# Patient Record
Sex: FEMALE | Race: AMERICAN INDIAN OR ALASKA NATIVE | NOT HISPANIC OR LATINO | Employment: UNEMPLOYED | URBAN - METROPOLITAN AREA
[De-identification: names, ages, dates, MRNs, and addresses within clinical notes are randomized per-mention and may not be internally consistent; named-entity substitution may affect disease eponyms.]

---

## 2017-09-11 ENCOUNTER — COMMUNICATION - HEALTHEAST (OUTPATIENT)
Dept: SCHEDULING | Facility: CLINIC | Age: 27
End: 2017-09-11

## 2018-02-16 ENCOUNTER — APPOINTMENT (OUTPATIENT)
Dept: FAMILY MEDICINE | Facility: CLINIC | Age: 28
End: 2018-02-16

## 2018-02-16 PROCEDURE — 99499 UNLISTED E&M SERVICE: CPT | Performed by: FAMILY MEDICINE

## 2018-03-13 ENCOUNTER — TRANSFERRED RECORDS (OUTPATIENT)
Dept: HEALTH INFORMATION MANAGEMENT | Facility: CLINIC | Age: 28
End: 2018-03-13

## 2018-03-16 ENCOUNTER — TRANSFERRED RECORDS (OUTPATIENT)
Dept: HEALTH INFORMATION MANAGEMENT | Facility: CLINIC | Age: 28
End: 2018-03-16

## 2018-03-16 ENCOUNTER — TELEPHONE (OUTPATIENT)
Dept: FAMILY MEDICINE | Facility: CLINIC | Age: 28
End: 2018-03-16

## 2018-03-16 DIAGNOSIS — O09.31: Primary | ICD-10-CM

## 2018-03-16 LAB
C TRACH DNA SPEC QL PROBE+SIG AMP: NOT DETECTED
N GONORRHOEA DNA SPEC QL PROBE+SIG AMP: NOT DETECTED
SPECIMEN DESCRIP: NORMAL
SPECIMEN DESCRIPTION: NORMAL

## 2018-03-16 NOTE — TELEPHONE ENCOUNTER
Reason for Call:  Other   Referral    Detailed comments: [facility]    Phone Number Patient can be reached at: Other phone number:  [phone] call when referral placed    Best Time: anytime     Can we leave a detailed message on this number? Not Applicable    Call taken on 3/16/2018 at 11:48 AM by Olga Royal

## 2018-04-10 ENCOUNTER — TRANSFERRED RECORDS (OUTPATIENT)
Dept: HEALTH INFORMATION MANAGEMENT | Facility: CLINIC | Age: 28
End: 2018-04-10

## 2018-04-11 ENCOUNTER — TRANSFERRED RECORDS (OUTPATIENT)
Dept: HEALTH INFORMATION MANAGEMENT | Facility: CLINIC | Age: 28
End: 2018-04-11

## 2018-04-13 ENCOUNTER — TRANSFERRED RECORDS (OUTPATIENT)
Dept: HEALTH INFORMATION MANAGEMENT | Facility: CLINIC | Age: 28
End: 2018-04-13

## 2018-04-23 ENCOUNTER — TELEPHONE (OUTPATIENT)
Dept: MATERNAL FETAL MEDICINE | Facility: CLINIC | Age: 28
End: 2018-04-23

## 2018-04-23 NOTE — TELEPHONE ENCOUNTER
Phone call this am to pt re consult request. Talked to pt mother who states pt is not available but would be home this afternoon. Attempted to call pt back now and no answer. This scribe would like to talk to pt re scheduling an appointment at [facility] as her provider would like to transfer her care. Per Dr. Allison, pt should be referred to [facility] at this time for OB care. Will attempt to call pt tomorrow. Nadya Dang RN

## 2018-04-24 ENCOUNTER — TELEPHONE (OUTPATIENT)
Dept: MATERNAL FETAL MEDICINE | Facility: CLINIC | Age: 28
End: 2018-04-24

## 2018-04-24 NOTE — TELEPHONE ENCOUNTER
Phone call to [facility]'s [facility] re pt consult. Talked to Sybil CASTREJON. She will talk to provider who referred pt and relay information that MFM unable to schedule pt for consult and L2 as pt not able to come on days offered due to transportation. Aware PCC has not been able to get a hold of pt since to refer her to [facility] for OB care as Dr. Allison has recommended. No appointments scheduled here at this time. Sybil given number for [facility] to refer her for ob care. Number for PCC line if further questions arise Nadya Dang RN

## 2018-05-14 ENCOUNTER — TELEPHONE (OUTPATIENT)
Dept: MATERNAL FETAL MEDICINE | Facility: CLINIC | Age: 28
End: 2018-05-14

## 2018-05-14 ENCOUNTER — HOSPITAL ENCOUNTER (OUTPATIENT)
Facility: CLINIC | Age: 28
Discharge: HOME | End: 2018-05-14
Attending: OBSTETRICS & GYNECOLOGY | Admitting: OBSTETRICS & GYNECOLOGY
Payer: COMMERCIAL

## 2018-05-14 VITALS — TEMPERATURE: 98 F | DIASTOLIC BLOOD PRESSURE: 60 MMHG | SYSTOLIC BLOOD PRESSURE: 114 MMHG | WEIGHT: 278 LBS

## 2018-05-14 PROBLEM — Z36.89 ENCOUNTER FOR TRIAGE IN PREGNANT PATIENT: Status: ACTIVE | Noted: 2018-05-14

## 2018-05-14 LAB
ALBUMIN UR-MCNC: 10 MG/DL
ALT SERPL W P-5'-P-CCNC: 8 U/L (ref 0–50)
AMORPH CRY #/AREA URNS HPF: ABNORMAL /HPF
AMPHETAMINES UR QL SCN: NEGATIVE
APPEARANCE UR: ABNORMAL
AST SERPL W P-5'-P-CCNC: 10 U/L (ref 0–45)
BILIRUB UR QL STRIP: NEGATIVE
CANNABINOIDS UR QL: NEGATIVE
COCAINE UR QL: NEGATIVE
COLOR UR AUTO: YELLOW
CREAT SERPL-MCNC: 0.54 MG/DL (ref 0.52–1.04)
CREAT UR-MCNC: 137 MG/DL
ERYTHROCYTE [DISTWIDTH] IN BLOOD BY AUTOMATED COUNT: 13.1 % (ref 10–15)
GFR SERPL CREATININE-BSD FRML MDRD: >90 ML/MIN/1.7M2
GLUCOSE UR STRIP-MCNC: NEGATIVE MG/DL
HCT VFR BLD AUTO: 32.2 % (ref 35–47)
HGB BLD-MCNC: 11 G/DL (ref 11.7–15.7)
HGB UR QL STRIP: NEGATIVE
KETONES UR STRIP-MCNC: NEGATIVE MG/DL
LEUKOCYTE ESTERASE UR QL STRIP: NEGATIVE
MCH RBC QN AUTO: 30.2 PG (ref 26.5–33)
MCHC RBC AUTO-ENTMCNC: 34.2 G/DL (ref 31.5–36.5)
MCV RBC AUTO: 89 FL (ref 78–100)
MUCOUS THREADS #/AREA URNS LPF: PRESENT /LPF
NITRATE UR QL: NEGATIVE
OPIATES UR QL SCN: NEGATIVE
PCP UR QL SCN: NEGATIVE
PH UR STRIP: 7 PH (ref 5–7)
PLATELET # BLD AUTO: 406 10E9/L (ref 150–450)
PROT UR-MCNC: 0.11 G/L
PROT/CREAT 24H UR: 0.08 G/G CR (ref 0–0.2)
RBC # BLD AUTO: 3.64 10E12/L (ref 3.8–5.2)
RBC #/AREA URNS AUTO: 1 /HPF (ref 0–2)
SOURCE: ABNORMAL
SP GR UR STRIP: 1.02 (ref 1–1.03)
SQUAMOUS #/AREA URNS AUTO: 3 /HPF (ref 0–1)
UROBILINOGEN UR STRIP-MCNC: NORMAL MG/DL (ref 0–2)
WBC # BLD AUTO: 9.9 10E9/L (ref 4–11)
WBC #/AREA URNS AUTO: 1 /HPF (ref 0–5)

## 2018-05-14 PROCEDURE — G0463 HOSPITAL OUTPT CLINIC VISIT: HCPCS

## 2018-05-14 PROCEDURE — 36415 COLL VENOUS BLD VENIPUNCTURE: CPT | Performed by: OBSTETRICS & GYNECOLOGY

## 2018-05-14 PROCEDURE — 84460 ALANINE AMINO (ALT) (SGPT): CPT | Performed by: OBSTETRICS & GYNECOLOGY

## 2018-05-14 PROCEDURE — 85027 COMPLETE CBC AUTOMATED: CPT | Performed by: OBSTETRICS & GYNECOLOGY

## 2018-05-14 PROCEDURE — 82565 ASSAY OF CREATININE: CPT | Performed by: OBSTETRICS & GYNECOLOGY

## 2018-05-14 PROCEDURE — 84450 TRANSFERASE (AST) (SGOT): CPT | Performed by: OBSTETRICS & GYNECOLOGY

## 2018-05-14 PROCEDURE — 81001 URINALYSIS AUTO W/SCOPE: CPT | Performed by: OBSTETRICS & GYNECOLOGY

## 2018-05-14 PROCEDURE — 80307 DRUG TEST PRSMV CHEM ANLYZR: CPT | Performed by: OBSTETRICS & GYNECOLOGY

## 2018-05-14 PROCEDURE — 84156 ASSAY OF PROTEIN URINE: CPT | Performed by: OBSTETRICS & GYNECOLOGY

## 2018-05-14 RX ORDER — ALBUTEROL SULFATE 90 UG/1
2 AEROSOL, METERED RESPIRATORY (INHALATION) PRN
COMMUNITY
Start: 2016-01-25 | End: 2022-08-30

## 2018-05-14 NOTE — PROGRESS NOTES
OB Triage Note    S: Ruth Peña is a 28 year old  at 23w2d by 11w0d ultrasound who presents due to lower extremity edema. She reports significant swelling in both legs, which has developed over the course of the past week. She called her clinic in [place] [facility] the [facility], where she lives, and reports she was told to come to [facility] as she is too high risk to be seen there. She denies headache, vision changes, chest pain or SOB. Denies contractions, bleeding or LOF and is feeling fetal movement.     Her pregnancy is complicated by:  - Methamphetamine abuse, in treatment early in pregnancy until 3/28/18.   - Very limited prenatal care; one visit at 17 weeks. This was at [facility]. She was instructed to follow up at [facility] in [place] as [facility] cannot accommodate her BMI in the OR for .  - History of two previous cs  - Tobacco abuse  - Obesity    O:    Vitals:    18 1526 18 1530 18 1545 18 1600   BP:  137/72 139/72 114/60   Temp: 98  F (36.7  C)      TempSrc: Oral      Weight: 126.1 kg (278 lb)        Gen: alert, oriented, NAD  Abd: soft, gravid, obese, non-tender  Ext: pitting edema to ankles bilaterally; 1+ DTRs in LEs without clonus    Labs:  Creat 0.54  Hgb 11  Plts 406  AST 10  ALT 8  UP:C 0.08    A/P:  28 year old  at 23w2d presenting for LE edema    1. Elevated BPs:  - Initially found to have mild range BPs on admission, followed by normal BPs. Patient does not yet meet criteria for gestational HTN as mild BPs not >4 hours apart. She denies history of elevated BPs outside of pregnancy, however has few BPs documented in the system and given her medical comordbity of obesity she may also be an undiagnosed chronic hypertensive. HELLP labs and urine protein:creatinine normal as above and no symptoms of preeclampsia.  - Plan outpatient close follow up. Explained GHTN/preeclampsia to patient and thoroughly reviewed warning signs.     2. Methamphetamine abuse, s/p  treatment  - Negative Utox today    3. Restricted to [facility] or [facility]  - Discussed with patient recommendation for follow up in the [facility] or [place] system. She states that she has changed her restriction to [place] and is adamant that she can receive care here. She does not want to go to [facility] and states she cannot get to [facility] because she does not have a ride, though of note she reports currently living in [place].  - Will have financial call her tomorrow to discuss this further. Tentatively, will plan for her to be seen in [facility] clinic on Wed 5/16 for BP check and to establish prenatal care. She refused to take with her the printed numbers to establish care at  or [facility].     4. LE edema  - Offered compression stockings- declined by patient  - Recommended elevation while at rest    Muriel Beltrán MD

## 2018-05-14 NOTE — IP AVS SNAPSHOT
[facility]    [address]    [facility] [address]    Phone:  [phone]                                       After Visit Summary   5/14/2018    Ruth Peña    MRN: [id number]           After Visit Summary Signature Page     I have received my discharge instructions, and my questions have been answered. I have discussed any challenges I see with this plan with the nurse or doctor.    ..........................................................................................................................................  Patient/Patient Representative Signature      ..........................................................................................................................................  Patient Representative Print Name and Relationship to Patient    ..................................................               ................................................  Date                                            Time    ..........................................................................................................................................  Reviewed by Signature/Title    ...................................................              ..............................................  Date                                                            Time

## 2018-05-14 NOTE — PLAN OF CARE
Problem: Patient Care Overview  Goal: Plan of Care/Patient Progress Review  Outcome: Adequate for Discharge Date Met: 05/14/18  Discharge home undelivered. Discharge instructions reviewed per Dr. Cummings and patient verbalized understanding. [facility] clinic will call patient tomorrow and set up an appointment for Wednesday afternoon. Patient in agreement.

## 2018-05-14 NOTE — IP AVS SNAPSHOT
MRN:[id number]                      After Visit Summary   5/14/2018    Ruth Peña    MRN: [id number]           Thank you!     Thank you for choosing [facility] for your care. Our goal is always to provide you with excellent care. Hearing back from our patients is one way we can continue to improve our services. Please take a few minutes to complete the written survey that you may receive in the mail after you visit with us. Thank you!        Patient Information     Date Of Birth          1990        Designated Caregiver       Most Recent Value    Caregiver    Will someone help with your care after discharge? no      About your hospital stay     You were admitted on:  May 14, 2018 You last received care in the:  [facility]    You were discharged on:  May 14, 2018       Who to Call     For medical emergencies, please call 911.  For non-urgent questions about your medical care, please call your primary care provider or clinic, [phone]          Attending Provider     Provider Specialty    Mruiel Beltrán MD OB/Gyn       Primary Care Provider Office Phone # Fax #    [facility] [phone]       When to contact your care team       Call [phone] if you experience:  - headache not relived by pain medications  - spots or flashes of light in your vision  - severe upper abdominal pain  - chest pain  - shortness of breath                  Further instructions from your care team       Discharge Instruction for Undelivered Patients      You were seen for: Headache and swelling  We Consulted: Dr. Beltrán  You had (Test or Medicine):Fetal monitoring, Lab work WNL     Diet:   Drink 8 to 12 glasses of liquids (milk, juice, water) every day.  You may eat meals and snacks.     Activity:  Count fetal kicks everyday (see handout)  Call your doctor or nurse midwife if your baby is moving less than usual.     Call your provider if you notice:  Swelling in your face or  "increased swelling in your hands or legs.  Headaches that are not relieved by Tylenol (acetaminophen).  Changes in your vision (blurring: seeing spots or stars.)  Nausea (sick to your stomach) and vomiting (throwing up).   Weight gain of 5 pounds or more per week.  Heartburn that doesn't go away.  Signs of bladder infection: pain when you urinate (use the toilet), need to go more often and more urgently.  The bag of weiss (rupture of membranes) breaks, or you notice leaking in your underwear.  Bright red blood in your underwear.  Abdominal (lower belly) or stomach pain.  For first baby: Contractions (tightening) less than 5 minutes apart for one hour or more.  Second (plus) baby: Contractions (tightening) less than 10 minutes apart and getting stronger.  *If less than 34 weeks: Contractions (tightenings) more than 6 times in one hour.  Increase or change in vaginal discharge (note the color and amount)    Follow-up:  Make an appointment to be seen on This week          Pending Results     No orders found from 5/12/2018 to 5/15/2018.            Statement of Approval     Ordered          05/14/18 1711  I have reviewed and agree with all the recommendations and orders detailed in this document.  EFFECTIVE NOW     Approved and electronically signed by:  Muriel Beltrán MD             Admission Information     Date & Time Provider Department Dept. Phone    5/14/2018 Muriel Beltrán MD [facility] [phone]      Your Vitals Were     Blood Pressure Temperature Weight             114/60 98  F (36.7  C) (Oral) 126.1 kg (278 lb)         MyChart Information     [facility] lets you send messages to your doctor, view your test results, renew your prescriptions, schedule appointments and more. To sign up, go to [email or web address] . Click on \"Log in\" on the left side of the screen, which will take you to the Welcome page. Then click on \"Sign up Now\" on the right side of the page.     You will be asked to enter " the access code listed below, as well as some personal information. Please follow the directions to create your username and password.     Your access code is: [email or web address]  Expires: 2018  5:13 PM     Your access code will  in 90 days. If you need help or a new code, please call your [facility] clinic or [phone].        Care EveryWhere ID     This is your Care EveryWhere ID. This could be used by other organizations to access your [facility] medical records  [id number]        Equal Access to Services     [facility]: Hadalba vazquez Sonickie, waayesha luqadaha, qaybtrev kaalmaluther gray, jurgen rudd . So [facility] [phone].    ATENCIÓN: Si habla español, tiene a parr disposición servicios gratuitos de asistencia lingüística. Llame al [phone].    We comply with applicable federal civil rights laws and [place] laws. We do not discriminate on the basis of race, color, national origin, age, disability, sex, sexual orientation, or gender identity.               Review of your medicines      UNREVIEWED medicines. Ask your doctor about these medicines        Dose / Directions    albuterol 108 (90 Base) MCG/ACT Inhaler   Commonly known as:  PROAIR HFA/PROVENTIL HFA/VENTOLIN HFA        Dose:  2 puff   Inhale 2 puffs into the lungs as needed   Refills:  0       PRENATAL VITAMIN PO        Dose:  1 tablet   Take 1 tablet by mouth   Refills:  0                Protect others around you: Learn how to safely use, store and throw away your medicines at www.disposemymeds.org.             Medication List: This is a list of all your medications and when to take them. Check marks below indicate your daily home schedule. Keep this list as a reference.      Medications           Morning Afternoon Evening Bedtime As Needed    albuterol 108 (90 Base) MCG/ACT Inhaler   Commonly known as:  PROAIR HFA/PROVENTIL HFA/VENTOLIN HFA   Inhale 2 puffs into the lungs as needed                                 PRENATAL VITAMIN PO   Take 1 tablet by mouth

## 2018-05-14 NOTE — DISCHARGE INSTRUCTIONS
Discharge Instruction for Undelivered Patients      You were seen for: Headache and swelling  We Consulted: Dr. Beltrán  You had (Test or Medicine):Fetal monitoring, Lab work WNL     Diet:   Drink 8 to 12 glasses of liquids (milk, juice, water) every day.  You may eat meals and snacks.     Activity:  Count fetal kicks everyday (see handout)  Call your doctor or nurse midwife if your baby is moving less than usual.     Call your provider if you notice:  Swelling in your face or increased swelling in your hands or legs.  Headaches that are not relieved by Tylenol (acetaminophen).  Changes in your vision (blurring: seeing spots or stars.)  Nausea (sick to your stomach) and vomiting (throwing up).   Weight gain of 5 pounds or more per week.  Heartburn that doesn't go away.  Signs of bladder infection: pain when you urinate (use the toilet), need to go more often and more urgently.  The bag of weiss (rupture of membranes) breaks, or you notice leaking in your underwear.  Bright red blood in your underwear.  Abdominal (lower belly) or stomach pain.  For first baby: Contractions (tightening) less than 5 minutes apart for one hour or more.  Second (plus) baby: Contractions (tightening) less than 10 minutes apart and getting stronger.  *If less than 34 weeks: Contractions (tightenings) more than 6 times in one hour.  Increase or change in vaginal discharge (note the color and amount)    Follow-up:  Make an appointment to be seen on This week

## 2018-05-14 NOTE — PLAN OF CARE
23+4 weeks here with c/o head ache and lower extremity swelling.  Difficulty obtaining fetal heart tones due to pt size.  Dop tones 140, toco applied, no contractions noted.  Pt does have 4+ pitting edema in lower extremities.  Admission data base remarkable for meth use early in pregnancy, pt smokes 1-2 cigarettes per day, does have significant mental health hx that she feels is well controlled at present time but does not receive any treatment for it.  Seriel blood pressures started and have been upper end of normal limits.  Pt c/o mild head ache; ice pack given.  Dr Perales notified of patients arrival, labs ordered. Report to LANDON Sauceda RN will anticipate Dr Perales's assessment and plan

## 2018-05-15 ENCOUNTER — TELEPHONE (OUTPATIENT)
Dept: OBGYN | Facility: CLINIC | Age: 28
End: 2018-05-15

## 2018-05-15 NOTE — TELEPHONE ENCOUNTER
"Call back from [place]-- Pt has MA restricted to her primary clinic ([facility]- [facility], [place]). In order to be covered here this provider will need to send a referral to the \"restricted MA program\" noting she is referred to be seen here for prenatal care for course of pregnancy, delivery, and post partum care.    Attempted to reach patient but reached voicemail for a \"Soni\" on provided phone number. Left message requesting pt call us back.  "
Dr. Beltrán brought this patient to RN attention to check on insurance coverage for office visits. Call out to [facility] in [facility] financial counseling. If covered, pt should be scheduled for offive visit this week (Wednesday afternoon, if possible, on DOD schedule if necessary).  
no

## 2018-06-07 ENCOUNTER — APPOINTMENT (OUTPATIENT)
Dept: OBGYN | Facility: CLINIC | Age: 28
End: 2018-06-07
Payer: COMMERCIAL

## 2018-06-07 ENCOUNTER — PRENATAL OFFICE VISIT (OUTPATIENT)
Dept: OBGYN | Facility: CLINIC | Age: 28
End: 2018-06-07

## 2018-06-07 DIAGNOSIS — Z34.80 PRENATAL CARE, SUBSEQUENT PREGNANCY: Primary | ICD-10-CM

## 2018-06-07 PROCEDURE — 99207 ZZC NO CHARGE NURSE ONLY: CPT | Performed by: ADVANCED PRACTICE MIDWIFE

## 2018-06-07 NOTE — PROGRESS NOTES
Patient is transfer [facility] in [place] She has asked that her records be sent to [facility]  Sue Denis OB Intake Nurse

## 2018-06-07 NOTE — MR AVS SNAPSHOT
"              After Visit Summary   6/7/2018    Ruth Peña    MRN: [id number]           Patient Information     Date Of Birth          1990        Visit Information        Provider Department      6/7/2018 3:28 PM Danya Brandt APRN CNM [facility]        Today's Diagnoses     Prenatal care, subsequent pregnancy    -  1       Follow-ups after your visit        Your next 10 appointments already scheduled     Jun 14, 2018  2:00 PM CDT   New Prenatal with FAITH Ellis CNM, [facility]   [facility]    [address] [place] [address]   [phone]              Who to contact     If you have questions or need follow up information about today's clinic visit or your schedule please contact [facility] directly at [phone].  Normal or non-critical lab and imaging results will be communicated to you by MyChart, letter or phone within 4 business days after the clinic has received the results. If you do not hear from us within 7 days, please contact the clinic through MyChart or phone. If you have a critical or abnormal lab result, we will notify you by phone as soon as possible.  Submit refill requests through [employer] or call your pharmacy and they will forward the refill request to us. Please allow 3 business days for your refill to be completed.          Additional Information About Your Visit        MyChart Information     [employer] lets you send messages to your doctor, view your test results, renew your prescriptions, schedule appointments and more. To sign up, go to [email or web address] . Click on \"Log in\" on the left side of the screen, which will take you to the Welcome page. Then click on \"Sign up Now\" on the right side of the page.     You will be asked to enter the access code listed below, as well as some personal information. Please follow the directions to create your username and password.   "   Your access code is: [email or web address]  Expires: 2018  5:13 PM     Your access code will  in 90 days. If you need help or a new code, please call your [facility] clinic or [phone].        Care EveryWhere ID     This is your Care EveryWhere ID. This could be used by other organizations to access your [facility] medical records  [id number]         Blood Pressure from Last 3 Encounters:   18 114/60    Weight from Last 3 Encounters:   18 126.1 kg (278 lb)              Today, you had the following     No orders found for display       Primary Care Provider Office Phone # Fax #    [facility] [phone]       [address] [place], [place] [address]        Equal Access to Services     [facility] : Akanksha vazquez Sonickie, waaxda luqadaha, qaybta kaalmada adeegyada, jurgen rudd . So [facility] [phone].    ATENCIÓN: Si habla español, tiene a parr disposición servicios gratuitos de asistencia lingüística. Llame al [phone].    We comply with applicable federal civil rights laws and [place] laws. We do not discriminate on the basis of race, color, national origin, age, disability, sex, sexual orientation, or gender identity.            Thank you!     Thank you for choosing [facility]  for your care. Our goal is always to provide you with excellent care. Hearing back from our patients is one way we can continue to improve our services. Please take a few minutes to complete the written survey that you may receive in the mail after your visit with us. Thank you!             Your Updated Medication List - Protect others around you: Learn how to safely use, store and throw away your medicines at www.disposemymeds.org.          This list is accurate as of 18  3:57 PM.  Always use your most recent med list.                   Brand Name Dispense Instructions for use Diagnosis    albuterol 108 (90 Base) MCG/ACT Inhaler    PROAIR  HFA/PROVENTIL HFA/VENTOLIN HFA     Inhale 2 puffs into the lungs as needed        PRENATAL VITAMIN PO      Take 1 tablet by mouth

## 2018-06-13 ENCOUNTER — TELEPHONE (OUTPATIENT)
Dept: OBGYN | Facility: CLINIC | Age: 28
End: 2018-06-13

## 2018-06-13 DIAGNOSIS — Z34.82 PRENATAL CARE, SUBSEQUENT PREGNANCY IN SECOND TRIMESTER: Primary | ICD-10-CM

## 2020-06-22 ENCOUNTER — TELEPHONE (OUTPATIENT)
Dept: SURGERY | Facility: CLINIC | Age: 30
End: 2020-06-22

## 2020-06-22 NOTE — TELEPHONE ENCOUNTER
Patient interested in weight loss surgery    Ruth Peña        Scheduled to see Laura Cobb PA-C at 1330, followed by an appt with a dietitian at 1430.    Spoke to Patient:    Left message: regarding appt Tues June 23rd.    Can find information on bariatrix products at: [email or web address]    Instructed to view seminar and complete pre-visit questionnaire prior to visit at [email or web address].    [phone] contact center phone number      Skylar Cheng EMT

## 2020-06-23 ENCOUNTER — VIRTUAL VISIT (OUTPATIENT)
Dept: SURGERY | Facility: CLINIC | Age: 30
End: 2020-06-23
Payer: COMMERCIAL

## 2020-06-23 ENCOUNTER — E-VISIT (OUTPATIENT)
Dept: MIDWIFE SERVICES | Facility: CLINIC | Age: 30
End: 2020-06-23

## 2020-06-23 ENCOUNTER — E-VISIT (OUTPATIENT)
Dept: MIDWIFE SERVICES | Facility: CLINIC | Age: 30
End: 2020-06-23
Payer: COMMERCIAL

## 2020-06-23 VITALS — HEIGHT: 63 IN | WEIGHT: 293 LBS | BODY MASS INDEX: 51.91 KG/M2

## 2020-06-23 DIAGNOSIS — M54.9 BACK PAIN, UNSPECIFIED BACK LOCATION, UNSPECIFIED BACK PAIN LATERALITY, UNSPECIFIED CHRONICITY: Primary | ICD-10-CM

## 2020-06-23 DIAGNOSIS — E66.01 MORBID OBESITY (H): Primary | ICD-10-CM

## 2020-06-23 PROCEDURE — 99207 ZZC NO BILLABLE SERVICE THIS VISIT: CPT | Performed by: ADVANCED PRACTICE MIDWIFE

## 2020-06-23 RX ORDER — BUPROPION HYDROCHLORIDE 150 MG/1
TABLET ORAL
COMMUNITY
Start: 2020-02-17 | End: 2022-08-30

## 2020-06-23 RX ORDER — TRAZODONE HYDROCHLORIDE 50 MG/1
TABLET, FILM COATED ORAL
COMMUNITY
Start: 2020-02-13 | End: 2022-08-30

## 2020-06-23 RX ORDER — NALTREXONE HYDROCHLORIDE 50 MG/1
TABLET, FILM COATED ORAL
Qty: 30 TABLET | Refills: 3 | Status: SHIPPED | OUTPATIENT
Start: 2020-06-23 | End: 2022-08-30

## 2020-06-23 RX ORDER — ESCITALOPRAM OXALATE 10 MG/1
TABLET ORAL
COMMUNITY
Start: 2019-07-31 | End: 2022-08-30

## 2020-06-23 RX ORDER — NORGESTIMATE AND ETHINYL ESTRADIOL 0.25-0.035
KIT ORAL
COMMUNITY
Start: 2019-10-16 | End: 2022-07-12

## 2020-06-23 ASSESSMENT — PAIN SCALES - GENERAL: PAINLEVEL: NO PAIN (0)

## 2020-06-23 ASSESSMENT — MIFFLIN-ST. JEOR: SCORE: 2059.92

## 2020-06-23 NOTE — Clinical Note
Armaan and Lary,  NBS I saw today.  Plan for surgery Dec 2020 Dr Mejía.  Armaan, can you please check insurance?  Can you also schedule her to see Dr Mejía in 1 month and RD in 1 month?  She also needs to see Bernadette WINTERS in 1 month and me in 3 months to follow up meds.  I sent all her info via [employer] today since Muriel and Ellen gone.  Thanks! Laura

## 2020-06-23 NOTE — PROGRESS NOTES
"New Bariatric Surgery Consultation Note    2020    RE: Ruth Peña  MR#: [id number]  : 1990      Referring provider:       2020   Who referred you? [facility] and [facility]       Chief Complaint/Reason for visit: evaluation for possible weight loss surgery    Dear Clinic, Anna Ross (General),    I had the pleasure of seeing your patient, Ruth Peña, to evaluate her obesity and consider her for possible weight loss surgery. As you know, Ruth Peña is 30 year old.  She has a height of 5' 3\", a weight of 300 lbs 0 oz, and calculated Body mass index is 53.14 kg/m .    Referred by PCP at [facility]    Watched the online seminar  Gained 150 lbs over the last 2 years since 3rd pregnancy    HISTORY OF PRESENT ILLNESS:  Weight Loss History Reviewed with Patient 2020   How long have you been overweight? Since early childhood   What is the most that you have ever weighed? 300   What is the most weight you have lost? 100   I have tried the following methods to lose weight Watching portions or calories, Exercise   I have tried the following weight loss medications? (Check all that apply) None   Have you ever had weight loss surgery? No       CO-MORBIDITIES OF OBESITY INCLUDE:     2020   I have the following health issues associated with obesity: GERD (Reflux), Asthma, Osteoarthritis (joint disease)   GERD occasionally.  No meds    PAST MEDICAL HISTORY:  Past Medical History:   Diagnosis Date     Anxiety      Chickenpox      Depression      PTSD (post-traumatic stress disorder)      Uncomplicated asthma     Well controlled       PAST SURGICAL HISTORY:  Past Surgical History:   Procedure Laterality Date      SECTION  2010      SECTION  10/03/2012     LAPAROSCOPIC CHOLECYSTECTOMY  2014     TONSILLECTOMY & ADENOIDECTOMY Bilateral        FAMILY HISTORY:   Family History   Problem Relation Age of Onset     Hypertension Mother      Hypertension Father      " Alcoholism Maternal Grandfather      Cirrhosis Maternal Grandfather      Substance Abuse Paternal Grandfather         drugs       SOCIAL HISTORY:   Social History Questions Reviewed With Patient 6/23/2020   Which best describes your employment status (select all that apply) 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 Yes   Who do you have in your support network that can be available to help you for the first 2 weeks after surgery?  and mom   Who can you count on for support throughout your weight loss surgery journey?  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 Never   Do you currently use any of the following Nicotine products? cigarettes   Have you or are you currently using street drugs or prescription strength medication for which you do not have a prescription for? No   Do you have a history of chemical dependency (alcohol or drug abuse)? No   Currently smokes cigarettes 1-2 per day    PSYCHOLOGICAL HISTORY:   Psychological History Reviewed With Patient 6/23/2020   Have you ever attempted suicide? Never.   Have you had thoughts of suicide in the past year? No   Have you ever been hospitalized for mental illness or a suicide attempt? Never.   Do you have a history of chronic pain? No   Have you ever been diagnosed with fibromyalgia? No   Are you currently being treated for any of the following? (select all that apply) Depression, Anxiety   Are you currently seeing a therapist or counselor?  No   Are you currently seeing a psychiatrist? No   Depression well controlled on medication    ROS:     6/23/2020   Skin:  Leg swelling   HEENT: Headaches   Musculoskeletal: Joint Pain, Back pain, Swelling of legs   Cardiovascular: None of the above   Pulmonary: Snoring   Gastrointestinal: Heartburn, Reflux   Genitourinary: None of the above  "  Hematological: None of the above   Neurological: None of the above   Female only: Loss of menstrual cycles, Irregular menstrual cycles       EATING BEHAVIORS:     6/23/2020   Have you or anyone else thought that you had an eating disorder? No   Do you currently binge eat (eat a large amount of food in a short time)? No   Are you an emotional eater? No   Do you get up to eat after falling asleep? No       EXERCISE:     6/23/2020   How often do you exercise? 3 to 4 times per week   What is the duration of your exercise (in minutes)? 30 Minutes   What types of exercise do you do? walking, other   What keeps you from being more active?  Pain       MEDICATIONS:  Current Outpatient Medications   Medication Sig Dispense Refill     buPROPion (WELLBUTRIN XL) 150 MG 24 hr tablet TK 1 T PO D       escitalopram (LEXAPRO) 10 MG tablet        ESTARYLLA 0.25-35 MG-MCG tablet TK 1 T PO D       traZODone (DESYREL) 50 MG tablet TK 1 TO 2 TS PO HS PRF SLEEP       albuterol (PROAIR HFA/PROVENTIL HFA/VENTOLIN HFA) 108 (90 Base) MCG/ACT Inhaler Inhale 2 puffs into the lungs as needed       Prenatal Vit-Fe Fumarate-FA (PRENATAL VITAMIN PO) Take 1 tablet by mouth         ALLERGIES:  Allergies   Allergen Reactions     Codeine Anaphylaxis     Tramadol Other (See Comments)       LABS/IMAGING/MEDICAL RECORDS REVIEW:     PHYSICAL EXAM:  Ht 1.6 m (5' 3\")   Wt 136.1 kg (300 lb)   BMI 53.14 kg/m    GENERAL: Healthy, alert and no distress  EYES: Eyes grossly normal to inspection.  No discharge or erythema, or obvious scleral/conjunctival abnormalities.  RESP: No audible wheeze, cough, or visible cyanosis.  No visible retractions or increased work of breathing.    SKIN: Visible skin clear. No significant rash, abnormal pigmentation or lesions.  NEURO: Cranial nerves grossly intact.  Mentation and speech appropriate for age.  PSYCH: Mentation appears normal, affect normal/bright, judgement and insight intact, normal speech and appearance " well-groomed.      In summary, Ruth Peña has Class III obesity with a body mass index of Body mass index is 53.14 kg/m . kg/m2 and the comorbidities stated above. She completed an informational seminar and is a candidate for the laparoscopic gastric sleeve.  She will have to complete the following pre-requisites:  If you have not already watched our online seminar please go to [email or web address]    See dietitian in 1 month and monthly for 6 months    See Lauren Bloch MTM pharmacist in 1 month to follow up on weight loss medications    See Laura in 3 months to follow up on pre-op weight loss and weight loss medications    Lose 20 lbs prior to surgery from 300 lbs    See Dr Mejía in 1 month for bariatric surgeon visit    Bariatric labs ordered today.  Will fax to [facility] lab.    Schedule bariatric psych eval with Leslie Carlson at  today OR Call Armaan [phone] to schedule with Lindy Zeng OR call from list given to you today in clinic    Start naltrexone 50mg daily, start with 1/2 tab daily and increase to full tab. Already taking bupropion    Consider adding topiramate at next visit    Need to quit smoking by 3 months before surgery.  Cotinine test    Bariatric Task List  Status:  Is patient a candidate for bariatric surgery?:  patient is a candidate for bariatric surgery -     Cleared to schedule surgeon consult?:    -     Status:  surgery evaluation in process -     Surgeon: Alfonzo -     Tentative surgery month/year: Dec 2020 -        Insurance: Insurance:  [facility] -        Patient Info: Initial Weight:  300 -     Date of Initial Weight/Height:  6/23/2020 -     Goal Weight (lbs):  280 -     Required Weight Loss:  20 -     Surgery Type:  sleeve gastrectomy -        Dietician Visits: Structured weight loss required by insurance?:  structured weight loss required -     Dietician Visit 1:  Completed -     Dietician Visit 2:  Needed -     Dietician Visit 3:  Needed -     Dietician Visit 4:  Needed -    "  Dietician Visit 5:  Needed -     Dietician Visit 6:  Needed -        Psychological Evaluation: Psych eval:  Needed -        Lab Work: Complete Blood Count:  Needed -     Comprehensive Metabolic Panel:  Needed -     Vitamin D:  Needed -     Hgb A1c:  Needed -     PTH:  Needed -     Nicotine Testing:  Needed -        Testing:    PCP: Establish care with PCP:  Completed -     PCP letter of support:  Needed -        Smoking: Quit tobacco use (3 months smoke free)?:  Needed -     Quit date:    -        Patient Education:  Information Session:  Completed -     Given \"Making your decision\" handout?:  Given \"\"Making your decision\"\" handout? -     Given support group information?:  support group contact information provided -     Support plan in place?:  Completed -     Research consents signed?:  research consent not signed -        Final Tasks:  Before surgery online class:  Needed -     Before surgery online class website link:  [email or web address]   After surgery online class:  Needed -     After surgery online class website link:  [email or web address]   Nurse visit for weigh-in and information:  Needed -     Pre-assessment clinic visit with anesthesia team for H&P:  Needed -     Final labs (Hgb, plt, T&S, UA):  Needed -        Notes:   -             Call Armaan Rondon at [phone] to discuss insurance coverage for bariatric surgery.  Please check with your insurance regarding bariatric surgery coverage also.    Is patient currently taking narcotic/opioid medications? no          Today in the office we discussed gastric sleeve surgery. Preoperative, perioperative, and postoperative processes, management, and follow up were addressed.  Risks and benefits were outlined including the risk of death, staple line leak (1-2%), PE, DVT, ulcer, worsening GERD, N/V, stricture, hernia, wound infection, weight regain, and vitamin deficiencies. I emphasized exercise and activity along with " "appropriate food choice as the main foundation for weight loss with surgery providing surgical reinforcement of this.  All questions were answered.  A goal sheet and support group handout were given to the patient.      Once the patient has completed the requirements in their task list and there are no further recommendations, the pt will be allowed to see the surgeon of her choice for consultation on the laparoscopic gastric sleeve surgery. Patient verbalizes understanding of the process to surgery and expectations for the postoperative period including the need for lifelong lifestyle changes, vitamin supplementation, and laboratory monitoring.      Sincerely,     Laura Cobb PA-C        The patient has been notified of following:     \"This video visit will be conducted via a call between you and your physician/provider. We have found that certain health care needs can be provided without the need for an in-person physical exam.  This service lets us provide the care you need with a video conversation.  If a prescription is necessary we can send it directly to your pharmacy.  If lab work is needed we can place an order for that and you can then stop by our lab to have the test done at a later time.    Video visits are billed at different rates depending on your insurance coverage.  Please reach out to your insurance provider with any questions.    If during the course of the call the physician/provider feels a video visit is not appropriate, you will not be charged for this service.\"    Patient has given verbal consent for Video visit? Yes    Will anyone else be joining your video visit? No      Video-Visit Details    Type of service:  Video Visit    Video Start Time: 137  Video End Time: 151    Originating Location (pt. Location): Home    Distant Location (provider location):   [facility]     Platform used for Video Visit: [employer]    Laura Cobb PA-C      "

## 2020-06-23 NOTE — LETTER
"6/23/2020       RE: Ruth Peña  [address] [place] [place] [address]     Dear Colleague,    Thank you for referring your patient, Ruth Peña, to the [facility]NAGEMENT at [facility]. Please see a copy of my visit note below.    Ruth Peña is a 30 year old female who is being evaluated via a billable video visit.        Video-Visit Details    Type of service:  Video Visit    Video Start Time: 2:39 PM  Video End Time: 3:04 PM    Originating Location (pt. Location): Home    Distant Location (provider location):  [facility]GICAL WE[facility] MANAG[facility]     Platform used for Video Visit: [employer]      New Bariatric Nutrition Consultation Note    Reason For Visit: Nutrition Assessment    Ruth Peña is a 30 year old presenting today for new bariatric nutrition consult.   Pt is interested in laparoscopic sleeve gastrectomy with Dr. Mejía expected surgery in Dec 2020.  Patient is accompanied by self.  This is pt's first of 6 required nutrition visits prior to surgery.     Pt referred by MYRANDA Zamarripa on June 23, 2020.  Patient with Co-morbidities of obesity including:  Type II DM no  Renal Failure no  Sleep apnea no  Hypertension no   Dyslipidemia no  Joint pain yes osteoarthritis  Back pain no  GERD yes     Support System Reviewed With Patient 6/23/2020   Who do you have in your support network that can be available to help you for the first 2 weeks after surgery?  and mom   Who can you count on for support throughout your weight loss surgery journey?  and mom       ANTHROPOMETRICS:  Estimated body mass index is 53.14 kg/m  as calculated from the following:    Height as of an earlier encounter on 6/23/20: 1.6 m (5' 3\").    Weight as of an earlier encounter on 6/23/20: 136.1 kg (300 lb).    Required weight loss goal pre-op: 20 lbs from initial consult weight (goal weight 280 lbs or less before surgery)       6/23/2020   I have tried the following methods to " lose weight Watching portions or calories, Exercise       Weight Loss Questions Reviewed With Patient 6/23/2020   How long have you been overweight? Since early childhood       SUPPLEMENT INFORMATION:  None     NUTRITION HISTORY:  - Eats 1 meal per day around 5/6 pm. She explains that she is not hungry early in the day and is busy with the kids. 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   - She does crave sweets in the evening and with often have cookies or cake after dinner.     Recall Diet Questions Reviewed With Patient 6/23/2020   Describe what you typically consume for breakfast (typical or most recent): none   Describe what you typically consume for lunch (typical or most recent): none   Describe what you typically consume for supper (typical or most recent): pasta, fried foods   Describe what you typically consume as snacks (typical or most recent): cookies, cakes   How many ounces of water, or other low calorie drinks, do you drink daily (8 oz=1 glass)? 48 oz   How many ounces of caffeine (coffee, tea, pop) do you drink daily (8 oz=1 glass)? 8 oz- tea, sugar. Coffee sometimes    How many ounces of juice, pop, sweet tea, sports drinks, protein drinks, other sweetened drinks, do you drink daily (8 oz=1 glass)? 8 oz- none    How often do you drink alcohol? Never       Eating Habits 6/23/2020   Do you have any dietary restrictions? No   Do you currently binge eat (eat a large amount of food in a short time)? No   Are you an emotional eater? No   Do you get up to eat after falling asleep? No   What foods do you crave? sweets       ADDITIONAL INFORMATION:  Tobacco: smokes 1-2 cigarettes per day    Dining Out History Reviewed With Patient 6/23/2020   How often do you dine out? Rarely.   Where do you dine out? (select all that apply) sit-down restaurants, fast food chains, take out   What types of food do you order when you dine out? fatoumata santiago       Physical Activity Reviewed With Patient  "6/23/2020   How often do you exercise? 3 to 4 times per week   What is the duration of your exercise (in minutes)? 30 Minutes   What types of exercise do you do? walking, other   What keeps you from being more active?  Pain       MALNUTRITION  Video visit: visual NFPE from the waist up   % Intake: No decreased intake noted  % Weight Loss: None noted  Subcutaneous Fat Loss: None observed-Visual assessment   Muscle Loss: None observed-Visual assessment  Fluid Accumulation/Edema: Unable to assess  Malnutrition Diagnosis: Patient does not meet two of the established criteria necessary for diagnosing malnutrition    NUTRITION DIAGNOSIS:  Obesity r/t long history of self-monitoring deficit and excessive energy intake aeb BMI >30 kg/m2.    INTERVENTION:  Intervention Provided/Education:   1. Provided on post-op diet guidelines, vitamins/minerals essential post-operatively, GI anatomy of bariatric surgeries, ways to help prepare for post-op diet guidelines pre-operatively, portion/calorie-control, mindful eating and sources of protein.  2. Answered all questions at this time  3. AVS via [employer]    Questions Reviewed With Patient 6/23/2020   How ready are you to make changes regarding your weight? Number 1 = Not ready at all to make changes up to 10 = very ready. 10   How confident are you that you can change? 1 = Not confident that you will be successful making changes up to 10 = very confident. 10       Patient Understanding: good  Expected Compliance: fair-good    GOALS:  Relating To Eating:  Eat slowly (20-30 minutes per meal), chewing foods well (25 chews per bite/applesauce consistency)  9\" Plate method (1/2 plate non-starchy vegetables/fruit, 1/4 plate lean protein, 1/4 plate whole grain starch - no more than 1/2 cup carb/meal)  Eat 3 meals per day - minimum of protein after surgery is 60 grams per day  - meal ideas: 1/2 sandwich with fruit or vegetable, chicken strips with a fruit or vegetable, cottage cheese or " yogurt with 1/2 cup of fruit. Egg/tuna/chicken salad with vegetables.   - Can use 1 protein shake as a meal replacement: *Protein Shake Criteria: no more than 210 Calories, at least 20 grams of protein, and less than 10 grams of sugar     Relating to beverages:  Reduce caffeine/carbonation/calorie containing beverages  Separate fluids from meals by 30 minutes before, during, and after eating    Relating to dietary supplements:  Start a multivitamin containing iron daily    Relating to activity:  Increase activity as able    Time spent with patient: 25 minutes.  Chen Enrique, MS, RD, LD

## 2020-06-23 NOTE — PATIENT INSTRUCTIONS
"Thank you for allowing us the privilege of caring for you. We hope we provided you with the excellent service you deserve.   Please let us know if there is anything else we can do for you so that we can be sure you are completely satisfied with your care experience.    To ensure the quality of our services you may be receiving a patient satisfaction survey from an independent patient satisfaction monitoring company.    The greatest compliment you can give is a \"Likely to Recommend\"    Your visit was with Laura Cobb PA-C today.    Instructions per today's visit:     If you have not already watched our online seminar please go to [email or web address]    See dietitian in 1 month and monthly for 6 months    See Lauren Bloch MTM pharmacist in 1 month to follow up on weight loss medications    See Laura in 3 months to follow up on pre-op weight loss and weight loss medications    Lose 20 lbs prior to surgery from 300 lbs    See Dr Mejía in 1 month for bariatric surgeon visit    Bariatric labs ordered today.  Will fax to [facility] lab.    Schedule bariatric psych eval with Leslie Carlson at  today OR Call Armaan [phone] to schedule with Lindy Zeng OR call from list given to you today in clinic    Start naltrexone 50mg daily, start with 1/2 tab daily and increase to full tab. Already taking bupropion    Consider adding topiramate at next visit if needed.  She is taking birth control pill.    Need to quit smoking by 3 months before surgery.  Cotinine test to be done after 4 weeks of no nicotine use.    Bariatric Task List  Status:  Is patient a candidate for bariatric surgery?:  patient is a candidate for bariatric surgery -     Cleared to schedule surgeon consult?:    -     Status:  surgery evaluation in process -     Surgeon: Alfonzo -     Tentative surgery month/year: Dec 2020 -        Insurance: Insurance:  BCBS -        Patient Info: Initial Weight:  300 -     Date of Initial Weight/Height:  " "6/23/2020 -     Goal Weight (lbs):  280 -     Required Weight Loss:  20 -     Surgery Type:  sleeve gastrectomy -        Dietician Visits: Structured weight loss required by insurance?:  structured weight loss required -     Dietician Visit 1:  Completed -     Dietician Visit 2:  Needed -     Dietician Visit 3:  Needed -     Dietician Visit 4:  Needed -     Dietician Visit 5:  Needed -     Dietician Visit 6:  Needed -        Psychological Evaluation: Psych eval:  Needed -        Lab Work: Complete Blood Count:  Needed -     Comprehensive Metabolic Panel:  Needed -     Vitamin D:  Needed -     Hgb A1c:  Needed -     PTH:  Needed -     Nicotine Testing:  Needed -        Testing:    PCP: Establish care with PCP:  Completed -     PCP letter of support:  Needed -        Smoking: Quit tobacco use (3 months smoke free)?:  Needed -     Quit date:    -        Patient Education:  Information Session:  Completed -     Given \"Making your decision\" handout?:  Given \"\"Making your decision\"\" handout? -     Given support group information?:  support group contact information provided -     Support plan in place?:  Completed -     Research consents signed?:  research consent not signed -        Final Tasks:  Before surgery online class:  Needed -     Before surgery online class website link:  [email or web address]   After surgery online class:  Needed -     After surgery online class website link:  [email or web address]   Nurse visit for weigh-in and information:  Needed -     Pre-assessment clinic visit with anesthesia team for H&P:  Needed -     Final labs (Hgb, plt, T&S, UA):  Needed -        Notes:   -       Please call our contact center at [phone] to schedule your next appointments.    Meal Replacement Products:    Here is the link to our new e-store where you can purchase our meal replacement products     [email or web address]    The one week starter kit is a " great way to sample a variety of products and see what works for you.    If you want more information about the product go to: [employer]    Free Shipping for orders over $75     Benefits of meal replacements products:    Portion and calorie control  Improved nutrition  Structured eating  Simplified food choices  Avoid contact with trigger foods    Interested in working with a health ?  Health coaches work with you to improve your overall health and wellbeing.  They look at the whole person, and may involve discussion of different areas of life, including, but not limited to the four pillars of health (sleep, exercise, nutrition, and stress management). Discuss with your care team if you would like to start working a health .  Health Coaching-3 Pack: Schedule by calling [phone]    $99 for three health coaching visits    Visits may be done in person or via phone    Coaching is a partnership between the  and the client; Coaches do not prescribe or diagnose    Coaching helps inspire the client to reach his/her personal goals     Healthy Lifestyle Support Group   [facility]  Virtual Support Group Now Available    60 minutes of small group guided discussion, support and resources    Led by Health , Desirae Garrido    For questions, and to receive the invite information, contact Desirae at [email or web address]    All our welcome!    One Friday per month, 12:30pm to 1:30pm  JOURNALING FOR HEALTH & HAPPINESS: June 26th  SELF COMPASSION: July 31st  CREATING MY WELLNESS VISION: August 28th  MINDFULNESS PRACTICES FOR A CALM BODY & MIND: September 25th  MINDFUL EATING TOOLS: October 30th  HEALTHY& HAPPY HOLIDAYS: November 20th  OPEN FORUM: December 18th    Connections: Bariatric Care support group  Virtual support group 06/09/2020 from 6:30 PM to 8:00 PM using Microsoft Teams. You will need to get an invitation to the group from Matt Pradhan, Ph.D.,  JARROD at [email or web address] and to learn about using Microsoft Teams. The speaker for this group is Bebe Faust RD, LD-Nutritional Supplementation and Deficiencies.  Connections: Bariatric Care is a monthly support group for people who are interested in having bariatric surgery and those who have already had surgery and are at any point along their journey.    Bluetooth Scale:    We hope to provide you with high quality telephone and virtual healthcare visits while social distancing for COVID-19 is necessary, as well as in the future when virtual visits may be more convenient for you.     Our technology team made it possible for Bluetooth scales to send weight measurements to our electronic medical record. This allows weights from you weighing at home to securely flow into the medical record, which will improve telephone and virtual visits.   Additionally, studies have shown that adults actually lose more weight when their weights are automatically sent to someone else, and also that this process is not stressful for those adults.    Below is a link for purchasing the scale, with a discount code for our patients. You may call your insurance company to see if they will reimburse you for the cost of the scale, as a piece of durable medical equipment. The scales only go up to a weight of 400 pounds. This is an issue and we are working with the developer on increasing this. We found no scales that go over 400lb that have blue-tooth for connecting to [employer].    Scale to purchase: the [employer]  Body  Scale: [email or web address]=aw.ds    Discount Code: We have a discount code for our patients to bring the cost down to $50, the code is:  withmed     Steps to link the scale to [employer] via an Android Phone (you can always disconnect at any point in the future):  1. The order must be placed first before the patient can access Track My  Health within [employer].  2. Download Google Fit cristobal from the Google Play Store   a. Log in or register using your Google account   3. Download the MyChart cristobal from Google Play Store  a. Select add organization   b. Search for Evver and select it   c. Log into [employer]  d. Select Track My Health   e. Select the green connect my account button   f. When prompted log into your Google account   g. Select okay to confirm the account   4. Download the [facility] Health Mate cristobal from Google Play Store  a. [place] for [employer]   b. Go to profile   c. Tap google fit under the Apps section  d. Select the option to activate Google Fit integration   e. Select the same Google account   f. Select okay to confirm the account  g.   Steps to link the scale to [employer] via an iPhone (you can always disconnect at any point in the future):  **Note TerraPass is not available for download on an iPad**  1. The order must be placed first before the patient can access Track [facility] Health within [employer].  2. Locate the Health cristobal on your iPhone.  a. Set up your Apple Health account as prompted  b. The Sources page will show Apps that communicate with your Health cristobal. Once all steps are completed, you should see Health Mate and MyChart listed under the Apps section and your iPhone under the devices section.  i. Select Health Mate  1. Under 'ALLOW  HEALTH MATE  TO WRITE DATA ensure the toggle is on for Weight.  2. This will allow the scale to add your weight to the Apple Health  ii. Select MyChart  1. Under 'ALLOW  [employer]  TO READ DATA ensure the toggle is on for Weight.  2. This allows [employer] to grab the weight from TerraPass so your provider can see your weights.  3. Download the MyChart cristobal from the Cristobal Store   a. Select add organization                                                  b. Search for Power and select it  c. Log into [employer]  d. Select Track My Health   e. Select the green connect my account button   f. Follow prompts  to link your device to [employer].  4. Download the Withings health mate cristobal in the Cristobal Store   a. [place] for [employer]   b. Go to profile   c. [employer] under the Apps section  d. If prompted to allow access with the Health Cristobal, toggle weight on for read and write access.     For any questions/concerns contact Ellen Hoover LPN at [phone]     To schedule appointments with our team, please call [phone]     Please call during clinic hours Monday through Friday 8:00a - 4:00p if you have questions or you can contact us via [employer] at anytime. ?    Lab results will be communicated through My Chart or letter (if My Chart not used). Please call the clinic if you have not received communication after 1 week or if you have any questions.?    Fax: [phone]    Thank you,  Medical Weight Management Team      MEDICATION STARTED AT THIS APPOINTMENT  We are starting Naltrexone. Start with 1/2 tab 1-2 hours prior to the time you have the most trouble with cravings or extra hunger. If you are doing well you may switch to a whole tablet taken at the same time period.     WARNING: This medication blocks the action of opioid type pain medications. If you routinely take any medication like Codeine, Oxycontin,Percocet,Morphine,Dilaudid or Methodone, do not take this until you have talked with weight management staff. If you are planning surgery you should stop Naltrexone 4 days prior to the surgery. If you have an injury that requires pain medication, make sure the health care staff knows you take Naltrexone.     For any questions/concerns contact Ellen Hoover LPN at [phone]     (Do not stop taking it if you don't think it's working. For some people it works without them knowing it.)    Naltrexone is a medication that is used most often to help people who are troubled by dependence on prescription pain killers or alcohol. It has also been found to help with weight loss. Although it's not currently FDA approved for  "weight loss, it has been used safely for a number of years to help people who are carrying extra weight.     Just how Naltrexone helps with weight loss has not been exactly determined.  It seems to work by quieting down brain signals related to strong food cravings. Many of our patients use the word \"addiction\" to describe their feelings and constant thoughts about food. It makes sense then to treat the feeling of dependence on food, outside of real hunger, with a medication designed to help with other sorts of dependence.     Our patients on Naltrexone find that they:    >feel less interest in food   >think less about food and eating and have more time to think of other things   >find it easier to push the plate away   >have an easier time eating less    For some of our patients, these feelings are very immediate. Other patients, don't feel much of a change but find they've lost weight. Like all weight loss medications, Naltrexone works best when you help it work. This means:  1. Having less tempting high calorie (fattening) food around the house or office. (For people with strong cravings this is very important.)   2. Staying away from situations or people that may trigger your cravings .   3. Eating out only one time or less each week.  4. Eating your meals at a table with the TV or computer off.    Side-effects. Naltrexone is generally well tolerated. The main side-effect we see is  nausea or a woozy feeling. A small number of people feel quite ill. Most people have a mild reaction and some people have no reaction at all.  The good news is that this feeling does go away.     In order to avoid nausea, please start the medication with half a pill for the first few days. Go on to a full pill if you are feeling well.      If you  are nauseated on 1/2 a pill it is okay to cut back to 1/4 pill ( a very small amount). Take this for a couple of days and work your way back up to a 1/2 pill and then a whole pill. Taking " the medication at night or with food  to start also may help prevent the feeling of nausea.         Please refer to the pharmacy insert for more information on side-effects. Since many pharmacists are not familiar with the use of naltrexone in weight loss, calling the nurse at [phone] will get you the most accurate information.  In order to get refills of this or any medication we prescribe you must be seen in the medical weight mgmt clinic every 2-3 months. Please have your pharmacy fax a refill request to [phone].

## 2020-06-23 NOTE — NURSING NOTE
"Chief Complaint   Patient presents with     Consult     New appointment.       Vitals:    06/23/20 1300   Weight: 136.1 kg (300 lb)   Height: 1.6 m (5' 3\")       Body mass index is 53.14 kg/m .                            ABIGAIL TINEO, EMT    "

## 2020-06-23 NOTE — PATIENT INSTRUCTIONS
"GOALS:  Relating To Eating:  Eat slowly (20-30 minutes per meal), chewing foods well (25 chews per bite/applesauce consistency)  9\" Plate method (1/2 plate non-starchy vegetables/fruit, 1/4 plate lean protein, 1/4 plate whole grain starch - no more than 1/2 cup carb/meal)  Eat 3 meals per day - minimum of protein after surgery is 60 grams per day  - meal ideas: 1/2 sandwich with fruit or vegetable, chicken strips with a fruit or vegetable, cottage cheese or yogurt with 1/2 cup of fruit. Egg/tuna/chicken salad with vegetables.   - Can use 1 protein shake as a meal replacement: *Protein Shake Criteria: no more than 210 Calories, at least 20 grams of protein, and less than 10 grams of sugar     Relating to beverages:  Reduce caffeine/carbonation/calorie containing beverages  Separate fluids from meals by 30 minutes before, during, and after eating    Relating to dietary supplements:  Start a multivitamin containing iron daily    Relating to activity:  Increase activity as able    Follow up with RD in one month    Chen Enrique, MS, RD, LD  If you need to schedule or reschedule with a dietitian please call [phone].  "

## 2020-06-23 NOTE — PATIENT INSTRUCTIONS
Thank you for choosing us for your care. I think an a clinic visit either virtual or in person with your weight loss surgery team would be the next best step or with family practice.

## 2020-06-23 NOTE — LETTER
Date:July 15, 2020      Patient was self referred, no letter generated. Do not send.        [facility] Health Information

## 2020-06-23 NOTE — LETTER
"2020       RE: Ruth Peña  [address]  [facility] [address]     Dear Colleague,    Thank you for referring your patient, Ruth Peña, to the [facility] SURGICAL WEIGHT MANAGEMENT at [facility]. Please see a copy of my visit note below.    New Bariatric Surgery Consultation Note    2020    RE: Ruth Peña  MR#: [id number]  : 1990      Referring provider:       2020   Who referred you? [facility]       Chief Complaint/Reason for visit: evaluation for possible weight loss surgery    Dear Clinic, Anna Ross (General),    I had the pleasure of seeing your patient, Ruth Peña, to evaluate her obesity and consider her for possible weight loss surgery. As you know, Ruth Peña is 30 year old.  She has a height of 5' 3\", a weight of 300 lbs 0 oz, and calculated Body mass index is 53.14 kg/m .    Referred by PCP at [facility]    Watched the online seminar  Gained 150 lbs over the last 2 years since 3rd pregnancy    HISTORY OF PRESENT ILLNESS:  Weight Loss History Reviewed with Patient 2020   How long have you been overweight? Since early childhood   What is the most that you have ever weighed? 300   What is the most weight you have lost? 100   I have tried the following methods to lose weight Watching portions or calories, Exercise   I have tried the following weight loss medications? (Check all that apply) None   Have you ever had weight loss surgery? No       CO-MORBIDITIES OF OBESITY INCLUDE:     2020   I have the following health issues associated with obesity: GERD (Reflux), Asthma, Osteoarthritis (joint disease)   GERD occasionally.  No meds    PAST MEDICAL HISTORY:  Past Medical History:   Diagnosis Date     Anxiety      Chickenpox      Depression      PTSD (post-traumatic stress disorder)      Uncomplicated asthma     Well controlled       PAST SURGICAL HISTORY:  Past Surgical History:   Procedure Laterality " Date      SECTION  2010      SECTION  10/03/2012     LAPAROSCOPIC CHOLECYSTECTOMY  2014     TONSILLECTOMY & ADENOIDECTOMY Bilateral        FAMILY HISTORY:   Family History   Problem Relation Age of Onset     Hypertension Mother      Hypertension Father      Alcoholism Maternal Grandfather      Cirrhosis Maternal Grandfather      Substance Abuse Paternal Grandfather         drugs       SOCIAL HISTORY:   Social History Questions Reviewed With Patient 2020   Which best describes your employment status (select all that apply) 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 Yes   Who do you have in your support network that can be available to help you for the first 2 weeks after surgery?  and mom   Who can you count on for support throughout your weight loss surgery journey?  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 Never   Do you currently use any of the following Nicotine products? cigarettes   Have you or are you currently using street drugs or prescription strength medication for which you do not have a prescription for? No   Do you have a history of chemical dependency (alcohol or drug abuse)? No   Currently smokes cigarettes 1-2 per day    PSYCHOLOGICAL HISTORY:   Psychological History Reviewed With Patient 2020   Have you ever attempted suicide? Never.   Have you had thoughts of suicide in the past year? No   Have you ever been hospitalized for mental illness or a suicide attempt? Never.   Do you have a history of chronic pain? No   Have you ever been diagnosed with fibromyalgia? No   Are you currently being treated for any of the following? (select all that apply) Depression, Anxiety   Are you currently seeing a therapist or counselor?  No   Are you currently seeing a psychiatrist? No   Depression  "well controlled on medication    ROS:     6/23/2020   Skin:  Leg swelling   HEENT: Headaches   Musculoskeletal: Joint Pain, Back pain, Swelling of legs   Cardiovascular: None of the above   Pulmonary: Snoring   Gastrointestinal: Heartburn, Reflux   Genitourinary: None of the above   Hematological: None of the above   Neurological: None of the above   Female only: Loss of menstrual cycles, Irregular menstrual cycles       EATING BEHAVIORS:     6/23/2020   Have you or anyone else thought that you had an eating disorder? No   Do you currently binge eat (eat a large amount of food in a short time)? No   Are you an emotional eater? No   Do you get up to eat after falling asleep? No       EXERCISE:     6/23/2020   How often do you exercise? 3 to 4 times per week   What is the duration of your exercise (in minutes)? 30 Minutes   What types of exercise do you do? walking, other   What keeps you from being more active?  Pain       MEDICATIONS:  Current Outpatient Medications   Medication Sig Dispense Refill     buPROPion (WELLBUTRIN XL) 150 MG 24 hr tablet TK 1 T PO D       escitalopram (LEXAPRO) 10 MG tablet        ESTARYLLA 0.25-35 MG-MCG tablet TK 1 T PO D       traZODone (DESYREL) 50 MG tablet TK 1 TO 2 TS PO HS PRF SLEEP       albuterol (PROAIR HFA/PROVENTIL HFA/VENTOLIN HFA) 108 (90 Base) MCG/ACT Inhaler Inhale 2 puffs into the lungs as needed       Prenatal Vit-Fe Fumarate-FA (PRENATAL VITAMIN PO) Take 1 tablet by mouth         ALLERGIES:  Allergies   Allergen Reactions     Codeine Anaphylaxis     Tramadol Other (See Comments)       LABS/IMAGING/MEDICAL RECORDS REVIEW:     PHYSICAL EXAM:  Ht 1.6 m (5' 3\")   Wt 136.1 kg (300 lb)   BMI 53.14 kg/m    GENERAL: Healthy, alert and no distress  EYES: Eyes grossly normal to inspection.  No discharge or erythema, or obvious scleral/conjunctival abnormalities.  RESP: No audible wheeze, cough, or visible cyanosis.  No visible retractions or increased work of breathing.  "   SKIN: Visible skin clear. No significant rash, abnormal pigmentation or lesions.  NEURO: Cranial nerves grossly intact.  Mentation and speech appropriate for age.  PSYCH: Mentation appears normal, affect normal/bright, judgement and insight intact, normal speech and appearance well-groomed.      In summary, Ruth Peña has Class III obesity with a body mass index of Body mass index is 53.14 kg/m . kg/m2 and the comorbidities stated above. She completed an informational seminar and is a candidate for the laparoscopic gastric sleeve.  She will have to complete the following pre-requisites:  If you have not already watched our online seminar please go to [email or web address]    See dietitian in 1 month and monthly for 6 months    See Lauren Bloch [place] pharmacist in 1 month to follow up on weight loss medications    See Laura in 3 months to follow up on pre-op weight loss and weight loss medications    Lose 20 lbs prior to surgery from 300 lbs    See Dr Mejía in 1 month for bariatric surgeon visit    Bariatric labs ordered today.  Will fax to [facility] lab.    Schedule bariatric psych eval with Leslie Carlson at  today OR Call Armaan [phone] to schedule with Lindy Zeng OR call from list given to you today in clinic    Start naltrexone 50mg daily, start with 1/2 tab daily and increase to full tab. Already taking bupropion    Consider adding topiramate at next visit    Need to quit smoking by 3 months before surgery.  Cotinine test    Bariatric Task List  Status:  Is patient a candidate for bariatric surgery?:  patient is a candidate for bariatric surgery -     Cleared to schedule surgeon consult?:    -     Status:  surgery evaluation in process -     Surgeon: Alfonzo -     Tentative surgery month/year: Dec 2020 -        Insurance: Insurance:  [employer] -        Patient Info: Initial Weight:  300 -     Date of Initial Weight/Height:  6/23/2020 -     Goal Weight (lbs):  280 -     Required Weight Loss:  20 -    "  Surgery Type:  sleeve gastrectomy -        Dietician Visits: Structured weight loss required by insurance?:  structured weight loss required -     Dietician Visit 1:  Completed -     Dietician Visit 2:  Needed -     Dietician Visit 3:  Needed -     Dietician Visit 4:  Needed -     Dietician Visit 5:  Needed -     Dietician Visit 6:  Needed -        Psychological Evaluation: Psych eval:  Needed -        Lab Work: Complete Blood Count:  Needed -     Comprehensive Metabolic Panel:  Needed -     Vitamin D:  Needed -     Hgb A1c:  Needed -     PTH:  Needed -     Nicotine Testing:  Needed -        Testing:    PCP: Establish care with PCP:  Completed -     PCP letter of support:  Needed -        Smoking: Quit tobacco use (3 months smoke free)?:  Needed -     Quit date:    -        Patient Education:  Information Session:  Completed -     Given \"Making your decision\" handout?:  Given \"\"Making your decision\"\" handout? -     Given support group information?:  support group contact information provided -     Support plan in place?:  Completed -     Research consents signed?:  research consent not signed -        Final Tasks:  Before surgery online class:  Needed -     Before surgery online class website link:  [email or web address]   After surgery online class:  Needed -     After surgery online class website link:  [email or web address]   Nurse visit for weigh-in and information:  Needed -     Pre-assessment clinic visit with anesthesia team for H&P:  Needed -     Final labs (Hgb, plt, T&S, UA):  Needed -        Notes:   -             Call Armaan Rondon at [phone] to discuss insurance coverage for bariatric surgery.  Please check with your insurance regarding bariatric surgery coverage also.    Is patient currently taking narcotic/opioid medications? no          Today in the office we discussed gastric sleeve surgery. Preoperative, perioperative, and postoperative processes, management, and " "follow up were addressed.  Risks and benefits were outlined including the risk of death, staple line leak (1-2%), PE, DVT, ulcer, worsening GERD, N/V, stricture, hernia, wound infection, weight regain, and vitamin deficiencies. I emphasized exercise and activity along with appropriate food choice as the main foundation for weight loss with surgery providing surgical reinforcement of this.  All questions were answered.  A goal sheet and support group handout were given to the patient.      Once the patient has completed the requirements in their task list and there are no further recommendations, the pt will be allowed to see the surgeon of her choice for consultation on the laparoscopic gastric sleeve surgery. Patient verbalizes understanding of the process to surgery and expectations for the postoperative period including the need for lifelong lifestyle changes, vitamin supplementation, and laboratory monitoring.      Sincerely,     Laura Cobb PA-C        The patient has been notified of following:     \"This video visit will be conducted via a call between you and your physician/provider. We have found that certain health care needs can be provided without the need for an in-person physical exam.  This service lets us provide the care you need with a video conversation.  If a prescription is necessary we can send it directly to your pharmacy.  If lab work is needed we can place an order for that and you can then stop by our lab to have the test done at a later time.    Video visits are billed at different rates depending on your insurance coverage.  Please reach out to your insurance provider with any questions.    If during the course of the call the physician/provider feels a video visit is not appropriate, you will not be charged for this service.\"    Patient has given verbal consent for Video visit? Yes    Will anyone else be joining your video visit? No      Video-Visit Details    Type of service: "  Video Visit    Video Start Time: 137  Video End Time: 151    Originating Location (pt. Location): Home    Distant Location (provider location):   [facility] SURGICAL WEIGHT MANAGEMENT     Platform used for Video Visit: Jairon Cobb PA-C        Again, thank you for allowing me to participate in the care of your patient.      Sincerely,    Laura Cobb PA-C

## 2020-06-23 NOTE — PROGRESS NOTES
"Ruth Peña is a 30 year old female who is being evaluated via a billable video visit.      The patient has been notified of following:     \"This video visit will be conducted via a call between you and your physician/provider. We have found that certain health care needs can be provided without the need for an in-person physical exam.  This service lets us provide the care you need with a video conversation.  If a prescription is necessary we can send it directly to your pharmacy.  If lab work is needed we can place an order for that and you can then stop by our lab to have the test done at a later time.    Video visits are billed at different rates depending on your insurance coverage.  Please reach out to your insurance provider with any questions.    If during the course of the call the physician/provider feels a video visit is not appropriate, you will not be charged for this service.\"    Patient has given verbal consent for Video visit? Yes    Will anyone else be joining your video visit? No        Video-Visit Details    Type of service:  Video Visit    Video Start Time: 2:39 PM  Video End Time: 3:04 PM    Originating Location (pt. Location): Home    Distant Location (provider location):  [facility] MANAGEMENT     Platform used for Video Visit: [employer]      New Bariatric Nutrition Consultation Note    Reason For Visit: Nutrition Assessment    Ruth Peña is a 30 year old presenting today for new bariatric nutrition consult.   Pt is interested in laparoscopic sleeve gastrectomy with Dr. Mejía expected surgery in Dec 2020.  Patient is accompanied by self.  This is pt's first of 6 required nutrition visits prior to surgery.     Pt referred by MYRANDA Zamarripa on June 23, 2020.  Patient with Co-morbidities of obesity including:  Type II DM no  Renal Failure no  Sleep apnea no  Hypertension no   Dyslipidemia no  Joint pain yes osteoarthritis  Back pain no  GERD yes     Support System Reviewed With " "Patient 6/23/2020   Who do you have in your support network that can be available to help you for the first 2 weeks after surgery?  and mom   Who can you count on for support throughout your weight loss surgery journey?  and mom       ANTHROPOMETRICS:  Estimated body mass index is 53.14 kg/m  as calculated from the following:    Height as of an earlier encounter on 6/23/20: 1.6 m (5' 3\").    Weight as of an earlier encounter on 6/23/20: 136.1 kg (300 lb).    Required weight loss goal pre-op: 20 lbs from initial consult weight (goal weight 280 lbs or less before surgery)       6/23/2020   I have tried the following methods to lose weight Watching portions or calories, Exercise       Weight Loss Questions Reviewed With Patient 6/23/2020   How long have you been overweight? Since early childhood       SUPPLEMENT INFORMATION:  None     NUTRITION HISTORY:  - Eats 1 meal per day around 5/6 pm. She explains that she is not hungry early in the day and is busy with the kids. 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   - She does crave sweets in the evening and with often have cookies or cake after dinner.     Recall Diet Questions Reviewed With Patient 6/23/2020   Describe what you typically consume for breakfast (typical or most recent): none   Describe what you typically consume for lunch (typical or most recent): none   Describe what you typically consume for supper (typical or most recent): pasta, fried foods   Describe what you typically consume as snacks (typical or most recent): cookies, cakes   How many ounces of water, or other low calorie drinks, do you drink daily (8 oz=1 glass)? 48 oz   How many ounces of caffeine (coffee, tea, pop) do you drink daily (8 oz=1 glass)? 8 oz- tea, sugar. Coffee sometimes    How many ounces of juice, pop, sweet tea, sports drinks, protein drinks, other sweetened drinks, do you drink daily (8 oz=1 glass)? 8 oz- none    How often do you drink " alcohol? Never       Eating Habits 6/23/2020   Do you have any dietary restrictions? No   Do you currently binge eat (eat a large amount of food in a short time)? No   Are you an emotional eater? No   Do you get up to eat after falling asleep? No   What foods do you crave? sweets       ADDITIONAL INFORMATION:  Tobacco: smokes 1-2 cigarettes per day    Dining Out History Reviewed With Patient 6/23/2020   How often do you dine out? Rarely.   Where do you dine out? (select all that apply) sit-down restaurants, fast food chains, take out   What types of food do you order when you dine out? burgers, fries       Physical Activity Reviewed With Patient 6/23/2020   How often do you exercise? 3 to 4 times per week   What is the duration of your exercise (in minutes)? 30 Minutes   What types of exercise do you do? walking, other   What keeps you from being more active?  Pain       MALNUTRITION  Video visit: visual NFPE from the waist up   % Intake: No decreased intake noted  % Weight Loss: None noted  Subcutaneous Fat Loss: None observed-Visual assessment   Muscle Loss: None observed-Visual assessment  Fluid Accumulation/Edema: Unable to assess  Malnutrition Diagnosis: Patient does not meet two of the established criteria necessary for diagnosing malnutrition    NUTRITION DIAGNOSIS:  Obesity r/t long history of self-monitoring deficit and excessive energy intake aeb BMI >30 kg/m2.    INTERVENTION:  Intervention Provided/Education:   1. Provided on post-op diet guidelines, vitamins/minerals essential post-operatively, GI anatomy of bariatric surgeries, ways to help prepare for post-op diet guidelines pre-operatively, portion/calorie-control, mindful eating and sources of protein.  2. Answered all questions at this time  3. AVS via efectivoxt    Questions Reviewed With Patient 6/23/2020   How ready are you to make changes regarding your weight? Number 1 = Not ready at all to make changes up to 10 = very ready. 10   How confident  "are you that you can change? 1 = Not confident that you will be successful making changes up to 10 = very confident. 10       Patient Understanding: good  Expected Compliance: fair-good    GOALS:  Relating To Eating:  Eat slowly (20-30 minutes per meal), chewing foods well (25 chews per bite/applesauce consistency)  9\" Plate method (1/2 plate non-starchy vegetables/fruit, 1/4 plate lean protein, 1/4 plate whole grain starch - no more than 1/2 cup carb/meal)  Eat 3 meals per day - minimum of protein after surgery is 60 grams per day  - meal ideas: 1/2 sandwich with fruit or vegetable, chicken strips with a fruit or vegetable, cottage cheese or yogurt with 1/2 cup of fruit. Egg/tuna/chicken salad with vegetables.   - Can use 1 protein shake as a meal replacement: *Protein Shake Criteria: no more than 210 Calories, at least 20 grams of protein, and less than 10 grams of sugar     Relating to beverages:  Reduce caffeine/carbonation/calorie containing beverages  Separate fluids from meals by 30 minutes before, during, and after eating    Relating to dietary supplements:  Start a multivitamin containing iron daily    Relating to activity:  Increase activity as able    Time spent with patient: 25 minutes.  Chen Enrique, MS, RD, LD      "

## 2020-06-24 NOTE — TELEPHONE ENCOUNTER
Provider E-Visit time total (minutes): 0. Cancelled E-visit, no appropriate visit for OB/GYN team.

## 2020-06-29 ENCOUNTER — TELEPHONE (OUTPATIENT)
Dept: SURGERY | Facility: CLINIC | Age: 30
End: 2020-06-29

## 2020-06-29 NOTE — TELEPHONE ENCOUNTER
Called patient to schedule appointments for pharmacist, Dr. Mejía and Laura Cobb PA-C, scheduled.   Discussed with patient that I did check with her insurance company to be sure it has bariatric surgery coverage, it does.

## 2020-07-13 ENCOUNTER — MEDICAL CORRESPONDENCE (OUTPATIENT)
Dept: HEALTH INFORMATION MANAGEMENT | Facility: CLINIC | Age: 30
End: 2020-07-13

## 2020-07-21 ENCOUNTER — CARE COORDINATION (OUTPATIENT)
Dept: SURGERY | Facility: CLINIC | Age: 30
End: 2020-07-21

## 2020-07-21 ENCOUNTER — VIRTUAL VISIT (OUTPATIENT)
Dept: SURGERY | Facility: CLINIC | Age: 30
End: 2020-07-21
Payer: COMMERCIAL

## 2020-07-21 DIAGNOSIS — E66.01 MORBID OBESITY (H): Primary | ICD-10-CM

## 2020-07-21 NOTE — PROGRESS NOTES
Tasklist updates.    Bariatric Task List  Status:  Is patient a candidate for bariatric surgery?:  patient is a candidate for bariatric surgery -     Cleared to schedule surgeon consult?:    -     Status:  surgery evaluation in process -     Surgeon: Alfonzo -     Arvind surgery month/year: Dec 2020 -        Insurance: Insurance:  [facility] -        Patient Info: Initial Weight:  300 -     Date of Initial Weight/Height:  6/23/2020 -     Goal Weight (lbs):  280 -     Required Weight Loss:  20 -     Surgery Type:  sleeve gastrectomy -        Dietician Visits: Structured weight loss required by insurance?:  structured weight loss required - Need 6 consecutive months of visits. bks   Dietician Visit 1:  Completed - 6/23/20 in Epic. bks   Dietician Visit 2:  Needed - 7/21/20 appt. [facility]   Dietician Visit 3:  Needed - August: Call [phone] to schedule. bks   Dietician Visit 4:  Needed - September: Call [phone] to schedule. bks   Dietician Visit 5:  Needed - October: Call [phone] to schedule. bks   Dietician Visit 6:  Needed - November: Call [phone] to schedule. [facility]   Dietician Visit additional:  Needed - Monthly if needed to reach goal weight. bks Call [phone] to schedule. [facility]      Psychological Evaluation: Psych eval:  Needed - Call [phone] for a [facility] Provider. [facility]      Lab Work: Complete Blood Count:  Needed -     Comprehensive Metabolic Panel:  Needed -     Vitamin D:  Needed -     Hgb A1c:  Needed -     PTH:  Needed -     Nicotine Testing:  Needed - Check one month after being tobaco/nicotine free. bks   Other:  Needed - Lipids.      Consults/ Clearance: Medical Weight Management:   -  Ongoing appts. s      PCP: Establish care with PCP:  Completed -     Follow up with PCP:    -     PCP letter of support:  Completed -  7/13/20 David Garza MD - received via fax.       Smoking: Quit tobacco use (3 months smoke free)?:  Needed -     Quit date:    -        Patient  "Education:  Information Session:  Completed -     Given \"Making your decision\" handout?:  Given \"\"Making your decision\"\" handout? -     Given support group information?:  support group contact information provided -     Attended support group?:    -     Support plan in place?:  Completed -     Research consents signed?:  research consent not signed -        Final Tasks:  Before surgery online class:  Needed -     Before surgery online class website link:  [email or web address]   After surgery online class:  Needed -     After surgery online class website link:  [email or web address]   Nurse visit for weigh-in and information:  Needed -     Pre-assessment clinic visit with anesthesia team for H&P:  Needed -     Final labs (Hgb, plt, T&S, UA):  Needed -        Notes: Please register for the Get Well Loop when you get an email invitation and a surgery date.     The Get Well Loop will give you information via email or text messages that can help you be more successful before and after surgery.  It can also help answer any questions you may have.   -         "

## 2020-07-21 NOTE — PROGRESS NOTES
"Ruth Peña is a 30 year old female who is being evaluated via a billable video visit.      The patient has been notified of following:     \"This video visit will be conducted via a call between you and your physician/provider. We have found that certain health care needs can be provided without the need for an in-person physical exam.  This service lets us provide the care you need with a video conversation.  If a prescription is necessary we can send it directly to your pharmacy.  If lab work is needed we can place an order for that and you can then stop by our lab to have the test done at a later time.    Video visits are billed at different rates depending on your insurance coverage.  Please reach out to your insurance provider with any questions.    If during the course of the call the physician/provider feels a video visit is not appropriate, you will not be charged for this service.\"    Patient has given verbal consent for Video visit? Yes    Will anyone else be joining your video visit? No        Video-Visit Details    Type of service:  Video Visit    Video Start Time: 1:02 PM  Video End Time: 1:20 PM    Originating Location (pt. Location): Home    Distant Location (provider location):  [facility]     Platform used for Video Visit: [employer]      Return Bariatric Nutrition Consultation Note    Reason For Visit: Nutrition Assessment    Ruth Peña is a 30 year old presenting today for follow-up bariatric nutrition consult.   Pt is interested in laparoscopic sleeve gastrectomy with Dr. Mejía expected surgery in Dec 2020.  Patient is accompanied by self.  This is pt's second of 6 required nutrition visits prior to surgery.     Pt referred by MYRANDA Zamarripa on June 23, 2020.  Patient with Co-morbidities of obesity including:  Type II DM no  Renal Failure no  Sleep apnea no  Hypertension no   Dyslipidemia no  Joint pain yes osteoarthritis  Back pain no  GERD yes     Support System Reviewed " "With Patient 6/23/2020   Who do you have in your support network that can be available to help you for the first 2 weeks after surgery?  and mom   Who can you count on for support throughout your weight loss surgery journey?  and mom       ANTHROPOMETRICS:  Estimated body mass index is 53.14 kg/m  as calculated from the following:    Height as of 6/23/20: 1.6 m (5' 3\").    Weight as of 6/23/20: 136.1 kg (300 lb).     Does not have a home scale  [facility] and clinic (pt cannot remember weight, but knows it was over 300 lb )     Required weight loss goal pre-op: 20 lbs from initial consult weight (goal weight 280 lbs or less before surgery)       6/23/2020   I have tried the following methods to lose weight Watching portions or calories, Exercise       Weight Loss Questions Reviewed With Patient 6/23/2020   How long have you been overweight? Since early childhood       SUPPLEMENT INFORMATION:  MVI     NUTRITION HISTORY:  - Eats 1 meal per day around 5/6 pm. She explains that she is not hungry early in the day and is busy with the kids. 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   - She does crave sweets in the evening and with often have cookies or cake after dinner.     Recall Diet Questions Reviewed With Patient 6/23/2020   Describe what you typically consume for breakfast (typical or most recent): none   Describe what you typically consume for lunch (typical or most recent): none   Describe what you typically consume for supper (typical or most recent): pasta, fried foods   Describe what you typically consume as snacks (typical or most recent): cookies, cakes   How many ounces of water, or other low calorie drinks, do you drink daily (8 oz=1 glass)? 48 oz   How many ounces of caffeine (coffee, tea, pop) do you drink daily (8 oz=1 glass)? 8 oz- tea, sugar. Coffee sometimes    How many ounces of juice, pop, sweet tea, sports drinks, protein drinks, other sweetened drinks, " "do you drink daily (8 oz=1 glass)? 8 oz- none    How often do you drink alcohol? Never       Eating Habits 6/23/2020   Do you have any dietary restrictions? No   Do you currently binge eat (eat a large amount of food in a short time)? No   Are you an emotional eater? No   Do you get up to eat after falling asleep? No   What foods do you crave? sweets     Progress Towards Previous Goals:  Eat slowly (20-30 minutes per meal), chewing foods well (25 chews per bite/applesauce consistency)- Improving   9\" Plate method (1/2 plate non-starchy vegetables/fruit, 1/4 plate lean protein, 1/4 plate whole grain starch - no more than 1/2 cup carb/meal)-Improving using the smaller plate  - Tacos 1   - trying to eat 2 times daily, in the morning and at dinner time. (yogurt and fruit   Eat 3 meals per day - minimum of protein after surgery is 60 grams per day  - meal ideas: 1/2 sandwich with fruit or vegetable, chicken strips with a fruit or vegetable, cottage cheese or yogurt with 1/2 cup of fruit. Egg/tuna/chicken salad with vegetables.   - Can use 1 protein shake as a meal replacement: *Protein Shake Criteria: no more than 210 Calories, at least 20 grams of protein, and less than 10 grams of sugar     Relating to beverages:  Reduce caffeine/carbonation/calorie containing beverages-Met, water (has not had tea in a long   Separate fluids from meals by 30 minutes before, during, and after eating    Relating to dietary supplements:  Start a multivitamin containing iron daily-Met    Relating to activity:  Increase activity as able    ADDITIONAL INFORMATION:  Tobacco: smokes 1-2 cigarettes per day    Dining Out History Reviewed With Patient 6/23/2020   How often do you dine out? Rarely.   Where do you dine out? (select all that apply) sit-down restaurants, fast food chains, take out   What types of food do you order when you dine out? fatoumata santiago       Physical Activity Reviewed With Patient 6/23/2020   How often do you exercise? 3 " "to 4 times per week   What is the duration of your exercise (in minutes)? 30 Minutes   What types of exercise do you do? walking, other   What keeps you from being more active?  Pain       MALNUTRITION  Video visit: visual NFPE from the waist up   % Intake: No decreased intake noted  % Weight Loss: None noted  Subcutaneous Fat Loss: None observed-Visual assessment   Muscle Loss: None observed-Visual assessment  Fluid Accumulation/Edema: Unable to assess  Malnutrition Diagnosis: Patient does not meet two of the established criteria necessary for diagnosing malnutrition    NUTRITION DIAGNOSIS:  Obesity r/t long history of self-monitoring deficit and excessive energy intake aeb BMI >30 kg/m2.    INTERVENTION:  Intervention Provided/Education:   1. Provided on post-op diet guidelines, vitamins/minerals essential post-operatively, GI anatomy of bariatric surgeries, ways to help prepare for post-op diet guidelines pre-operatively, portion/calorie-control, mindful eating and sources of protein.  2. Answered all questions at this time  3. AVS via "[employer]    Questions Reviewed With Patient 6/23/2020   How ready are you to make changes regarding your weight? Number 1 = Not ready at all to make changes up to 10 = very ready. 10   How confident are you that you can change? 1 = Not confident that you will be successful making changes up to 10 = very confident. 10       Patient Understanding: good  Expected Compliance: fair-good    GOALS:  Relating To Eating:  Eat slowly (20-30 minutes per meal), chewing foods well (25 chews per bite/applesauce consistency)  9\" Plate method (1/2 plate non-starchy vegetables/fruit, 1/4 plate lean protein, 1/4 plate whole grain starch - no more than 1/2 cup carb/meal)  Eat 3 meals per day - minimum of protein after surgery is 60 grams per day  - meal ideas: 1/2 sandwich with fruit or vegetable, chicken strips with a fruit or vegetable, cottage cheese or yogurt with 1/2 cup of fruit. " Egg/tuna/chicken salad with vegetables.   - Can use 1 protein shake as a meal replacement: *Protein Shake Criteria: no more than 210 Calories, at least 20 grams of protein, and less than 10 grams of sugar     Relating to beverages:  Reduce caffeine/carbonation/calorie containing beverages  Separate fluids from meals by 30 minutes before, during, and after eating    Relating to dietary supplements:  Start a multivitamin containing iron daily    Relating to activity:  Increase activity as able     CONTACT INFORMATION    Fax # [phone]   -Preoperative documents to complete tasks on Tasklist.   -FMLA or return to work forms    Please call [place] (ph: [phone] or send a [employer] message) within the month to update your Tasklist and prepare for the next steps.     Time spent with patient: 18 minutes.  Chen Enrique, MS, RD, LD

## 2020-07-21 NOTE — LETTER
"7/21/2020       RE: Ruth Peña  [address]  [facility] [address]     Dear Colleague,    Thank you for referring your patient, Ruth Peña, to the [facility] at [facility]. Please see a copy of my visit note below.    Ruth Peña is a 30 year old female who is being evaluated via a billable video visit.        Video-Visit Details    Type of service:  Video Visit    Video Start Time: 1:02 PM  Video End Time: 1:20 PM    Originating Location (pt. Location): Home    Distant Location (provider location):  [facility]     Platform used for Video Visit: [facility]      Return Bariatric Nutrition Consultation Note    Reason For Visit: Nutrition Assessment    Ruth Peña is a 30 year old presenting today for follow-up bariatric nutrition consult.   Pt is interested in laparoscopic sleeve gastrectomy with Dr. Mejía expected surgery in Dec 2020.  Patient is accompanied by self.  This is pt's second of 6 required nutrition visits prior to surgery.     Pt referred by MYRANDA Zamarripa on June 23, 2020.  Patient with Co-morbidities of obesity including:  Type II DM no  Renal Failure no  Sleep apnea no  Hypertension no   Dyslipidemia no  Joint pain yes osteoarthritis  Back pain no  GERD yes     Support System Reviewed With Patient 6/23/2020   Who do you have in your support network that can be available to help you for the first 2 weeks after surgery?  and mom   Who can you count on for support throughout your weight loss surgery journey?  and mom       ANTHROPOMETRICS:  Estimated body mass index is 53.14 kg/m  as calculated from the following:    Height as of 6/23/20: 1.6 m (5' 3\").    Weight as of 6/23/20: 136.1 kg (300 lb).     Does not have a home scale  [facility] (pt cannot remember weight, but knows it was over 300 lb )     Required weight loss goal pre-op: 20 lbs from initial consult weight (goal weight 280 " "lbs or less before surgery)       6/23/2020   I have tried the following methods to lose weight Watching portions or calories, Exercise       Weight Loss Questions Reviewed With Patient 6/23/2020   How long have you been overweight? Since early childhood       SUPPLEMENT INFORMATION:  MVI     NUTRITION HISTORY:  - Eats 1 meal per day around 5/6 pm. She explains that she is not hungry early in the day and is busy with the kids. 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   - She does crave sweets in the evening and with often have cookies or cake after dinner.     Recall Diet Questions Reviewed With Patient 6/23/2020   Describe what you typically consume for breakfast (typical or most recent): none   Describe what you typically consume for lunch (typical or most recent): none   Describe what you typically consume for supper (typical or most recent): pasta, fried foods   Describe what you typically consume as snacks (typical or most recent): cookies, cakes   How many ounces of water, or other low calorie drinks, do you drink daily (8 oz=1 glass)? 48 oz   How many ounces of caffeine (coffee, tea, pop) do you drink daily (8 oz=1 glass)? 8 oz- tea, sugar. Coffee sometimes    How many ounces of juice, pop, sweet tea, sports drinks, protein drinks, other sweetened drinks, do you drink daily (8 oz=1 glass)? 8 oz- none    How often do you drink alcohol? Never       Eating Habits 6/23/2020   Do you have any dietary restrictions? No   Do you currently binge eat (eat a large amount of food in a short time)? No   Are you an emotional eater? No   Do you get up to eat after falling asleep? No   What foods do you crave? sweets     Progress Towards Previous Goals:  Eat slowly (20-30 minutes per meal), chewing foods well (25 chews per bite/applesauce consistency)- Improving   9\" Plate method (1/2 plate non-starchy vegetables/fruit, 1/4 plate lean protein, 1/4 plate whole grain starch - no more than 1/2 cup " carb/meal)-Improving using the smaller plate  - Tacos 1   - trying to eat 2 times daily, in the morning and at dinner time. (yogurt and fruit   Eat 3 meals per day - minimum of protein after surgery is 60 grams per day  - meal ideas: 1/2 sandwich with fruit or vegetable, chicken strips with a fruit or vegetable, cottage cheese or yogurt with 1/2 cup of fruit. Egg/tuna/chicken salad with vegetables.   - Can use 1 protein shake as a meal replacement: *Protein Shake Criteria: no more than 210 Calories, at least 20 grams of protein, and less than 10 grams of sugar     Relating to beverages:  Reduce caffeine/carbonation/calorie containing beverages-Met, water (has not had tea in a long   Separate fluids from meals by 30 minutes before, during, and after eating    Relating to dietary supplements:  Start a multivitamin containing iron daily-Met    Relating to activity:  Increase activity as able    ADDITIONAL INFORMATION:  Tobacco: smokes 1-2 cigarettes per day    Dining Out History Reviewed With Patient 6/23/2020   How often do you dine out? Rarely.   Where do you dine out? (select all that apply) sit-down restaurants, fast food chains, take out   What types of food do you order when you dine out? fatoumata santiago       Physical Activity Reviewed With Patient 6/23/2020   How often do you exercise? 3 to 4 times per week   What is the duration of your exercise (in minutes)? 30 Minutes   What types of exercise do you do? walking, other   What keeps you from being more active?  Pain       MALNUTRITION  Video visit: visual NFPE from the waist up   % Intake: No decreased intake noted  % Weight Loss: None noted  Subcutaneous Fat Loss: None observed-Visual assessment   Muscle Loss: None observed-Visual assessment  Fluid Accumulation/Edema: Unable to assess  Malnutrition Diagnosis: Patient does not meet two of the established criteria necessary for diagnosing malnutrition    NUTRITION DIAGNOSIS:  Obesity r/t long history of  "self-monitoring deficit and excessive energy intake aeb BMI >30 kg/m2.    INTERVENTION:  Intervention Provided/Education:   1. Provided on post-op diet guidelines, vitamins/minerals essential post-operatively, GI anatomy of bariatric surgeries, ways to help prepare for post-op diet guidelines pre-operatively, portion/calorie-control, mindful eating and sources of protein.  2. Answered all questions at this time  3. AVS via [employer]    Questions Reviewed With Patient 6/23/2020   How ready are you to make changes regarding your weight? Number 1 = Not ready at all to make changes up to 10 = very ready. 10   How confident are you that you can change? 1 = Not confident that you will be successful making changes up to 10 = very confident. 10       Patient Understanding: good  Expected Compliance: fair-good    GOALS:  Relating To Eating:  Eat slowly (20-30 minutes per meal), chewing foods well (25 chews per bite/applesauce consistency)  9\" Plate method (1/2 plate non-starchy vegetables/fruit, 1/4 plate lean protein, 1/4 plate whole grain starch - no more than 1/2 cup carb/meal)  Eat 3 meals per day - minimum of protein after surgery is 60 grams per day  - meal ideas: 1/2 sandwich with fruit or vegetable, chicken strips with a fruit or vegetable, cottage cheese or yogurt with 1/2 cup of fruit. Egg/tuna/chicken salad with vegetables.   - Can use 1 protein shake as a meal replacement: *Protein Shake Criteria: no more than 210 Calories, at least 20 grams of protein, and less than 10 grams of sugar     Relating to beverages:  Reduce caffeine/carbonation/calorie containing beverages  Separate fluids from meals by 30 minutes before, during, and after eating    Relating to dietary supplements:  Start a multivitamin containing iron daily    Relating to activity:  Increase activity as able     CONTACT INFORMATION    Fax # [phone]   -Preoperative documents to complete tasks on Tasklist.   -FMLA or return to work forms    Please " call [place] (ph: [phone] or send a [employer] message) within the month to update your Tasklist and prepare for the next steps.     Time spent with patient: 18 minutes.  Chen Enrique MS, RD, LD

## 2020-07-21 NOTE — PATIENT INSTRUCTIONS
"GOALS:  Relating To Eating:  Eat slowly (20-30 minutes per meal), chewing foods well (25 chews per bite/applesauce consistency)  9\" Plate method (1/2 plate non-starchy vegetables/fruit, 1/4 plate lean protein, 1/4 plate whole grain starch - no more than 1/2 cup carb/meal)  Eat 3 meals per day - minimum of protein after surgery is 60 grams per day  - meal ideas: 1/2 sandwich with fruit or vegetable, chicken strips with a fruit or vegetable, cottage cheese or yogurt with 1/2 cup of fruit. Egg/tuna/chicken salad with vegetables.   - Can use 1 protein shake as a meal replacement: *Protein Shake Criteria: no more than 210 Calories, at least 20 grams of protein, and less than 10 grams of sugar     Relating to beverages:  Reduce caffeine/carbonation/calorie containing beverages  Separate fluids from meals by 30 minutes before, during, and after eating    Relating to dietary supplements:  Start a multivitamin containing iron daily    Relating to activity:  Increase activity as able     CONTACT INFORMATION    Fax # [phone]   -Preoperative documents to complete tasks on Tasklist.   -FMLA or return to work forms    Please call [place] (ph: [phone] or send a [employer] message) within the month to update your Tasklist and prepare for the next steps.     Follow up with RD in one month    Chen Enrique, MS, RD, LD  If you need to schedule or reschedule with a dietitian please call [phone].  "

## 2020-07-31 ENCOUNTER — VIRTUAL VISIT (OUTPATIENT)
Dept: SURGERY | Facility: CLINIC | Age: 30
End: 2020-07-31
Payer: COMMERCIAL

## 2020-07-31 VITALS — WEIGHT: 293 LBS | HEIGHT: 62 IN | BODY MASS INDEX: 53.92 KG/M2

## 2020-07-31 DIAGNOSIS — E66.01 MORBID OBESITY WITH BMI OF 60.0-69.9, ADULT (H): Primary | ICD-10-CM

## 2020-07-31 ASSESSMENT — MIFFLIN-ST. JEOR: SCORE: 2248.16

## 2020-07-31 ASSESSMENT — PAIN SCALES - GENERAL: PAINLEVEL: NO PAIN (0)

## 2020-07-31 NOTE — LETTER
"7/31/2020       RE: Ruth Peña  [address] [facility] [address]     Dear Colleague,    Thank you for referring your patient, Ruth Peña, to the [facility] at [facility]. Please see a copy of my visit note below.    Ruth Peña is a 30 year old female who is being evaluated via a billable video visit.      The patient has been notified of following:     \"This video visit will be conducted via a call between you and your physician/provider. We have found that certain health care needs can be provided without the need for an in-person physical exam.  This service lets us provide the care you need with a video conversation.  If a prescription is necessary we can send it directly to your pharmacy.  If lab work is needed we can place an order for that and you can then stop by our lab to have the test done at a later time.    Video visits are billed at different rates depending on your insurance coverage.  Please reach out to your insurance provider with any questions.    If during the course of the call the physician/provider feels a video visit is not appropriate, you will not be charged for this service.\"    Patient has given verbal consent for Video visit? Yes  How would you like to obtain your AVS? MyChart  If you are dropped from the video visit, the video invite should be resent to: MyChart first, then Text to cell phone: [phone]  Will anyone else be joining your video visit? No        Video-Visit Details    Type of service:  Video Visit    Video Start Time: 1126  Video End Time: 11:32 AM    Originating Location (pt. Location): Home    Distant Location (provider location):  [facility]     Platform used for Video Visit: Amanda Mejía MD    Dear Dr. Wyatt, Anna Ross,      Referring provider:       6/23/2020   Who referred you? [facility] and [facility]     I was asked to see the patient regarding " "obesity by the referring provider above.    I had the pleasure of meeting with your patient Ruth Peña in our weight loss surgery office.  This patient is a 30 year old female who has been undergoing our thorough preoperative screening process in anticipation of potential bariatric surgery.    At initial evaluation we recorded Ruth Peña's Height: 157.5 cm (5' 2\"), Initial Weight (lbs): 300 lbs and goal is 280.  The patient has been unsuccessful with other methods of permanent weight loss and suffers from multiple weight related medical conditions.  Due to lack of success in achieving weight loss through other methods, she is interested in undergoing bariatric surgery.    Pt still smoking    Pt still a long way to go before surgery.    PREVIOUS WEIGHT LOSS ATTEMPTS:     6/23/2020   I have tried the following methods to lose weight Watching portions or calories, Exercise       CO-MORBIDITIES OF OBESITY INCLUDE:     6/23/2020   I have the following health issues associated with obesity: GERD (Reflux), Asthma, Osteoarthritis (joint disease)       VITALS:  Ht 1.575 m (5' 2\")   Wt (!) 156.5 kg (345 lb)   BMI 63.10 kg/m      PE:  Video visit    In summary, she has undergone an appropriate medical evaluation, dietitian evaluation, as well as psychologic screening. The patient appears to be an appropriate candidate for bariatric surgery AFTER 2 months of smoking cessation and meeting goal weight.    Full discussion of risks of sleeve gastrectomy deferred today, as we will meet again loser to surgery.    -Complete checklist  -Smoking cessation  -RTC 1 mo pre-surgery          Sincerely,    Alex Mejía MD    Please route or send letter to:  Primary Care Provider (PCP) and Referring Provider      Again, thank you for allowing me to participate in the care of your patient.      Sincerely,    Alex Mejía MD      "

## 2020-07-31 NOTE — PROGRESS NOTES
"Ruth Peña is a 30 year old female who is being evaluated via a billable video visit.      The patient has been notified of following:     \"This video visit will be conducted via a call between you and your physician/provider. We have found that certain health care needs can be provided without the need for an in-person physical exam.  This service lets us provide the care you need with a video conversation.  If a prescription is necessary we can send it directly to your pharmacy.  If lab work is needed we can place an order for that and you can then stop by our lab to have the test done at a later time.    Video visits are billed at different rates depending on your insurance coverage.  Please reach out to your insurance provider with any questions.    If during the course of the call the physician/provider feels a video visit is not appropriate, you will not be charged for this service.\"    Patient has given verbal consent for Video visit? Yes  How would you like to obtain your AVS? MyChart  If you are dropped from the video visit, the video invite should be resent to: MyChart first, then Text to cell phone: [phone]  Will anyone else be joining your video visit? No        Video-Visit Details    Type of service:  Video Visit    Video Start Time: 1126  Video End Time: 11:32 AM    Originating Location (pt. Location): Home    Distant Location (provider location):  [facility] SURGICAL WEIGHT MANAGEMENT     Platform used for Video Visit: [employer]    Alex Mejía MD    Dear Dr. Wyatt, [facility],      Referring provider:       6/23/2020   Who referred you? [facility]     I was asked to see the patient regarding obesity by the referring provider above.    I had the pleasure of meeting with your patient Ruth Peña in our weight loss surgery office.  This patient is a 30 year old female who has been undergoing our thorough preoperative screening process in anticipation of potential " "bariatric surgery.    At initial evaluation we recorded Ruth Peña's Height: 157.5 cm (5' 2\"), Initial Weight (lbs): 300 lbs and goal is 280.  The patient has been unsuccessful with other methods of permanent weight loss and suffers from multiple weight related medical conditions.  Due to lack of success in achieving weight loss through other methods, she is interested in undergoing bariatric surgery.    Pt still smoking    Pt still a long way to go before surgery.    PREVIOUS WEIGHT LOSS ATTEMPTS:     6/23/2020   I have tried the following methods to lose weight Watching portions or calories, Exercise       CO-MORBIDITIES OF OBESITY INCLUDE:     6/23/2020   I have the following health issues associated with obesity: GERD (Reflux), Asthma, Osteoarthritis (joint disease)       VITALS:  Ht 1.575 m (5' 2\")   Wt (!) 156.5 kg (345 lb)   BMI 63.10 kg/m      PE:  Video visit    In summary, she has undergone an appropriate medical evaluation, dietitian evaluation, as well as psychologic screening. The patient appears to be an appropriate candidate for bariatric surgery AFTER 2 months of smoking cessation and meeting goal weight.    Full discussion of risks of sleeve gastrectomy deferred today, as we will meet again loser to surgery.    -Complete checklist  -Smoking cessation  -RTC 1 mo pre-surgery          Sincerely,    Alex Mejía MD    Please route or send letter to:  Primary Care Provider (PCP) and Referring Provider    "

## 2020-07-31 NOTE — LETTER
Date:August 5, 2020      Patient was self referred, no letter generated. Do not send.        [facility] Health Information

## 2020-07-31 NOTE — NURSING NOTE
"Chief Complaint   Patient presents with     Consult     Meet and greet appointment.       Vitals:    07/31/20 1100   Weight: (!) 156.5 kg (345 lb)   Height: 1.575 m (5' 2\")       Body mass index is 63.1 kg/m .                            ABIGAIL TINEO, EMT    "

## 2020-08-03 ENCOUNTER — TELEPHONE (OUTPATIENT)
Dept: PHARMACY | Facility: CLINIC | Age: 30
End: 2020-08-03

## 2020-08-03 NOTE — TELEPHONE ENCOUNTER
Tried calling patient x 2 for initial Medication Therapy Management (MTM) appointment. No answer. Left VM and CB# for rescheduling.     Lauren Bloch, PharmD  Medication Therapy Management Pharmacist   [facility]   Phone: [phone]

## 2020-11-22 ENCOUNTER — HEALTH MAINTENANCE LETTER (OUTPATIENT)
Age: 30
End: 2020-11-22

## 2021-09-19 ENCOUNTER — HEALTH MAINTENANCE LETTER (OUTPATIENT)
Age: 31
End: 2021-09-19

## 2022-01-09 ENCOUNTER — HEALTH MAINTENANCE LETTER (OUTPATIENT)
Age: 32
End: 2022-01-09

## 2022-01-31 ENCOUNTER — TELEPHONE (OUTPATIENT)
Dept: NURSING | Facility: CLINIC | Age: 32
End: 2022-01-31

## 2022-01-31 ENCOUNTER — HOSPITAL ENCOUNTER (EMERGENCY)
Facility: HOSPITAL | Age: 32
Discharge: LEFT BEFORE DISCHARGE | End: 2022-01-31
Attending: EMERGENCY MEDICINE | Admitting: EMERGENCY MEDICINE
Payer: COMMERCIAL

## 2022-01-31 VITALS
HEIGHT: 62 IN | TEMPERATURE: 98 F | BODY MASS INDEX: 42.33 KG/M2 | DIASTOLIC BLOOD PRESSURE: 71 MMHG | SYSTOLIC BLOOD PRESSURE: 135 MMHG | RESPIRATION RATE: 18 BRPM | WEIGHT: 230 LBS | OXYGEN SATURATION: 100 % | HEART RATE: 85 BPM

## 2022-01-31 DIAGNOSIS — Z20.822 PERSON UNDER INVESTIGATION FOR COVID-19: ICD-10-CM

## 2022-01-31 LAB
FLUAV RNA SPEC QL NAA+PROBE: NEGATIVE
FLUBV RNA RESP QL NAA+PROBE: NEGATIVE
SARS-COV-2 RNA RESP QL NAA+PROBE: POSITIVE

## 2022-01-31 PROCEDURE — 99283 EMERGENCY DEPT VISIT LOW MDM: CPT

## 2022-01-31 PROCEDURE — C9803 HOPD COVID-19 SPEC COLLECT: HCPCS

## 2022-01-31 PROCEDURE — 87636 SARSCOV2 & INF A&B AMP PRB: CPT | Performed by: EMERGENCY MEDICINE

## 2022-01-31 RX ORDER — ONDANSETRON 2 MG/ML
4 INJECTION INTRAMUSCULAR; INTRAVENOUS ONCE
Status: DISCONTINUED | OUTPATIENT
Start: 2022-01-31 | End: 2022-01-31 | Stop reason: HOSPADM

## 2022-01-31 ASSESSMENT — MIFFLIN-ST. JEOR: SCORE: 1721.52

## 2022-01-31 NOTE — ED TRIAGE NOTES
Pt presents with flu like symptoms for 2 weeks, last week worse. Last week was not even able to get out of bed. Pt has not been Covid vaccinated. Has had no known contact with Covid positive persons.

## 2022-01-31 NOTE — ED PROVIDER NOTES
EMERGENCY DEPARTMENT ENCOUNTER      NAME: Ruth Goel  AGE: 31 year old female  YOB: 1990  MRN: [id number]  EVALUATION DATE & TIME: 2022  7:47 AM    PCP: No Ref-Primary, Physician    ED PROVIDER: Scottie Ramsey D.O.      Chief Complaint   Patient presents with     Flu Symptoms       FINAL IMPRESSION:  1. Person under investigation for COVID-19        ED COURSE & MEDICAL DECISION MAKIN:40 AM I met with the patient to gather history and to perform my initial exam. I discussed the plan for care while in the Emergency Department.         Pertinent Labs & Imaging studies reviewed. (See chart for details)  31 year old female presents to the Emergency Department for evaluation of cough, sore throat, body aches, fatigue, and symptoms consistent with COVID-19.   has the same symptoms, except for her nausea and vomiting which is unique to her.  Covid test and influenza test were drawn.  Did plan on lab testing and IV fluids, however prior to blood draw and Covid results returning, the patient left AMA without announcement.  Covid test did come back positive.  Did call the patient to tell her results over the phone, but calls to both her and her emergency contact (spouse) had no response or ability to leave message.    At the conclusion of the encounter I discussed the results of all of the tests and the disposition. The questions were answered. The patient or family acknowledged understanding and was agreeable with the care plan.      HPI    Patient information was obtained from: patient    Use of : N/A     Ruth Goel is a 31 year old female with no pertinent medical history on file who presents to this ED via walk-in for evaluation of COVID-19.    Patient reports that for approximately one week she has been sick with symptoms of COVID-19. She endorses having myalgias, fever, fatigue, cough, shortness of breath, and headache for the past week. She additionally endorses recently  having nausea and vomiting. She denies any chest pain, abdominal pain, or any other complications at this time.     Patient denies any known pertinent medical history. She denies any surgical history. She denies any alcohol use or smoking.       REVIEW OF SYSTEMS  Constitutional:  Positive for fever. Positive for fatigue. Denies, chills, weight loss or weakness  Eyes:  No pain, discharge, redness  HENT:  Denies sore throat, ear pain, congestion  Respiratory: Positive for SOB Positive for cough. No wheezing.  Cardiovascular:  No CP, palpitations  GI:  Positive for nausea. Positive for vomiting. Denies abdominal pain or diarrhea.   : Denies dysuria, hematuria  Musculoskeletal:  Positive for myalgias. Denies any swelling or loss of function.  Skin:  Denies rash, pallor  Neurologic:  Positive for headache. Denies focal weakness or sensory changes.   Lymph: Denies swollen nodes    All other systems negative unless noted in HPI.    PAST MEDICAL HISTORY:  History reviewed. No pertinent past medical history.    PAST SURGICAL HISTORY:  History reviewed. No pertinent surgical history.      CURRENT MEDICATIONS:    No current facility-administered medications for this encounter.     No current outpatient medications on file.         ALLERGIES:  No Known Allergies    FAMILY HISTORY:  No family history on file.    SOCIAL HISTORY:  Social History     Socioeconomic History     Marital status:      Spouse name: Not on file     Number of children: Not on file     Years of education: Not on file     Highest education level: Not on file   Occupational History     Not on file   Tobacco Use     Smoking status: Not on file     Smokeless tobacco: Not on file   Substance and Sexual Activity     Alcohol use: Not on file     Drug use: Not on file     Sexual activity: Not on file   Other Topics Concern     Not on file   Social History Narrative     Not on file     Social Determinants of Health     Financial Resource Strain: Not on  "file   Food Insecurity: Not on file   Transportation Needs: Not on file   Physical Activity: Not on file   Stress: Not on file   Social Connections: Not on file   Intimate Partner Violence: Not on file   Housing Stability: Not on file       VITALS:  Patient Vitals for the past 24 hrs:   BP Temp Temp src Pulse Resp SpO2 Height Weight   01/31/22 0737 135/71 98  F (36.7  C) Temporal 85 18 100 % 1.575 m (5' 2\") 104.3 kg (230 lb)       PHYSICAL EXAM    VITAL SIGNS: /71   Pulse 85   Temp 98  F (36.7  C) (Temporal)   Resp 18   Ht 1.575 m (5' 2\")   Wt 104.3 kg (230 lb)   LMP 12/01/2021   SpO2 100%   BMI 42.07 kg/m      General Appearance: Well-appearing, well-nourished, no acute distress   Head:  Normocephalic, without obvious abnormality, atraumatic  Eyes:  PERRL, conjunctiva/corneas clear, EOM's intact,  ENT:  Lips, mucosa, and tongue normal, membranes are moist without pallor  Neck:  Normal ROM, symmetrical, trachea midline    Cardio:  Regular rate and rhythm, no murmur, rub or gallop, 2+ pulses symmetric in all extremities  Pulm:  Clear to auscultation bilaterally, respirations unlabored,  Abdomen:  Soft, non-tender, no rebound or guarding.  Musculoskeletal: Full ROM, no edema, no cyanosis, good ROM of major joints  Integument:  Warm, Dry, No erythema, No rash.    Neurologic:  Alert & oriented.  No focal deficits appreciated.  Ambulatory.  Psychiatric:  Affect normal, Judgment normal, Mood normal.      LABS  Results for orders placed or performed during the hospital encounter of 01/31/22 (from the past 24 hour(s))   Symptomatic; Unknown Influenza A/B & SARS-CoV2 (COVID-19) Virus PCR Multiplex Nasopharyngeal    Specimen: Nasopharyngeal; Swab   Result Value Ref Range    Influenza A PCR Negative Negative    Influenza B PCR Negative Negative    SARS CoV2 PCR Positive (A) Negative    Narrative    Testing was performed using the shawn SARS-CoV-2 & Influenza A/B Assay on the shawn Diann System. This test should be " ordered for the detection of SARS-CoV-2 and influenza viruses in individuals who meet clinical and/or epidemiological criteria. Test performance is unknown in asymptomatic patients. This test is for in vitro diagnostic use under the FDA EUA for laboratories certified under CLIA to perform moderate and/or high complexity testing. This test has not been FDA cleared or approved. A negative result does not rule out the presence of PCR inhibitors in the specimen or target RNA in concentration below the limit of detection for the assay. If only one viral target is positive but coinfection with multiple targets is suspected, the sample should be re-tested with another FDA cleared, approved or authorized test, if coinfection would change clinical management. [facility] are certified under the Clinical Laboratory Improvement Amendments of 1988 (CLIA-88) as  qualified to perform moderate and/or high complexity laboratory testing.                RADIOLOGY  None.    EKG:    None.       MEDICATIONS GIVEN IN THE EMERGENCY:  Medications - No data to display    NEW PRESCRIPTIONS STARTED AT TODAY'S ER VISIT  There are no discharge medications for this patient.           I, Jaydon Dooley, am serving as a scribe to document services personally performed by Scottie Ramsey D.O., based on my observation and the provider s statements to me. I, Scottie Ramsey D.O., attest that Jaydon Dooley is acting in a scribe capacity, has observed my performance of the services and has documented them in accordance with my direction.    Scottie Ramsey D.O.  Emergency Medicine  [facility] EMERGENCY DEPARTMENT  [address] [place] [id number]  [phone]  Dept: [phone]       Scottie Ramsey,   01/31/22 1117

## 2022-01-31 NOTE — TELEPHONE ENCOUNTER
Patient classified as COVID treatment eligible by Epic high risk algorithm:  Yes  Is the patient symptomatic at the time of result notification?   UNKNOWN - PT DID NOT ANSWER - UNABLE TO LEAVE VOICEMAIL     Coronavirus (COVID-19) Notification    Reason for call  Notify of POSITIVE COVID-19 lab result, assess symptoms,  review [facility] recommendations    Lab Result   Lab test for 2019-nCoV rRt-PCR or SARS-COV-2 PCR  Oropharyngeal AND/OR nasopharyngeal swabs were POSITIVE for 2019-nCoV RNA [OR] SARS-COV-2 RNA (COVID-19) RNA     We have been unable to reach patient by phone at this time to notify of their Positive COVID-19 result.    UNABLE to leave voicemail message requesting a call back to [phone] [facility] for results.        A Positive COVID-19 letter will be sent via [facility] or the mail. (Exception, no letters sent to Presurgerical/Preprocedure Patients)    Valery Kraus

## 2022-03-25 ENCOUNTER — NURSE TRIAGE (OUTPATIENT)
Dept: NURSING | Facility: CLINIC | Age: 32
End: 2022-03-25
Payer: COMMERCIAL

## 2022-03-25 NOTE — TELEPHONE ENCOUNTER
"Ruth has been using Fentanyl up until yesterday  She has found out she is pregnant.    She is wondering what the withdrawal symptoms are of Fentanyl and if there would be harm to her pregnancy    - \"In a lot of pain\" - back, abdomen  - Nauseated  - Cramping, lower abdomen/pelvis  - Shaking/tremors    ER advised    COVID 19 Nurse Triage Plan/Patient Instructions    Please be aware that novel coronavirus (COVID-19) may be circulating in the community. If you develop symptoms such as fever, cough, or SOB or if you have concerns about the presence of another infection including coronavirus (COVID-19), please contact your health care provider or visit [email or web address].     Disposition/Instructions    ED Visit recommended. Follow protocol based instructions.     Bring Your Own Device:  Please also bring your smart device(s) (smart phones, tablets, laptops) and their charging cables for your personal use and to communicate with your care team during your visit.    Thank you for taking steps to prevent the spread of this virus.  o Limit your contact with others.  o Wear a simple mask to cover your cough.  o Wash your hands well and often.    Resources    [facility]: About COVID-19: [email or web address]    CDC: What to Do If You're Sick: www.cdc.gov/coronavirus/2019-ncov/about/steps-when-sick.html    CDC: Ending Home Isolation: www.cdc.gov/coronavirus/2019-ncov/hcp/disposition-in-home-patients.html     CDC: Caring for Someone: www.cdc.gov/coronavirus/2019-ncov/if-you-are-sick/care-for-someone.html     [facility]: Interim Guidance for Hospital Discharge to Home: www.health.[facility].mn.us/diseases/coronavirus/hcp/hospdischarge.pdf    [facility] clinical trials (COVID-19 research studies): [email or web address]     Below are the COVID-19 hotlines at the [facility] ([facility]). Interpreters are available.   o For health questions: Call 447-000-1049 or " 1-557.684.2797 (7 a.m. to 7 p.m.)  o For questions about schools and childcare: Call 739-951-8860 or 1-483.812.2552 (7 a.m. to 7 p.m.)     [facility] Nurse Advisors      Reason for Disposition    [1] Pregnant AND [2] symptoms of narcotic withdrawal (e.g., vomiting, severe muscle cramps)    Additional Information    Negative: Coma (e.g., not moving, not talking, not responding to stimuli)    Negative: Difficult to awaken or acting confused (e.g., disoriented, slurred speech)    Negative: Seeing, hearing, or feeling things that are not there (i.e., visual, auditory, or tactile hallucinations)    Negative: Slow, shallow and weak breathing    Negative: Seizure    Negative: Violent behavior, or threatening to physically hurt or kill someone    Negative: Sounds like a life-threatening emergency to the triager    Negative: [1] Fever > 100.0 F (37.8 C) AND [2] IVDA (intravenous drug abuse)    Negative: Very strange, paranoid or confused behavior    Negative: Feeling very shaky (i.e., visible tremors of hands)    Protocols used: SUBSTANCE ABUSE AND [id number]

## 2022-04-05 ENCOUNTER — APPOINTMENT (OUTPATIENT)
Dept: ULTRASOUND IMAGING | Facility: CLINIC | Age: 32
End: 2022-04-05
Attending: EMERGENCY MEDICINE
Payer: COMMERCIAL

## 2022-04-05 ENCOUNTER — HOSPITAL ENCOUNTER (EMERGENCY)
Facility: CLINIC | Age: 32
Discharge: HOME | End: 2022-04-05
Attending: EMERGENCY MEDICINE | Admitting: EMERGENCY MEDICINE
Payer: COMMERCIAL

## 2022-04-05 VITALS
RESPIRATION RATE: 18 BRPM | HEART RATE: 88 BPM | OXYGEN SATURATION: 98 % | HEIGHT: 62 IN | WEIGHT: 230 LBS | BODY MASS INDEX: 42.33 KG/M2 | TEMPERATURE: 98.5 F | DIASTOLIC BLOOD PRESSURE: 83 MMHG | SYSTOLIC BLOOD PRESSURE: 140 MMHG

## 2022-04-05 DIAGNOSIS — O46.90 VAGINAL BLEEDING IN PREGNANCY: ICD-10-CM

## 2022-04-05 DIAGNOSIS — B96.89 BACTERIAL VAGINOSIS IN PREGNANCY: ICD-10-CM

## 2022-04-05 DIAGNOSIS — O23.599 BACTERIAL VAGINOSIS IN PREGNANCY: ICD-10-CM

## 2022-04-05 DIAGNOSIS — Z67.40 TYPE O BLOOD, RH POSITIVE: ICD-10-CM

## 2022-04-05 LAB
ABO/RH(D): NORMAL
ALBUMIN SERPL-MCNC: 3.2 G/DL (ref 3.5–5)
ALBUMIN UR-MCNC: NEGATIVE MG/DL
ALP SERPL-CCNC: 53 U/L (ref 45–120)
ALT SERPL W P-5'-P-CCNC: 9 U/L (ref 0–45)
ANION GAP SERPL CALCULATED.3IONS-SCNC: 8 MMOL/L (ref 5–18)
APPEARANCE UR: ABNORMAL
AST SERPL W P-5'-P-CCNC: 12 U/L (ref 0–40)
BILIRUB DIRECT SERPL-MCNC: <0.1 MG/DL
BILIRUB SERPL-MCNC: 0.2 MG/DL (ref 0–1)
BILIRUB UR QL STRIP: NEGATIVE
BUN SERPL-MCNC: 3 MG/DL (ref 8–22)
CALCIUM SERPL-MCNC: 9 MG/DL (ref 8.5–10.5)
CHLORIDE BLD-SCNC: 107 MMOL/L (ref 98–107)
CLUE CELLS: PRESENT
CO2 SERPL-SCNC: 23 MMOL/L (ref 22–31)
COLOR UR AUTO: ABNORMAL
CREAT SERPL-MCNC: 0.51 MG/DL (ref 0.6–1.1)
ERYTHROCYTE [DISTWIDTH] IN BLOOD BY AUTOMATED COUNT: 12.8 % (ref 10–15)
GFR SERPL CREATININE-BSD FRML MDRD: >90 ML/MIN/1.73M2
GLUCOSE BLD-MCNC: 86 MG/DL (ref 70–125)
GLUCOSE UR STRIP-MCNC: NEGATIVE MG/DL
HCG SERPL-ACNC: 7100 MLU/ML (ref 0–4)
HCT VFR BLD AUTO: 33.9 % (ref 35–47)
HGB BLD-MCNC: 11.5 G/DL (ref 11.7–15.7)
HGB UR QL STRIP: NEGATIVE
HOLD SPECIMEN: NORMAL
KETONES UR STRIP-MCNC: 20 MG/DL
LEUKOCYTE ESTERASE UR QL STRIP: NEGATIVE
LIPASE SERPL-CCNC: <9 U/L (ref 0–52)
MCH RBC QN AUTO: 29.6 PG (ref 26.5–33)
MCHC RBC AUTO-ENTMCNC: 33.9 G/DL (ref 31.5–36.5)
MCV RBC AUTO: 87 FL (ref 78–100)
MUCOUS THREADS #/AREA URNS LPF: PRESENT /LPF
NITRATE UR QL: NEGATIVE
PH UR STRIP: 6.5 [PH] (ref 5–7)
PLATELET # BLD AUTO: 410 10E3/UL (ref 150–450)
POTASSIUM BLD-SCNC: 3.8 MMOL/L (ref 3.5–5)
PROT SERPL-MCNC: 6.5 G/DL (ref 6–8)
RBC # BLD AUTO: 3.89 10E6/UL (ref 3.8–5.2)
RBC URINE: <1 /HPF
SODIUM SERPL-SCNC: 138 MMOL/L (ref 136–145)
SP GR UR STRIP: 1.01 (ref 1–1.03)
SPECIMEN EXPIRATION DATE: NORMAL
SQUAMOUS EPITHELIAL: 10 /HPF
TRICHOMONAS, WET PREP: ABNORMAL
UROBILINOGEN UR STRIP-MCNC: <2 MG/DL
WBC # BLD AUTO: 8.3 10E3/UL (ref 4–11)
WBC URINE: 5 /HPF
WBC'S/HIGH POWER FIELD, WET PREP: ABNORMAL
YEAST, WET PREP: ABNORMAL

## 2022-04-05 PROCEDURE — 85027 COMPLETE CBC AUTOMATED: CPT | Performed by: EMERGENCY MEDICINE

## 2022-04-05 PROCEDURE — 258N000003 HC RX IP 258 OP 636: Performed by: EMERGENCY MEDICINE

## 2022-04-05 PROCEDURE — 87491 CHLMYD TRACH DNA AMP PROBE: CPT | Performed by: EMERGENCY MEDICINE

## 2022-04-05 PROCEDURE — 87210 SMEAR WET MOUNT SALINE/INK: CPT | Performed by: EMERGENCY MEDICINE

## 2022-04-05 PROCEDURE — 81001 URINALYSIS AUTO W/SCOPE: CPT | Performed by: EMERGENCY MEDICINE

## 2022-04-05 PROCEDURE — 83690 ASSAY OF LIPASE: CPT | Performed by: EMERGENCY MEDICINE

## 2022-04-05 PROCEDURE — 99285 EMERGENCY DEPT VISIT HI MDM: CPT | Mod: 25

## 2022-04-05 PROCEDURE — 250N000011 HC RX IP 250 OP 636: Performed by: EMERGENCY MEDICINE

## 2022-04-05 PROCEDURE — 86901 BLOOD TYPING SEROLOGIC RH(D): CPT | Performed by: EMERGENCY MEDICINE

## 2022-04-05 PROCEDURE — 80053 COMPREHEN METABOLIC PANEL: CPT | Performed by: EMERGENCY MEDICINE

## 2022-04-05 PROCEDURE — 84702 CHORIONIC GONADOTROPIN TEST: CPT | Performed by: EMERGENCY MEDICINE

## 2022-04-05 PROCEDURE — 36415 COLL VENOUS BLD VENIPUNCTURE: CPT | Performed by: EMERGENCY MEDICINE

## 2022-04-05 PROCEDURE — 76815 OB US LIMITED FETUS(S): CPT

## 2022-04-05 PROCEDURE — 82248 BILIRUBIN DIRECT: CPT | Performed by: EMERGENCY MEDICINE

## 2022-04-05 PROCEDURE — 96374 THER/PROPH/DIAG INJ IV PUSH: CPT

## 2022-04-05 PROCEDURE — 96361 HYDRATE IV INFUSION ADD-ON: CPT

## 2022-04-05 RX ORDER — ONDANSETRON 2 MG/ML
4 INJECTION INTRAMUSCULAR; INTRAVENOUS ONCE
Status: COMPLETED | OUTPATIENT
Start: 2022-04-05 | End: 2022-04-05

## 2022-04-05 RX ORDER — METRONIDAZOLE 7.5 MG/G
1 GEL VAGINAL AT BEDTIME
Qty: 25 G | Refills: 0 | Status: SHIPPED | OUTPATIENT
Start: 2022-04-05 | End: 2022-04-10

## 2022-04-05 RX ADMIN — ONDANSETRON 4 MG: 2 INJECTION INTRAMUSCULAR; INTRAVENOUS at 17:31

## 2022-04-05 RX ADMIN — SODIUM CHLORIDE 1000 ML: 9 INJECTION, SOLUTION INTRAVENOUS at 17:14

## 2022-04-05 ASSESSMENT — ENCOUNTER SYMPTOMS
COUGH: 0
ABDOMINAL PAIN: 1
DYSURIA: 0
DIARRHEA: 0
FEVER: 0
SHORTNESS OF BREATH: 0
VOMITING: 1
NAUSEA: 1

## 2022-04-05 NOTE — DISCHARGE INSTRUCTIONS
Call your OB/GYN clinic first thing in the morning and let them know that you were here with vaginal bleeding.  They will need to follow you and repeat ultrasound to be sure that the baby continues to grow normally and heartbeat continues to be appropriate.    No sex, exercise, or other strenuous activities until cleared by your OB doctor.    One of your pelvic swabs did come back positive for bacterial vaginosis.  Use the metronidazole gel at bedtime for the next 5 days to help treat this.    Return to emergency department if you develop worsening vaginal bleeding, worsening abdominal pain, fever, or any other concerns.

## 2022-04-05 NOTE — ED TRIAGE NOTES
Patient states she is 17 weeks pregnant, she has had  Bright red  intermittent spotting since yesterday.  1st OB appt is on 19th.  Some mild intermittent cramping in pelvis.  No problems with this pregnancy.

## 2022-04-05 NOTE — ED PROVIDER NOTES
EMERGENCY DEPARTMENT ENCOUNTER      NAME: Ruth Goel  AGE: 32 year old female  YOB: 1990  MRN: [id number]  EVALUATION DATE & TIME: 2022  4:51 PM    PCP: David Garza    ED PROVIDER: Brittaney Dickey M.D.        Chief Complaint   Patient presents with     Pregnancy Complications         FINAL IMPRESSION:    1. Vaginal bleeding in pregnancy    2. Type O blood, Rh positive    3. Bacterial vaginosis in pregnancy            MEDICAL DECISION MAKIN year old female who reports that she is approximately 18 weeks pregnancy.  She has not had any prenatal care.  She is here with some vaginal spotting.  Pelvic exam completely unremarkable no signs of bleeding.  Ultrasound shows a heartbeat in the 120s.  This can be concerning in pregnancy feel this is just a tiny snapshot in time.  Pelvic swabs came back positive for back to vaginosis and will treat with vaginal Flagyl.  Ultimately patient encouraged to follow-up very closely with her own OB/GYN this week for reevaluation and repeat ultrasound to be sure heart rate is back up in the normal range.  Quant is only 7000 though this decreases normally does have a pregnancy anyways.  Urinalysis unremarkable.  Blood pressure mildly elevated but suspect this is more situational and do not get this represents true preeclampsia.  We will have this followed by her OB as well.          ED COURSE:  5:15 PM  I met with the patient to gather history and perform my exam. ED course and treatment discussed.    6:07 PM  Pelvic exam was done with RN assistance and chaperone.  Patient tolerated well.  No blood seen at the cervix or in the vaginal vault.  She does have a mild amount of discharge and therefore swabs have been collected.  Overall unremarkable pelvic exam.    6:33 PM  Swabs came back positive for clue cells for bacterial vaginosis.  We will treat her with vaginal Flagyl the next 5 days.  I do not feel that her blood pressure represents  preeclampsia.  I suspect this is more situational here in the ER.  I have encouraged her to follow-up very closely with her own OB/GYN this week.  She will call tomorrow and let them know what is going on.  She is otherwise stable.  She is aware that the fetal heart rate is a little slow today and this can be concerning though it is also just a short snapshot of fetal heart rate at this time.  She is encouraged to avoid any sex or exercise activities at this time.  She has been given IV fluids here in the ER.  She is Rh+.  Otherwise stable.  Patient understands the plan and agrees with the plan for discharge.  All of her questions have been answered.    I do not think that this represents ACS, PE, ruptured AAA, aortic dissection, bowel obstruction, bowel ischemia, cholecystitis, pancreatitis, appendicitis, diverticulitis, kidney stone, pyelonephritis, incarcerated or strangulated hernia, ovarian torsion, PID, ectopic pregnancy, tubo-ovarian abscess, viscus perforation, perforated GI ulcer, or other such etiologies at this time.    COVID-19 PPE worn during patient evaluation:  Mask: n95 and homemade masks   Eye Protection: goggles   Gown: none   Hair cover: yes  Face shield: none   Patient wearing a mask: yes     At the conclusion of the encounter I discussed the results of all of the tests and the disposition. Their questions were answered. The patient (and any family present) acknowledged understanding and were agreeable with the care plan.        CONSULTANTS:  none      MEDICATIONS GIVEN IN THE EMERGENCY:  Medications   0.9% sodium chloride BOLUS (0 mLs Intravenous Stopped 4/5/22 1833)   ondansetron (ZOFRAN) injection 4 mg (4 mg Intravenous Given 4/5/22 1731)         NEW PRESCRIPTIONS STARTED AT TODAY'S ER VISIT     Medication List      Started    metroNIDAZOLE 0.75 % vaginal gel  Commonly known as: METROGEL  5 g, Vaginal, AT BEDTIME, Pt is pregnant.                CONDITION:  stable        DISPOSITION:  discharge  home         =================================================================  =================================================================    HPI    Patient information was obtained from: patient    Use of Intrepreter: N/A     Ruth Goel is a 32 year old female with history of current pregnancy, approximately 18 weeks who presents to the ER with complaints of abdominal spotting and some cramping.  She states that she has not had any prenatal care this pregnancy has been having nausea and vomiting ongoing the entire pregnancy.  Starting yesterday she started having some spotting and some cramping.  Otherwise denies any fevers, cough, chest pain, shortness of breath, diarrhea, dysuria, hematuria.  She has her first OB appointment scheduled for April 19 at [facility].      REVIEW OF SYSTEMS  Review of Systems   Constitutional: Negative for fever.   Respiratory: Negative for cough and shortness of breath.    Cardiovascular: Negative for chest pain.   Gastrointestinal: Positive for abdominal pain, nausea and vomiting. Negative for diarrhea.   Genitourinary: Positive for vaginal bleeding. Negative for dysuria and vaginal discharge.   Allergic/Immunologic: Negative for immunocompromised state.   All other systems reviewed and are negative.        PAST MEDICAL HISTORY:  Past Medical History:   Diagnosis Date     Obesity          PAST SURGICAL HISTORY:  No past surgical history on file.      CURRENT MEDICATIONS:    Prior to Admission medications    Not on File         ALLERGIES:  No Known Allergies      FAMILY HISTORY:  No family history on file.      SOCIAL HISTORY:  Social History     Socioeconomic History     Marital status:      Spouse name: Not on file     Number of children: Not on file     Years of education: Not on file     Highest education level: Not on file   Occupational History     Not on file   Tobacco Use     Smoking status: Not on file     Smokeless tobacco: Not on file   Substance and  "Sexual Activity     Alcohol use: Not on file     Drug use: Not on file     Sexual activity: Not on file   Other Topics Concern     Not on file   Social History Narrative     Not on file     Social Determinants of Health     Financial Resource Strain: Not on file   Food Insecurity: Not on file   Transportation Needs: Not on file   Physical Activity: Not on file   Stress: Not on file   Social Connections: Not on file   Intimate Partner Violence: Not on file   Housing Stability: Not on file         VITALS:  Patient Vitals for the past 24 hrs:   BP Temp Temp src Pulse Resp SpO2 Height Weight   04/05/22 1713 (!) 140/83 -- -- 88 18 98 % -- --   04/05/22 1711 -- 98.5  F (36.9  C) Oral -- -- -- -- --   04/05/22 1346 (!) 150/75 (!) 96.6  F (35.9  C) Temporal 100 16 96 % 1.575 m (5' 2\") 104.3 kg (230 lb)       Wt Readings from Last 3 Encounters:   04/05/22 104.3 kg (230 lb)   01/31/22 104.3 kg (230 lb)         PHYSICAL EXAM    Constitutional:  Well developed, Well nourished, NAD, GCS 15  HENT:  Normocephalic, Atraumatic, Bilateral external ears normal, Nose normal. Neck-  Normal range of motion, No tenderness, Supple, No stridor.   Eyes:  PERRL, EOMI, Conjunctiva normal, No discharge.  Respiratory:  Normal breath sounds, No respiratory distress, No wheezing, Speaks full sentences easily. No cough.  Cardiovascular:  Normal heart rate, Regular rhythm, No murmurs, No rubs, No gallops. Chest wall nontender.  GI:  +obesity.  Bowel sounds normal, Soft, No tenderness, No masses, No flank tenderness. No rebound or guarding.   : Chaperone:  Female RN.  Normal external genitalia.  Small amount of white discharge noted in vaginal vault.  No blood seen in the cervix or vaginal vault.  No blood clots.  Musculoskeletal:  No cyanosis, No clubbing. Good range of motion in all major joints. No major deformities noted.   Integument:  Warm, Dry, No erythema, No rash.  No petechiae.   Neurologic:  Alert & oriented x 3,  No focal deficits " noted. Normal gait.  Psychiatric:  Affect normal, Cooperative         LAB:  All pertinent labs reviewed and interpreted.  Recent Results (from the past 24 hour(s))   CBC (+ platelets, no diff)    Collection Time: 04/05/22  3:57 PM   Result Value Ref Range    WBC Count 8.3 4.0 - 11.0 10e3/uL    RBC Count 3.89 3.80 - 5.20 10e6/uL    Hemoglobin 11.5 (L) 11.7 - 15.7 g/dL    Hematocrit 33.9 (L) 35.0 - 47.0 %    MCV 87 78 - 100 fL    MCH 29.6 26.5 - 33.0 pg    MCHC 33.9 31.5 - 36.5 g/dL    RDW 12.8 10.0 - 15.0 %    Platelet Count 410 150 - 450 10e3/uL   Basic metabolic panel    Collection Time: 04/05/22  3:57 PM   Result Value Ref Range    Sodium 138 136 - 145 mmol/L    Potassium 3.8 3.5 - 5.0 mmol/L    Chloride 107 98 - 107 mmol/L    Carbon Dioxide (CO2) 23 22 - 31 mmol/L    Anion Gap 8 5 - 18 mmol/L    Urea Nitrogen 3 (L) 8 - 22 mg/dL    Creatinine 0.51 (L) 0.60 - 1.10 mg/dL    Calcium 9.0 8.5 - 10.5 mg/dL    Glucose 86 70 - 125 mg/dL    GFR Estimate >90 >60 mL/min/1.73m2   ABO and Rh    Collection Time: 04/05/22  3:57 PM   Result Value Ref Range    ABO/RH(D) O POS     SPECIMEN EXPIRATION DATE [id number]    HCG quantitative pregnancy (blood)    Collection Time: 04/05/22  3:57 PM   Result Value Ref Range    hCG Quantitative 7,100 (H) 0 - 4 mlU/mL   Extra Red Top Tube    Collection Time: 04/05/22  3:57 PM   Result Value Ref Range    Hold Specimen JIC    Extra Green Top (Lithium Heparin) Tube    Collection Time: 04/05/22  3:57 PM   Result Value Ref Range    Hold Specimen JIC    Extra Purple Top Tube    Collection Time: 04/05/22  3:57 PM   Result Value Ref Range    Hold Specimen JIC    Hepatic function panel    Collection Time: 04/05/22  3:57 PM   Result Value Ref Range    Bilirubin Total 0.2 0.0 - 1.0 mg/dL    Bilirubin Direct <0.1 <=0.5 mg/dL    Protein Total 6.5 6.0 - 8.0 g/dL    Albumin 3.2 (L) 3.5 - 5.0 g/dL    Alkaline Phosphatase 53 45 - 120 U/L    AST 12 0 - 40 U/L    ALT 9 0 - 45 U/L   Lipase    Collection  Time: 04/05/22  3:57 PM   Result Value Ref Range    Lipase <9 0 - 52 U/L   UA with Microscopic reflex to Culture    Collection Time: 04/05/22  4:07 PM    Specimen: Urine, Midstream   Result Value Ref Range    Color Urine Light Yellow Colorless, Straw, Light Yellow, Yellow    Appearance Urine Turbid (A) Clear    Glucose Urine Negative Negative mg/dL    Bilirubin Urine Negative Negative    Ketones Urine 20  (A) Negative mg/dL    Specific Gravity Urine 1.014 1.001 - 1.030    Blood Urine Negative Negative    pH Urine 6.5 5.0 - 7.0    Protein Albumin Urine Negative Negative mg/dL    Urobilinogen Urine <2.0 <2.0 mg/dL    Nitrite Urine Negative Negative    Leukocyte Esterase Urine Negative Negative    Mucus Urine Present (A) None Seen /LPF    RBC Urine <1 <=2 /HPF    WBC Urine 5 <=5 /HPF    Squamous Epithelials Urine 10 (H) <=1 /HPF   Wet prep    Collection Time: 04/05/22  6:06 PM    Specimen: Vagina; Swab   Result Value Ref Range    Trichomonas Absent Absent    Yeast Absent Absent    Clue Cells Present (A) Absent    WBCs/high power field 1+ (A) None       Lab Results   Component Value Date    ABORH O POS 04/05/2022             RADIOLOGY:  Reviewed all pertinent imaging. Please see official radiology report.    US OB >14 Weeks Limited wo Fetal Measurement   Final Result   IMPRESSION:   1.  Single living intrauterine gestation.   2.  Posterior placenta with no previa and no subchorionic or retroplacental fluid collections.    3.  Probable 3.5 cm intramural fibroid anterior lower uterine segment.               EKG:    none    PROCEDURES:  None        Brittaney Dickey M.D. [facility]  Emergency Medicine and Medical Toxicology  Formerly [facility] EMERGENCY ROOM  [address] [address]  [phone]  Dept: [phone]           Brittaney Dickey MD  04/05/22 6323

## 2022-04-06 LAB
C TRACH DNA SPEC QL PROBE+SIG AMP: NEGATIVE
N GONORRHOEA DNA SPEC QL NAA+PROBE: NEGATIVE

## 2022-06-09 ENCOUNTER — PRENATAL OFFICE VISIT (OUTPATIENT)
Dept: NURSING | Facility: CLINIC | Age: 32
End: 2022-06-09
Payer: COMMERCIAL

## 2022-06-09 DIAGNOSIS — Z91.199 NO-SHOW FOR APPOINTMENT: Primary | ICD-10-CM

## 2022-06-09 RX ORDER — ASCORBIC ACID 500 MG
500 TABLET ORAL EVERY EVENING
COMMUNITY
Start: 2022-06-07

## 2022-06-09 RX ORDER — CALCIUM CARBONATE 500 MG/1
1-2 TABLET, CHEWABLE ORAL DAILY PRN
COMMUNITY
Start: 2022-06-07

## 2022-06-09 RX ORDER — ALBUTEROL SULFATE 90 UG/1
AEROSOL, METERED RESPIRATORY (INHALATION)
COMMUNITY
Start: 2022-06-07 | End: 2022-08-30

## 2022-06-09 RX ORDER — ASPIRIN 81 MG/1
TABLET, CHEWABLE ORAL
COMMUNITY
Start: 2022-06-07 | End: 2022-08-30

## 2022-06-09 RX ORDER — HYDROXYZINE PAMOATE 25 MG/1
25 CAPSULE ORAL 3 TIMES DAILY PRN
COMMUNITY
Start: 2022-06-07

## 2022-06-09 RX ORDER — VITAMIN A, VITAMIN C, VITAMIN D-3, VITAMIN E, VITAMIN B-1, VITAMIN B-2, NIACIN, VITAMIN B-6, CALCIUM, IRON, ZINC, COPPER 4000; 120; 400; 22; 1.84; 3; 20; 10; 1; 12; 200; 27; 25; 2 [IU]/1; MG/1; [IU]/1; MG/1; MG/1; MG/1; MG/1; MG/1; MG/1; UG/1; MG/1; MG/1; MG/1; MG/1
1 TABLET ORAL EVERY EVENING
COMMUNITY
Start: 2022-06-07

## 2022-06-09 RX ORDER — FERROUS SULFATE 325(65) MG
325 TABLET ORAL EVERY EVENING
COMMUNITY
Start: 2022-06-07

## 2022-06-09 RX ORDER — SENNOSIDES 8.6 MG/1
1 TABLET, COATED ORAL DAILY PRN
COMMUNITY
Start: 2022-06-07

## 2022-06-09 NOTE — PROGRESS NOTES
This patient was a no show for this scheduled appointment.    Phone number given is a non working number      Veda Treviño LPN

## 2022-06-13 ENCOUNTER — TRANSFERRED RECORDS (OUTPATIENT)
Dept: HEALTH INFORMATION MANAGEMENT | Facility: CLINIC | Age: 32
End: 2022-06-13

## 2022-06-13 ENCOUNTER — MEDICAL CORRESPONDENCE (OUTPATIENT)
Dept: HEALTH INFORMATION MANAGEMENT | Facility: CLINIC | Age: 32
End: 2022-06-13

## 2022-06-13 LAB — PHQ9 SCORE: 10

## 2022-06-14 ENCOUNTER — PRENATAL OFFICE VISIT (OUTPATIENT)
Dept: NURSING | Facility: CLINIC | Age: 32
End: 2022-06-14
Payer: COMMERCIAL

## 2022-06-14 VITALS — HEIGHT: 62 IN | BODY MASS INDEX: 42.07 KG/M2

## 2022-06-14 DIAGNOSIS — O09.93 ENCOUNTER FOR SUPERVISION OF HIGH RISK PREGNANCY IN THIRD TRIMESTER, ANTEPARTUM: Primary | ICD-10-CM

## 2022-06-14 PROCEDURE — 99207 PR NO CHARGE NURSE ONLY: CPT

## 2022-06-14 NOTE — PROGRESS NOTES
Important Information for Provider:     New ob transfer from [facility] in  [facility], needs to sign VIRAJ. Patient was inpatient treatment in [place] or [facility]   Patient just started outpatient treatment with lodging this last couple of weeks in [place]. Patient states she had a fetal survey done at 20 weeks at [facility] 9/07/2022( can not remember the date).   Patient has history of 3 C sections, third pregnancy she developed preeclampsia. Patient is taking 81 mg of Aspirn daily, today's blood pressure 120/60.  Denies any problems at this time. NOB with Dr Perez 6/23/2022, needs 1 hour GCT      Prenatal OB Questionnaire  Patient supplied answers from flow sheet for:  Prenatal OB Questionnaire.  Past Medical History  Have you ever received care for your mental health? : (!) Yes  Have you ever been in a major accident or suffered serious trauma?: No  Within the last year, has anyone hit, slapped, kicked or otherwise hurt you?: No  In the last year, has anyone forced you to have sex when you didn't want to?: No    Past Medical History 2   Have you ever received a blood transfusion?: No  Would you accept a blood transfusion if was medically recommended?: Yes  Does anyone in your home smoke?: No   Is your blood type Rh negative?: No  Have you ever ?: (!) Yes  Have you been hospitalized for a nonsurgical reason excluding normal delivery?: No  Have you ever had an abnormal pap smear?: (!) Yes    Past Medical History (Continued)  Do you have a history of abnormalities of the uterus?: No  Did your mother take ELOY or any other hormones when she was pregnant with you?: No  Do you have any other problems we have not asked about which you feel may be important to this pregnancy?: No                      Allergies as of 6/14/2022:    Allergies as of 06/14/2022     (No Known Allergies)       Current medications are:  Current Outpatient Medications   Medication Sig Dispense Refill     FEROSUL 325 (65 Fe) MG tablet         hydrOXYzine (VISTARIL) 25 MG capsule        Prenatal Vit-Fe Fumarate-FA (PRENATAL VITAMIN PLUS LOW IRON) 27-1 MG TABS        PROAIR  (90 Base) MCG/ACT inhaler        SENNA-TIME 8.6 MG tablet        SM ANTACID 500 MG chewable tablet        SM ASPIRIN LOW DOSE 81 MG chewable tablet        vitamin C (ASCORBIC ACID) 500 MG tablet            Early ultrasound screening tool:    Does patient have irregular periods?  No  Did patient use hormonal birth control in the three months prior to positive urine pregnancy test? No  Is the patient breastfeeding?  No  Is the patient 10 weeks or greater at time of education visit?  Yes

## 2022-06-23 ENCOUNTER — PRENATAL OFFICE VISIT (OUTPATIENT)
Dept: OBGYN | Facility: CLINIC | Age: 32
End: 2022-06-23
Payer: COMMERCIAL

## 2022-06-23 VITALS
HEART RATE: 78 BPM | SYSTOLIC BLOOD PRESSURE: 101 MMHG | WEIGHT: 272.7 LBS | DIASTOLIC BLOOD PRESSURE: 65 MMHG | BODY MASS INDEX: 49.88 KG/M2 | TEMPERATURE: 98 F

## 2022-06-23 DIAGNOSIS — F19.90 SUBSTANCE USE DISORDER: ICD-10-CM

## 2022-06-23 DIAGNOSIS — O09.629 ENCOUNTER FOR SUPERVISION OF HIGH RISK YOUNG MULTIGRAVIDA, ANTEPARTUM: Primary | ICD-10-CM

## 2022-06-23 LAB
ABO/RH(D): NORMAL
ALBUMIN UR-MCNC: NEGATIVE MG/DL
ANTIBODY SCREEN: NEGATIVE
APPEARANCE UR: CLEAR
BACTERIA #/AREA URNS HPF: ABNORMAL /HPF
BILIRUB UR QL STRIP: NEGATIVE
COLOR UR AUTO: YELLOW
ERYTHROCYTE [DISTWIDTH] IN BLOOD BY AUTOMATED COUNT: 13.1 % (ref 10–15)
FERRITIN SERPL-MCNC: 17 NG/ML (ref 12–150)
GLUCOSE 1H P 50 G GLC PO SERPL-MCNC: 95 MG/DL (ref 70–129)
GLUCOSE UR STRIP-MCNC: NEGATIVE MG/DL
HBV SURFACE AG SERPL QL IA: NONREACTIVE
HCT VFR BLD AUTO: 30.7 % (ref 35–47)
HGB BLD-MCNC: 10.5 G/DL (ref 11.7–15.7)
HGB BLD-MCNC: 10.6 G/DL (ref 11.7–15.7)
HGB UR QL STRIP: NEGATIVE
HIV 1+2 AB+HIV1 P24 AG SERPL QL IA: NONREACTIVE
HOLD SPECIMEN: NORMAL
IRON SATN MFR SERPL: 18 % (ref 15–46)
IRON SERPL-MCNC: 64 UG/DL (ref 35–180)
KETONES UR STRIP-MCNC: NEGATIVE MG/DL
LEUKOCYTE ESTERASE UR QL STRIP: NEGATIVE
MCH RBC QN AUTO: 30.2 PG (ref 26.5–33)
MCHC RBC AUTO-ENTMCNC: 34.2 G/DL (ref 31.5–36.5)
MCV RBC AUTO: 88 FL (ref 78–100)
MUCOUS THREADS #/AREA URNS LPF: PRESENT /LPF
NITRATE UR QL: NEGATIVE
PH UR STRIP: 6 [PH] (ref 5–7)
PLATELET # BLD AUTO: 431 10E3/UL (ref 150–450)
RBC # BLD AUTO: 3.48 10E6/UL (ref 3.8–5.2)
RBC #/AREA URNS AUTO: ABNORMAL /HPF
RUBV IGG SERPL QL IA: 2.13 INDEX
RUBV IGG SERPL QL IA: POSITIVE
SP GR UR STRIP: >=1.03 (ref 1–1.03)
SPECIMEN EXPIRATION DATE: NORMAL
SQUAMOUS #/AREA URNS AUTO: ABNORMAL /LPF
T PALLIDUM AB SER QL: NONREACTIVE
TIBC SERPL-MCNC: 365 UG/DL (ref 240–430)
UROBILINOGEN UR STRIP-ACNC: 1 E.U./DL
WBC # BLD AUTO: 7.7 10E3/UL (ref 4–11)
WBC #/AREA URNS AUTO: ABNORMAL /HPF

## 2022-06-23 PROCEDURE — 86900 BLOOD TYPING SEROLOGIC ABO: CPT | Performed by: OBSTETRICS & GYNECOLOGY

## 2022-06-23 PROCEDURE — 86901 BLOOD TYPING SEROLOGIC RH(D): CPT | Performed by: OBSTETRICS & GYNECOLOGY

## 2022-06-23 PROCEDURE — 86780 TREPONEMA PALLIDUM: CPT | Performed by: OBSTETRICS & GYNECOLOGY

## 2022-06-23 PROCEDURE — 81001 URINALYSIS AUTO W/SCOPE: CPT | Performed by: OBSTETRICS & GYNECOLOGY

## 2022-06-23 PROCEDURE — 99207 PR FIRST OB VISIT: CPT | Performed by: OBSTETRICS & GYNECOLOGY

## 2022-06-23 PROCEDURE — 83550 IRON BINDING TEST: CPT | Performed by: OBSTETRICS & GYNECOLOGY

## 2022-06-23 PROCEDURE — 87389 HIV-1 AG W/HIV-1&-2 AB AG IA: CPT | Performed by: OBSTETRICS & GYNECOLOGY

## 2022-06-23 PROCEDURE — 87491 CHLMYD TRACH DNA AMP PROBE: CPT | Performed by: OBSTETRICS & GYNECOLOGY

## 2022-06-23 PROCEDURE — 36415 COLL VENOUS BLD VENIPUNCTURE: CPT | Performed by: OBSTETRICS & GYNECOLOGY

## 2022-06-23 PROCEDURE — 82728 ASSAY OF FERRITIN: CPT | Performed by: OBSTETRICS & GYNECOLOGY

## 2022-06-23 PROCEDURE — 87340 HEPATITIS B SURFACE AG IA: CPT | Performed by: OBSTETRICS & GYNECOLOGY

## 2022-06-23 PROCEDURE — 87591 N.GONORRHOEAE DNA AMP PROB: CPT | Performed by: OBSTETRICS & GYNECOLOGY

## 2022-06-23 PROCEDURE — 86762 RUBELLA ANTIBODY: CPT | Performed by: OBSTETRICS & GYNECOLOGY

## 2022-06-23 PROCEDURE — 86850 RBC ANTIBODY SCREEN: CPT | Performed by: OBSTETRICS & GYNECOLOGY

## 2022-06-23 PROCEDURE — 85027 COMPLETE CBC AUTOMATED: CPT | Performed by: OBSTETRICS & GYNECOLOGY

## 2022-06-23 PROCEDURE — 82950 GLUCOSE TEST: CPT | Performed by: OBSTETRICS & GYNECOLOGY

## 2022-06-23 PROCEDURE — 87086 URINE CULTURE/COLONY COUNT: CPT | Performed by: OBSTETRICS & GYNECOLOGY

## 2022-06-23 ASSESSMENT — PATIENT HEALTH QUESTIONNAIRE - PHQ9: SUM OF ALL RESPONSES TO PHQ QUESTIONS 1-9: 10

## 2022-06-23 NOTE — PROGRESS NOTES
OB - New OB History and Physical    HPI: Ruth Goel is a 32 year old  at 28w2d as dated by stated EDC.   Estimated Date of Delivery: Sep 13, 2022. She has been getting prenatal care through [facility] in [facility] and records have not yet been received but she reports uncomplicated care thus far. She does have a history of substance abuse disorder and reports using fentanyl illicitly. She has been in an intensive outpatient treatment facility on Methadone since 3/2022 and reports that this has been going well.     Today she reports +FM, no VB, LOF or contractions.       Obstetric history:     OB History    Para Term  AB Living   4 3 3 0 0 3   SAB IAB Ectopic Multiple Live Births   0 0 0 0 3      # Outcome Date GA Lbr Jos/2nd Weight Sex Delivery Anes PTL Lv   4 Current            3 Term 10/03/21 40w0d  3.175 kg (7 lb) M -SEC  N MEME   2 Term 18 39w0d  3.629 kg (8 lb) M -SEC  N MEME      Complications: Preeclampsia/Hypertension   1 Term 12/16/10 39w0d  3.629 kg (8 lb) F -SEC  N MEME     Patient has a history of pre-eclampsia, denies history of GDM,  labor or PPH     Gynecologic History:   Patient's last menstrual period was 2021.   STI history: denies, except for HPV   Last Pap: reports was abnormal in 2022 but has not had any cervical procedures     Allergy: Patient has no known allergies.  Patient denies food, latex or environmental allergies.    Current Medications:  Current Outpatient Medications   Medication     FEROSUL 325 (65 Fe) MG tablet     hydrOXYzine (VISTARIL) 25 MG capsule     Prenatal Vit-Fe Fumarate-FA (PRENATAL VITAMIN PLUS LOW IRON) 27-1 MG TABS     PROAIR  (90 Base) MCG/ACT inhaler     SENNA-TIME 8.6 MG tablet     SM ANTACID 500 MG chewable tablet     SM ASPIRIN LOW DOSE 81 MG chewable tablet     vitamin C (ASCORBIC ACID) 500 MG tablet     No current facility-administered medications for this visit.       Past Medical  History:  Past Medical History:   Diagnosis Date     Obesity      Varicella        Past Surgical History:  Past Surgical History:   Procedure Laterality Date      SECTION      3 Csections       Social History:  Patient lives in the housing provided by the intensive outpatient facility.  lives at home with their three children.   She is currently smoking, denies alcohol or drug use       Family History:  Family History   Problem Relation Age of Onset     Hypertension Mother      Hypertension Father        Review of Systems  See HPI       Physical Exam:  Vitals:    22 1034   BP: 101/65   Pulse: 78   Temp: 98  F (36.7  C)   TempSrc: Oral   Weight: 123.7 kg (272 lb 11.2 oz)     Body mass index is 49.88 kg/m .    General: Patient alert and oriented, no acute distress  CV: no peripheral edema or cyanosis  Resp: normal respiratory effort and equal lung expansion  Abdomen:Gravid, NT. S>D based on FH   : deferred   Ext: non-tender, no edema        Assessment:  Ruth Goel is a 32 year old  at 28w2d presenting to establish prenatal care.    Problem List:   Substance use disorder-[place] [facility] [place] [facility] [place]   Obesity-will follow with serial growths and BPPs   Late transfer of care-patient to sign VIRAJ today     Plan:  1. GTT completed, Tdap given in 2022. Prenatal labs drawn   2. Reviewed routine prenatal care. Discussed MD call schedule as well as role of residents and med students both in clinic and hospital.  She is okay with resident care  3. Pap: done per patient at new OB visit, will obtain records    4. Diet, Nutrition and Exercise:  Continue PNVs. Continue normal exercise. Her prepregnancy BMI is 40.  According to the WHO guidelines, patient is given a goal of gaining approximately 11-20  pounds during the course of her pregnancy.    5. Immunizations: Tdap completed, declines COVID   6. Fetal anomaly screening: uncertain if this was done previously in the pregnancy   Routine  Prenatal Care: the patient will return to clinic in 2 weeks or sooner WANDER Perez MD

## 2022-06-24 LAB
C TRACH DNA SPEC QL NAA+PROBE: NEGATIVE
N GONORRHOEA DNA SPEC QL NAA+PROBE: NEGATIVE

## 2022-06-25 LAB — BACTERIA UR CULT: NORMAL

## 2022-06-27 ENCOUNTER — TELEPHONE (OUTPATIENT)
Dept: OBGYN | Facility: CLINIC | Age: 32
End: 2022-06-27

## 2022-06-27 ENCOUNTER — PRE VISIT (OUTPATIENT)
Dept: MATERNAL FETAL MEDICINE | Facility: CLINIC | Age: 32
End: 2022-06-27

## 2022-06-27 ENCOUNTER — TRANSCRIBE ORDERS (OUTPATIENT)
Dept: MATERNAL FETAL MEDICINE | Facility: CLINIC | Age: 32
End: 2022-06-27

## 2022-06-27 DIAGNOSIS — O26.90 PREGNANCY RELATED CONDITION, ANTEPARTUM: Primary | ICD-10-CM

## 2022-06-27 NOTE — TELEPHONE ENCOUNTER
NOLAN from [facility], discussed prenatal labs. She is already taking an iron supplement.   Angie Contreras RN

## 2022-06-27 NOTE — TELEPHONE ENCOUNTER
----- Message from Violette Perez MD sent at 6/24/2022  6:28 PM CDT -----  Please review the normal prenatal labs and GTT with Ruth but let her know that her blood count is low and we would recommend a daily iron supplement.     Thank you!  Violette Perez MD

## 2022-06-28 ENCOUNTER — OFFICE VISIT (OUTPATIENT)
Dept: MATERNAL FETAL MEDICINE | Facility: CLINIC | Age: 32
End: 2022-06-28
Attending: OBSTETRICS & GYNECOLOGY
Payer: COMMERCIAL

## 2022-06-28 ENCOUNTER — HOSPITAL ENCOUNTER (OUTPATIENT)
Dept: ULTRASOUND IMAGING | Facility: CLINIC | Age: 32
Discharge: HOME | End: 2022-06-28
Attending: OBSTETRICS & GYNECOLOGY
Payer: COMMERCIAL

## 2022-06-28 DIAGNOSIS — O99.210 OBESITY IN PREGNANCY, ANTEPARTUM: Primary | ICD-10-CM

## 2022-06-28 DIAGNOSIS — O26.90 PREGNANCY RELATED CONDITION, ANTEPARTUM: ICD-10-CM

## 2022-06-28 PROCEDURE — 76811 OB US DETAILED SNGL FETUS: CPT

## 2022-06-28 PROCEDURE — 76811 OB US DETAILED SNGL FETUS: CPT | Mod: 26 | Performed by: OBSTETRICS & GYNECOLOGY

## 2022-06-28 NOTE — PROGRESS NOTES
Please see the imaging tab for details of the ultrasound performed today.    Danya Arellano MD  Specialist in Maternal-Fetal Medicine

## 2022-06-30 ENCOUNTER — TELEPHONE (OUTPATIENT)
Dept: MATERNAL FETAL MEDICINE | Facility: CLINIC | Age: 32
End: 2022-06-30

## 2022-06-30 NOTE — TELEPHONE ENCOUNTER
Writer called and left message for Ruth to call back to PCc line as pt is intensive outpt [facility] records and LYNNETTE COUGHLIN is requesting records from [facility]. Velma Thakkar RN

## 2022-07-01 ENCOUNTER — TRANSCRIBE ORDERS (OUTPATIENT)
Dept: MATERNAL FETAL MEDICINE | Facility: CLINIC | Age: 32
End: 2022-07-01

## 2022-07-01 DIAGNOSIS — O26.90 PREGNANCY RELATED CONDITION, ANTEPARTUM: Primary | ICD-10-CM

## 2022-07-12 ENCOUNTER — PRENATAL OFFICE VISIT (OUTPATIENT)
Dept: OBGYN | Facility: CLINIC | Age: 32
End: 2022-07-12
Payer: COMMERCIAL

## 2022-07-12 VITALS
WEIGHT: 275 LBS | DIASTOLIC BLOOD PRESSURE: 66 MMHG | SYSTOLIC BLOOD PRESSURE: 103 MMHG | OXYGEN SATURATION: 96 % | HEART RATE: 83 BPM | BODY MASS INDEX: 50.3 KG/M2

## 2022-07-12 DIAGNOSIS — F19.90 SUBSTANCE USE DISORDER: ICD-10-CM

## 2022-07-12 DIAGNOSIS — O09.629 ENCOUNTER FOR SUPERVISION OF HIGH RISK YOUNG MULTIGRAVIDA, ANTEPARTUM: Primary | ICD-10-CM

## 2022-07-12 DIAGNOSIS — E66.01 CLASS 3 SEVERE OBESITY WITH BODY MASS INDEX (BMI) OF 50.0 TO 59.9 IN ADULT, UNSPECIFIED OBESITY TYPE, UNSPECIFIED WHETHER SERIOUS COMORBIDITY PRESENT (H): ICD-10-CM

## 2022-07-12 DIAGNOSIS — E66.813 CLASS 3 SEVERE OBESITY WITH BODY MASS INDEX (BMI) OF 50.0 TO 59.9 IN ADULT, UNSPECIFIED OBESITY TYPE, UNSPECIFIED WHETHER SERIOUS COMORBIDITY PRESENT (H): ICD-10-CM

## 2022-07-12 PROCEDURE — 99207 PR PRENATAL VISIT: CPT | Performed by: OBSTETRICS & GYNECOLOGY

## 2022-07-12 NOTE — PROGRESS NOTES
31w0d  Doing well since last visit.  On methadone 130mg daily.  Taking it all at once, although apparently has discussed split dosing previously.  Does reporting struggling with cravings during the middle of the night, so suggested she talk with her prescriber re split dosing.  Concerned about EUGENIA, baby suffering, feelings of guilt, etc. talked to her about this and some basic expectations.  Also discussed how maternal dose has not correlated with likelihood of withdrawal or severity of withdrawal.  Time course is unpredictable as well.  If possible, I think she would benefit from more information about expectations.  She was not on methadone or using in previous pregnancies, so this is a new experience for her.  Spoke with [facility] and they gave me a personto reach out to via email.  Email sent to [facility] Ashley Breaux in NICU re possible EUGENIA consult.  Discussed option of a tubal ligation at the time of her repeat .  She is not committed to this yet.  Offered that we could sign the federal tubal paper as it is a requirement before this procedure can be performed, but she does not want to do so today.  I did let her know that she would not be held to having a tubal ligation if she signed this form.  Let her know that her next visit is likely the last opportunity to have that as an option.  Normal GCT at last visit.  RTC 2w.  eVlma Wheeler MD

## 2022-07-16 NOTE — PROGRESS NOTES
[facility]  Maternal Fetal Medicine Consult Note    RE: Ruth Goel  : 1990  MRUN: [id number]    2022    Dear Dr. Perez,    Thank you for referring your patient Ruth Goel for a Maternal-Fetal Medicine consultation today.  As you know, she is a 32 year old  at 32 weeks and 2 days gestation with an Estimated Date of Delivery: Sep 13, 2022 by 17w0d US.    She comes to [facility] clinic today to discuss recommendations as she has a history of substance use disorder currently managed on methadone, obesity and 3 prior C sections.     She was a late transfer of care from [place] in [facility] at 28 weeks.     In terms of her substance use disorder, she was recently brought to [place] ED on 2022 and held due to concern for substance use. During this ED visit, she stated she was planning to travel to [place], [place] for an . She subsequently was transferred to a treatment facility on 6/3/2022. She has been in an intensive outpatient treatment program on methadone since 2022. She receives 130 mg of methadone daily.     Today, she feels well.  She denies contractions, leakage of fluid and vaginal bleeding.  She reports good fetal movement.     Her questions today are in regards to her delivery management and how baby may be affected by the methadone.    Obstetrical History:    OB History    Para Term  AB Living   5 3 3 0 0 3   SAB IAB Ectopic Multiple Live Births   0 0 0 0 3      # Outcome Date GA Lbr Jos/2nd Weight Sex Delivery Anes PTL Lv   5 Current            4 Term 18 39w0d  3.629 kg (8 lb) M -SEC  N MEME      Complications: Preeclampsia/Hypertension   3 Term 10/03/12   3.771 kg (8 lb 5 oz) M CS-LTranv Spinal N MEME      Name: Boni   2 Term 12/16/10   3.884 kg (8 lb 9 oz) F CS-LTranv Spinal  MEME      Name: Kiara   1                 Medical History:   Past Medical History:   Diagnosis Date     Anxiety      Chickenpox      Depression      Mild  intermittent asthma without complication      Obesity      PTSD (post-traumatic stress disorder)      Uncomplicated asthma     Well controlled     Varicella        Surgical History:   Past Surgical History:   Procedure Laterality Date     AS REMOVE TONSILS/ADENOIDS,<13 Y/O        SECTION  2010      SECTION  10/03/2012      SECTION      3 Csections     LAPAROSCOPIC CHOLECYSTECTOMY  2014     LAPAROSCOPIC CHOLECYSTECTOMY  2016     TONSILLECTOMY & ADENOIDECTOMY Bilateral        Medications:     Current Outpatient Medications:      albuterol (PROAIR HFA/PROVENTIL HFA/VENTOLIN HFA) 108 (90 Base) MCG/ACT Inhaler, Inhale 2 puffs into the lungs as needed, Disp: , Rfl:      buPROPion (WELLBUTRIN XL) 150 MG 24 hr tablet, TK 1 T PO D, Disp: , Rfl:      escitalopram (LEXAPRO) 10 MG tablet, , Disp: , Rfl:      FEROSUL 325 (65 Fe) MG tablet, , Disp: , Rfl:      hydrOXYzine (VISTARIL) 25 MG capsule, , Disp: , Rfl:      naltrexone (DEPADE/REVIA) 50 MG tablet, Take 1/2 tablet once daily 1-2 hours prior to worst cravings for 1 week, then increase to 1 tablet daily as directed if tolerating, Disp: 30 tablet, Rfl: 3     Prenatal Vit-Fe Fumarate-FA (PRENATAL VITAMIN PLUS LOW IRON) 27-1 MG TABS, , Disp: , Rfl:      PROAIR  (90 Base) MCG/ACT inhaler, , Disp: , Rfl:      SENNA-TIME 8.6 MG tablet, , Disp: , Rfl:      SM ANTACID 500 MG chewable tablet, , Disp: , Rfl:      SM ASPIRIN LOW DOSE 81 MG chewable tablet, , Disp: , Rfl:      traZODone (DESYREL) 50 MG tablet, TK 1 TO 2 TS PO HS PRF SLEEP, Disp: , Rfl:      vitamin C (ASCORBIC ACID) 500 MG tablet, , Disp: , Rfl:      Allergies:   Allergies   Allergen Reactions     Codeine Anaphylaxis     Tramadol Other (See Comments)       Social History:  - She  reports that she has been smoking. She has never used smokeless tobacco. She reports previous alcohol use. She reports previous drug use. Drug: Methamphetamines.    Family History:   Family History    Problem Relation Age of Onset     Hypertension Mother      Hypertension Father      Ovarian Cancer Other      Breast Cancer No family hx of      Alcoholism Maternal Grandfather      Cirrhosis Maternal Grandfather      Substance Abuse Paternal Grandfather         drugs       Review of Systems:  10 point review of systems negative except as noted in the HPI    Data Reviewed:    - Current - LMP 12/01/2021  Weight change: 24.9 kg (55 lb)   - Pre-pregnancy - Weight: 99.8 kg (220 lb); BMI:40.23 kg/m2  Lab results:  - June 23, 2022   - ABO Group: O, Rh type: positive, antibody screen: negative  - Hemoglobin 10.5 mg/dL, hematocrit 30.7%, MCV 88 fL, platelets 431 thou/ L  - Ferritin 17 ng/mL, Fe 64 ug/dL,  ug/dL, Fe sat index 18%  - Syphillis/HepBsAg/HIV: Nonreactive  - Rubella IgG: Immune  - Gonorrhea/Chlamydia: Negative  - Urine culture:  - Glucose tolerance test: 95 mg/dL  - Aneuploidy screening:  - MSAFP:  - Cystic fibrosis screen:   - June 6, 2022   - TSH: 2.59 mIU/mL    - Transaminases: AST 9, ALT 8 U/L   - Albumin 3.0 g/dL   - Creatinine: 0.52 mg/dL  - June 1, 2022    - UDS positive for THC and methadone  - April 48, 2022   - Vitamin D Total 19 ng/mL   - HbA1c 5.1%   - Hepatitis C antibody: Negative   - Pap smear: NILM, HPV positive   - Pro/Cr: < 0.15  Imaging:  - May 12, 2022   - Normal anatomy scan of blake, male fetus. Cervical length 3.1 cm. EFW 78.2%. Posterior placenta.  - April 28, 2022   - Fundal placenta. Cervical length 4.06 cm. Thin lower uterine segment measuring 3 mm. Normal fluid.    Ultrasound:  [facility] US Comprehensive 7/21/2022:    IMPRESSION  ---------------------------------------------------------------------------------------------------------  1) Growth parameters and estimated fetal weight were consistent with appropriate for gestational age pattern of growth.  2) Some of the cardiac anatomy was again not visualized.  3) The remainder of the anatomy appeared normal or was previously  visualized.  Please see imaging tab/separate report for ultrasound performed at [facility] today.    Physical examination was deferred at this time.    In light of the patient s history as listed above our recommendations can be summarized briefly as follows:    # Polysubstance use disorder  # Methadone therapy  - Currently taking 130 mg of methadone therapy daily. She has been stable on this dose.  Substance use in pregnancy is associated with multiple complications of pregnancy and cessation should be strongly encouraged.  Methadone maintenance is the treatment of choice for opioid-dependent pregnant women, and weaning of methadone dose is not usually recommended in pregnancy. Maintenance of a stable circulating opioid dose is important in reduction of fetal stress and /or fetal demise, and overall promotes maternal health.  These medications reduce the risk of illicit opioid use/relapse with its associated complications and improve OB outcomes.  Dose requirements may increase during the third trimester due to larger plasma volume, reduced protein binding, increased tissue binding, and increased metabolism.    The patient understands that both methadone and buprenorphine use in pregnancy can result in  abstinence syndrome (EUGENIA), which may require prolonged admission to the  intensive care unit for monitoring of withdrawal symptoms.  There is an approximate 35-70% risk of EUGENIA which is independent of maternal treatment dose.  EUGENIA can manifest in symptoms involving central nervous (tremors, irritability, hyperactivity), gastrointestinal (poor feeding, diarrhea, dehydration), and autonomic (increased sweating, fever, tachypnea) systems.  Patients should be offered a NICU/pediatrics consultation prior to delivery, to discuss EUGENIA screening and treatment. She is part of the [employer] program and this was discussed with her today.    Tobacco use can further complicate pregnancy complications with OUD,  including increased the severity of EUGENIA, and increasing the risk of IUGR. Improved growth of the fetus is seen in women who are able to decrease tobacco use to 1/2 pack per day or less. The patient currently smokes less than a half pack per day.     Delivery is for obstetric indications, with no early delivery recommended for stable OUD on maintenance therapy. During labor and delivery and immediately postpartum, the patient should be continued on her antepartum treatment dose and schedule.  With  delivlery, higher doses of opioids may be required to provide adequate pain control in the immediate post operative period, but this can be safety administered in addition to her opioid replacement treatment.  Multi-modal pain therapies should be used to maximize use of non-opioid methods.  Relapse risk increases in the post partum period and close surveillance is recommended to assess for signs/symptoms of relapse.     # Obesity, BMI 50   Pregnancy complications are increased in obese women, even in the absence of other overt comorbid condition. These risks include, but are not limited to, gestational diabetes, gestational hypertension, preeclampsia,  delivery, miscarriage, congenital anomaly, and stillbirth.  The risk of stillbirth increases with increasing BMI and gestational age. For these reasons, determining the optimal timing of delivery for obese women becomes a balance of decreasing preventable stillbirth risk.      Venous thromboembolism in pregnancy is responsible for up to 9-10% of maternal deaths in developed countries, with many of these deaths (>90%) having an element of preventability. Obesity is certainly a risk factor for DVT throughout pregnancy, increasing risk between 2.5-3 times. Throughout pregnancy, risk for VTE is highest during times of immobility, such as surgery or trauma, and antepartum admission.  Risk is highest after delivery, and remains high in the 6 weeks postpartum.  Upon  admission to the hospital during pregnancy for non-delivery indications, women should undergo evaluation for risk VTE within 24 hours. Antepartum admission during pregnancy confers an 18 fold risk of VTE over baseline in otherwise low risk women, which approaches the risk of women with a high risk thrombophilia or prior VTE.  Due to this increased risk, VTE prophylaxis is recommended.  For women who are obese, and for whom admission of at least 72 hours is anticipated, mechanical prophylaxis while in bed along with maintenance of mobility is encouraged.  Pharmacologic prophylaxis is also recommended in this population.      Extremely high BMI (BMI >50) affects stillbirth differently compared with lower BMI. The rate at which risk for stillbirth increased for overweight and Class I and II obese women are linear from 30-42 weeks  gestation.  In contrast, among class III obese women and those with BMI >50, the risks appear to be nonlinear with time; the risk of stillbirth escalates faster with increasing gestational age in these extreme classes. These findings remained consistent after adjustment for known confounding risk factors such as chronic hypertension and pregestational diabetes.    # Three previous  sections  - Patient plans for repeat CS as mode of delivery and previously discussed possibility of BTL at the same time, but is unsure of this yet. This was not discussed further today.    Recommendations:  In regards to REINA:    Continuation of methadone.  Weaning is not recommended during pregnancy, but can be attempted postpartum if patient desires.    Ultrasound for fetal growth in 4 weeks.    Continued screening for current substance use (i.e. tobacco, alcohol)    Avoidance of opioid antagonists during intrapartum or postpartum periods (e.g. naloxone prescribed for itching).     Anesthesia consult.    Notification of  team at time of delivery.    Social work involvement as deemed appropriate.   "    Breast feeding is encouraged in women compliant with a methadone maintenance program, regardless of the dose, and may have positive effects such as improved maternal-infant bonding and mitigation of the effects of  abstinence syndrome.    In regards to elevated BMI:    Pharmacologic anticoagulation if admitted antepartum for an expected stay of 72 hours or more.    Anticoagulation within 24 hours of  delivery for the course of hospitalization. Recommended dosing is Lovenox 40 mg subcutaneous every 12 hours given BMI of 40 or greater.     surveillance with weekly BPP beginning at 34 weeks gestation.    Delivery is recommended at 39 weeks gestation.    Continue aspirin 81 mg daily to reduce the risk for preeclampsia.    At the end of our discussion, Ruth Goel indicated that her questions were answered and she seemed satisfied with our discussion. Thank you for the opportunity to participate in your patient s care. If we can be of any further assistance, please do not hesitate to contact the clinic.    This patient was seen and discussed with Dr. Riddle.    Sincerely,    Tin Nguyen MD, MPH  Ob/Gyn Resident, PGY-2  22 9:04     Physician Attestation   I, Filipe Riddle MD, saw this patient and agree with the findings and plan of care as documented in the note.      Items personally reviewed/procedural attestation: History, ultrasound review and plan of care. Total time spent in face-to-face counseling was 30 minutes (>50% for medical management discussion  And plan of care). A copy of this consult was sent to your office.     Please see \"Imaging\" tab under \"Chart Review\" for [facility].    Filipe Riddle MD  Maternal-Fetal Medicine        Filipe Riddle MD    "

## 2022-07-21 ENCOUNTER — HOSPITAL ENCOUNTER (OUTPATIENT)
Dept: ULTRASOUND IMAGING | Facility: CLINIC | Age: 32
Discharge: HOME | End: 2022-07-21
Attending: OBSTETRICS & GYNECOLOGY
Payer: COMMERCIAL

## 2022-07-21 ENCOUNTER — OFFICE VISIT (OUTPATIENT)
Dept: MATERNAL FETAL MEDICINE | Facility: CLINIC | Age: 32
End: 2022-07-21
Attending: OBSTETRICS & GYNECOLOGY
Payer: COMMERCIAL

## 2022-07-21 DIAGNOSIS — O99.323 METHADONE MAINTENANCE TREATMENT AFFECTING PREGNANCY IN THIRD TRIMESTER (H): Primary | ICD-10-CM

## 2022-07-21 DIAGNOSIS — F11.20 METHADONE MAINTENANCE TREATMENT AFFECTING PREGNANCY IN THIRD TRIMESTER (H): Primary | ICD-10-CM

## 2022-07-21 DIAGNOSIS — O99.210 OBESITY IN PREGNANCY, ANTEPARTUM: ICD-10-CM

## 2022-07-21 DIAGNOSIS — O26.90 PREGNANCY RELATED CONDITION, ANTEPARTUM: ICD-10-CM

## 2022-07-21 PROCEDURE — 76816 OB US FOLLOW-UP PER FETUS: CPT | Mod: 26 | Performed by: OBSTETRICS & GYNECOLOGY

## 2022-07-21 PROCEDURE — 99214 OFFICE O/P EST MOD 30 MIN: CPT | Mod: 25 | Performed by: OBSTETRICS & GYNECOLOGY

## 2022-07-21 PROCEDURE — 76816 OB US FOLLOW-UP PER FETUS: CPT

## 2022-07-21 NOTE — NURSING NOTE
Ruth accompanied by her mother Soni, seen in clinic today for follow up growth US and MFM consult at 32w2d gestation due to pregnancy c/b methadone maintenance therapy (see report/notes). Pt reports + FM. Pt denies bldg/lof/change in discharge/contractions. Dr. Nguyen and Dr. Riddle met with pt and discussed POC. Plan to continue serial growth q 4 weeks, begin weekly BPP at 34 weeks. Pt discharged stable and ambulatory.     Velma Christensen RN

## 2022-07-27 PROBLEM — Z36.89 ENCOUNTER FOR TRIAGE IN PREGNANT PATIENT: Status: RESOLVED | Noted: 2018-05-14 | Resolved: 2022-07-27

## 2022-07-27 PROBLEM — Z34.80 PRENATAL CARE, SUBSEQUENT PREGNANCY: Status: RESOLVED | Noted: 2018-06-07 | Resolved: 2022-07-27

## 2022-08-09 ENCOUNTER — OFFICE VISIT (OUTPATIENT)
Dept: MATERNAL FETAL MEDICINE | Facility: CLINIC | Age: 32
End: 2022-08-09
Attending: OBSTETRICS & GYNECOLOGY
Payer: COMMERCIAL

## 2022-08-09 ENCOUNTER — HOSPITAL ENCOUNTER (OUTPATIENT)
Dept: ULTRASOUND IMAGING | Facility: CLINIC | Age: 32
Discharge: HOME | End: 2022-08-09
Attending: OBSTETRICS & GYNECOLOGY
Payer: COMMERCIAL

## 2022-08-09 ENCOUNTER — PRENATAL OFFICE VISIT (OUTPATIENT)
Dept: OBGYN | Facility: CLINIC | Age: 32
End: 2022-08-09
Payer: COMMERCIAL

## 2022-08-09 VITALS
BODY MASS INDEX: 50.22 KG/M2 | SYSTOLIC BLOOD PRESSURE: 107 MMHG | HEART RATE: 70 BPM | DIASTOLIC BLOOD PRESSURE: 69 MMHG | OXYGEN SATURATION: 98 % | WEIGHT: 274.6 LBS

## 2022-08-09 DIAGNOSIS — O99.210 OBESITY IN PREGNANCY, ANTEPARTUM: ICD-10-CM

## 2022-08-09 DIAGNOSIS — Z98.891 HISTORY OF CESAREAN DELIVERY: Primary | ICD-10-CM

## 2022-08-09 DIAGNOSIS — O99.210 OBESITY IN PREGNANCY, ANTEPARTUM: Primary | ICD-10-CM

## 2022-08-09 PROCEDURE — 76818 FETAL BIOPHYS PROFILE W/NST: CPT | Mod: 26 | Performed by: OBSTETRICS & GYNECOLOGY

## 2022-08-09 PROCEDURE — 99207 PR PRENATAL VISIT: CPT | Performed by: OBSTETRICS & GYNECOLOGY

## 2022-08-09 PROCEDURE — 76818 FETAL BIOPHYS PROFILE W/NST: CPT

## 2022-08-09 NOTE — PROGRESS NOTES
The patient was seen for an ultrasound in the Maternal-Fetal Medicine Center at the [facility] today.  For a detailed report of the ultrasound examination, please see the ultrasound report which can be found under the imaging tab.    Farzaneh Gale MD  , OB/GYN  Maternal-Fetal Medicine  [phone] (Pager)

## 2022-08-10 ENCOUNTER — TELEPHONE (OUTPATIENT)
Dept: OBGYN | Facility: CLINIC | Age: 32
End: 2022-08-10

## 2022-08-10 NOTE — TELEPHONE ENCOUNTER
LVMTCB to discuss  scheduling.     Attempted preferred mobile number twice, unable to complete call so LVM on home number.       Lay  She/her/hers  [facility] Scheduler

## 2022-08-16 ENCOUNTER — PRENATAL OFFICE VISIT (OUTPATIENT)
Dept: OBGYN | Facility: CLINIC | Age: 32
End: 2022-08-16
Payer: COMMERCIAL

## 2022-08-16 ENCOUNTER — HOSPITAL ENCOUNTER (OUTPATIENT)
Dept: ULTRASOUND IMAGING | Facility: CLINIC | Age: 32
Discharge: HOME | End: 2022-08-16
Attending: OBSTETRICS & GYNECOLOGY
Payer: COMMERCIAL

## 2022-08-16 ENCOUNTER — OFFICE VISIT (OUTPATIENT)
Dept: MATERNAL FETAL MEDICINE | Facility: CLINIC | Age: 32
End: 2022-08-16
Attending: OBSTETRICS & GYNECOLOGY
Payer: COMMERCIAL

## 2022-08-16 VITALS
BODY MASS INDEX: 50.77 KG/M2 | WEIGHT: 275.9 LBS | HEIGHT: 62 IN | HEART RATE: 75 BPM | DIASTOLIC BLOOD PRESSURE: 69 MMHG | SYSTOLIC BLOOD PRESSURE: 110 MMHG | OXYGEN SATURATION: 96 %

## 2022-08-16 DIAGNOSIS — O99.210 OBESITY IN PREGNANCY, ANTEPARTUM: ICD-10-CM

## 2022-08-16 DIAGNOSIS — O99.323 METHADONE MAINTENANCE TREATMENT AFFECTING PREGNANCY IN THIRD TRIMESTER (H): ICD-10-CM

## 2022-08-16 DIAGNOSIS — F11.20 METHADONE MAINTENANCE TREATMENT AFFECTING PREGNANCY IN THIRD TRIMESTER (H): ICD-10-CM

## 2022-08-16 DIAGNOSIS — O36.63X0 EXCESSIVE FETAL GROWTH AFFECTING MANAGEMENT OF PREGNANCY IN THIRD TRIMESTER, SINGLE OR UNSPECIFIED FETUS: ICD-10-CM

## 2022-08-16 DIAGNOSIS — O09.629 ENCOUNTER FOR SUPERVISION OF HIGH RISK YOUNG MULTIGRAVIDA, ANTEPARTUM: Primary | ICD-10-CM

## 2022-08-16 DIAGNOSIS — O99.210 OBESITY IN PREGNANCY, ANTEPARTUM: Primary | ICD-10-CM

## 2022-08-16 LAB
HGB BLD-MCNC: 11.2 G/DL (ref 11.7–15.7)
HOLD SPECIMEN: NORMAL

## 2022-08-16 PROCEDURE — 87653 STREP B DNA AMP PROBE: CPT | Performed by: OBSTETRICS & GYNECOLOGY

## 2022-08-16 PROCEDURE — 76816 OB US FOLLOW-UP PER FETUS: CPT

## 2022-08-16 PROCEDURE — 76819 FETAL BIOPHYS PROFIL W/O NST: CPT

## 2022-08-16 PROCEDURE — 76819 FETAL BIOPHYS PROFIL W/O NST: CPT | Mod: 26 | Performed by: OBSTETRICS & GYNECOLOGY

## 2022-08-16 PROCEDURE — 76816 OB US FOLLOW-UP PER FETUS: CPT | Mod: 26 | Performed by: OBSTETRICS & GYNECOLOGY

## 2022-08-16 PROCEDURE — 36415 COLL VENOUS BLD VENIPUNCTURE: CPT | Performed by: OBSTETRICS & GYNECOLOGY

## 2022-08-16 PROCEDURE — 99207 PR PRENATAL VISIT: CPT | Performed by: OBSTETRICS & GYNECOLOGY

## 2022-08-16 NOTE — TELEPHONE ENCOUNTER
Type of surgery: ob  Location of surgery: [facility]  Date and time of surgery: 09/07/22 12:30PM  Surgeon: Conchita Lee assisting  Pre-Op Appt Date: surgeon  Post-Op Appt Date: 09/22/22 and 10/19/22   Packet sent out: given in clinic today  Pre-cert/Authorization completed:  Not Applicable  Date: 08/16/22     FABIENNE Jones/her/hers  [facility] Scheduler

## 2022-08-16 NOTE — PROGRESS NOTES
"Please see \"Imaging\" tab under \"Chart Review\" for details of today's visit.    Myrtle Allison    "

## 2022-08-16 NOTE — PROGRESS NOTES
" Return OB visit    Subjective:  Patient reports active fetal movement, no vaginal bleeding or leaking fluid. She denies contractions. She has no concerns today, getting ready for delivery.        Objective:  /69   Pulse 75   Ht 1.575 m (5' 2\")   Wt 125.1 kg (275 lb 14.4 oz)   LMP 2021   SpO2 96%   BMI 50.46 kg/m     See OB flow sheet    Assessment and Plan    Ruth Goel is a 32 year old  at 36w0d here for SRINIVASA visit, pregnancy complicated by REINA and obesity.     This visit:  -Patient schedule for CS on  with RR at 39w1d  -GBS and  Hemoglobin obtained   Next visit:  -Routine PNC       RTC in 1 week or sooner PRN     Violette Perez MD      "

## 2022-08-17 ENCOUNTER — TELEPHONE (OUTPATIENT)
Dept: OBGYN | Facility: CLINIC | Age: 32
End: 2022-08-17

## 2022-08-17 NOTE — TELEPHONE ENCOUNTER
LVMTCB to help schedule PAC consultation per KD.     Attempted cell phone number listed in chart, rang a few times but then got the busy dial tone.       Lay Jones/her/hers  [facility] Scheduler

## 2022-08-18 LAB — GP B STREP DNA SPEC QL NAA+PROBE: NEGATIVE

## 2022-08-23 ENCOUNTER — HOSPITAL ENCOUNTER (OUTPATIENT)
Dept: ULTRASOUND IMAGING | Facility: CLINIC | Age: 32
Discharge: HOME | End: 2022-08-23
Attending: OBSTETRICS & GYNECOLOGY
Payer: COMMERCIAL

## 2022-08-23 ENCOUNTER — OFFICE VISIT (OUTPATIENT)
Dept: MATERNAL FETAL MEDICINE | Facility: CLINIC | Age: 32
End: 2022-08-23
Attending: OBSTETRICS & GYNECOLOGY
Payer: COMMERCIAL

## 2022-08-23 ENCOUNTER — PRENATAL OFFICE VISIT (OUTPATIENT)
Dept: OBGYN | Facility: CLINIC | Age: 32
End: 2022-08-23
Payer: COMMERCIAL

## 2022-08-23 VITALS
DIASTOLIC BLOOD PRESSURE: 73 MMHG | SYSTOLIC BLOOD PRESSURE: 113 MMHG | BODY MASS INDEX: 50.61 KG/M2 | HEART RATE: 65 BPM | WEIGHT: 276.7 LBS

## 2022-08-23 DIAGNOSIS — O99.323 METHADONE MAINTENANCE TREATMENT AFFECTING PREGNANCY IN THIRD TRIMESTER (H): ICD-10-CM

## 2022-08-23 DIAGNOSIS — O99.210 OBESITY IN PREGNANCY, ANTEPARTUM: Primary | ICD-10-CM

## 2022-08-23 DIAGNOSIS — O09.629 ENCOUNTER FOR SUPERVISION OF HIGH RISK YOUNG MULTIGRAVIDA, ANTEPARTUM: Primary | ICD-10-CM

## 2022-08-23 DIAGNOSIS — O99.210 OBESITY IN PREGNANCY, ANTEPARTUM: ICD-10-CM

## 2022-08-23 DIAGNOSIS — F11.20 METHADONE MAINTENANCE TREATMENT AFFECTING PREGNANCY IN THIRD TRIMESTER (H): ICD-10-CM

## 2022-08-23 PROCEDURE — 99207 PR PRENATAL VISIT: CPT | Performed by: OBSTETRICS & GYNECOLOGY

## 2022-08-23 PROCEDURE — 76819 FETAL BIOPHYS PROFIL W/O NST: CPT

## 2022-08-23 PROCEDURE — 76819 FETAL BIOPHYS PROFIL W/O NST: CPT | Mod: 26 | Performed by: OBSTETRICS & GYNECOLOGY

## 2022-08-23 PROCEDURE — 59425 ANTEPARTUM CARE ONLY: CPT | Performed by: OBSTETRICS & GYNECOLOGY

## 2022-08-23 RX ORDER — ASPIRIN 81 MG/1
TABLET, COATED ORAL
COMMUNITY
Start: 2022-07-15 | End: 2022-08-30

## 2022-08-23 RX ORDER — PSEUDOEPHED/ACETAMINOPH/DIPHEN 30MG-500MG
TABLET ORAL
COMMUNITY
Start: 2022-06-14 | End: 2022-08-30

## 2022-08-23 RX ORDER — HYDROCODONE BITARTRATE AND ACETAMINOPHEN 5; 325 MG/1; MG/1
TABLET ORAL
COMMUNITY
Start: 2022-07-26 | End: 2022-08-30

## 2022-08-23 RX ORDER — CLONIDINE HYDROCHLORIDE 0.1 MG/1
TABLET ORAL
COMMUNITY
Start: 2022-06-13 | End: 2022-08-30

## 2022-08-23 RX ORDER — CLONIDINE HYDROCHLORIDE 0.1 MG/1
0.1 TABLET ORAL 2 TIMES DAILY PRN
COMMUNITY
Start: 2022-06-14

## 2022-08-23 RX ORDER — AMOXICILLIN 500 MG/1
CAPSULE ORAL
COMMUNITY
Start: 2022-07-26 | End: 2022-08-30

## 2022-08-23 RX ORDER — PENICILLIN V POTASSIUM 500 MG/1
TABLET, FILM COATED ORAL
COMMUNITY
Start: 2022-07-06 | End: 2022-08-30

## 2022-08-23 RX ORDER — METHADONE HYDROCHLORIDE 10 MG/1
110 TABLET ORAL
COMMUNITY
End: 2022-08-30

## 2022-08-23 RX ORDER — ASCORBIC ACID 500 MG
500 TABLET ORAL
COMMUNITY
Start: 2022-06-08 | End: 2022-08-30

## 2022-08-23 RX ORDER — ACETAMINOPHEN 500 MG
500-1000 TABLET ORAL EVERY 8 HOURS PRN
Status: ON HOLD | COMMUNITY
Start: 2022-06-10 | End: 2022-09-11

## 2022-08-23 RX ORDER — METHADONE HYDROCHLORIDE 10 MG/ML
130 CONCENTRATE ORAL DAILY
COMMUNITY
Start: 2022-06-13

## 2022-08-23 RX ORDER — ONDANSETRON 8 MG/1
TABLET, FILM COATED ORAL
COMMUNITY
Start: 2022-07-06 | End: 2022-08-30

## 2022-08-23 RX ORDER — ASPIRIN 81 MG/1
TABLET, CHEWABLE ORAL
COMMUNITY
Start: 2022-05-11 | End: 2022-08-30

## 2022-08-23 NOTE — PROGRESS NOTES
Please refer to ultrasound report under 'Imaging' Studies of 'Chart Review' tabs.    Dawit Velasquez M.D.

## 2022-08-23 NOTE — PROGRESS NOTES
Patient reports she is doing ok.  Had some contractions a few days ago with increased activity. None today.  Denies any vaginal bleeding, leaking fluid, headache, changes in vision, chest pain, chest pressure, right upper quadrant pain, or shortness of breath.  Discussed s/sx of pre-e and reasons to call and come in.  Reviewed s/sx of labor and ROM.  Discussed pre-op shower with surgical soap, NPO instructions, and pre-op labs.  Lay worked with patient to schedule PAC visit.  MFM ultrasound today 8/8 bPP.  Next MFM ultrasound and ob visit 8/30/22.  C section scheduled 9/7/22  Monica Lennon MD

## 2022-08-24 NOTE — TELEPHONE ENCOUNTER
FUTURE VISIT INFORMATION      SURGERY INFORMATION:    Date: 22    Location:  L+D    Surgeon:  Mari Lee MD Leadley, Velma Crump MD    Anesthesia Type:  Spinal    Procedure:  SECTION    RECORDS REQUESTED FROM:        Primary Care Provider: Elicia Reardon- [facility]    Most recent EKG+ Tracin22- Anna

## 2022-08-26 ENCOUNTER — MYC MEDICAL ADVICE (OUTPATIENT)
Dept: OBGYN | Facility: CLINIC | Age: 32
End: 2022-08-26

## 2022-08-30 ENCOUNTER — ANESTHESIA EVENT (OUTPATIENT)
Dept: PHARMACY | Facility: CLINIC | Age: 32
End: 2022-08-30

## 2022-08-30 ENCOUNTER — VIRTUAL VISIT (OUTPATIENT)
Dept: SURGERY | Facility: CLINIC | Age: 32
End: 2022-08-30
Payer: COMMERCIAL

## 2022-08-30 ENCOUNTER — PRE VISIT (OUTPATIENT)
Dept: SURGERY | Facility: CLINIC | Age: 32
End: 2022-08-30

## 2022-08-30 DIAGNOSIS — E66.01 MORBID OBESITY (H): ICD-10-CM

## 2022-08-30 DIAGNOSIS — F19.11 SUBSTANCE ABUSE IN REMISSION (H): ICD-10-CM

## 2022-08-30 DIAGNOSIS — Z01.818 PREOP EXAMINATION: Primary | ICD-10-CM

## 2022-08-30 PROCEDURE — 99203 OFFICE O/P NEW LOW 30 MIN: CPT | Mod: 95 | Performed by: NURSE PRACTITIONER

## 2022-08-30 ASSESSMENT — PAIN SCALES - GENERAL: PAINLEVEL: NO PAIN (0)

## 2022-08-30 ASSESSMENT — ENCOUNTER SYMPTOMS: ORTHOPNEA: 0

## 2022-08-30 NOTE — PHARMACY - PREOPERATIVE ASSESSMENT CENTER
PREOPERATIVE PAIN CONSULT FOR POSTOPERATIVE PAIN MANAGEMENT  Ruth Goel was interviewed via phone on 2022 prior to PAC Clinic appointment. Patient is preparing for the planned procedure with Dr. Lee on 22 at the [facility] for  SECTION.  These recommendations are intended for patients admitted to the hospital after a procedure and are only valid for 30 days from the date of service. If there are significant changes in opioid dosing between today and day of procedure the below recommendations may have to be adjusted.      RECOMMENDATIONS:   The following pain management recommendations are made based on information from today's visit and should not replace medical decision-making based on patient condition at the time of procedure or postoperatively.      - PREOPERATIVE:  + Long acting opioid - methadone concentrated liquid (10 mg/mL) take 130 mg every morning.  **Patient did not give verbal permission for me to call her methadone clinic to verify dose.   Please verify dose on admission.  [facility] [phone].  + Before procedure recommend acetaminophen 975 mg PO in pre-op (Written in pre-op by PAC GURWINDER)    - INTRAOPERATIVE (Anesthesiologist/CRNA to consider):   + Regional anesthesia - Defer to RAPS team - patient open to regional anesthesia and long acting regional block will be helpful in minimizing need for postoperative opioid.   + Avoid remifentanil - to reduce risk of developing hyperalgesia    - POSTOPERATIVE INPATIENT MANAGEMENT:  + Please consult the inpatient pain management service for postoperative pain management recommendations.     Opioid analgesic:  + Note: if neuraxial opioid or other long acting opioid (eg. Methadone) is given during procedure contact on-call pain provider for adjustment in opioid plan  + Note if regional approach includes an opioid via the epidural then defer opioid management to RAPS team.     + Continue home  methadone (concentrated liquid 10 mg/mL) - take 130 mg every morning.  Please verify dose with [facility] before giving and time dose 24 hr after last pre op dose.  + start oxycodone 5-10 mg PO q3hr PRN, if necessary could increase to oxycodone 10-15 mg PO q3hr PRN pain for a short period post op if 10 mg dose not effective and no issues with sedation or respiratory depression.          -- Start hydromorphone IV 0.3-0.5 mg IV every 2 hr PRN breakthrough pain not controlled by PO medication or prior to significant activity (eg. PT/OT sessions).  Would minimize use as much as able to avoid recidivism.      Nonopioid analgesics:   + regional anesthesia management per RAPS team  + Defer use of NSAID to surgeon  + Start acetaminophen 975 mg PO every 6 hr scheduled   + heat/ice PRN   + Initiate menthol 5% patch - 1 patch every 8 hours PRN pain.  Apply to intact skin only.      Stool softeners/Laxatives:   + Bowel regimen per primary team    Other:  + Recommend close monitoring of respiratory status postoperatively with capnography and continuous SpO2 monitoring. Would recommend continuing capnography beyond the usual 24 hr due to patient's opioid tolerance.     -------------------------------------------------------------------------------------------------------------------  - OUTPATIENT MEDICATIONS (related to pain management):  -- Long-acting opioid: methadone 130 mg once daily in the morning.   -- Short-acting opioid: none  -- Oral adjuvant(s): none  -- Topicals: none  -- Bowel regimen: senna PRN    -- Other relevant medications: hydroyzine 25 mg TID PRN anxiety, clonidine 0.1 mg BID PRN anxiety.     Verbal consent was given by patient to access pharmacy records and [place] Prescription Monitoring Profile: No  Outpatient opioids prescribed by [facility]     ASSESSMENT:    Ruth Goel has a history of opioid use disorder and is on methadone through [facility] and is preparing for above mentioned  surgery.  Patient is interviewed via phone call today and is alert/oriented and answers questions appropriately.  She endorses a history of fentanyl abuse (snorting) and has been on methadone since March.  She states she has remained sober from opioid abuse.  She denies any significant pain today.  Does endorse some mild back pain but this does not inhibit her mobility or ability to do ADLs.  She denies any sedation from the methadone and denies constipation.  She denies any history of sleep apnea and reports she sleeps through the night.  Denies any current alcohol or recreational drug use. Endorses smoking about 2 cigarettes per day currently.  This will be her 4th  and she reports she had a spinal block with her first and is open to regional anesthesia techniques.  Unfortunately, before we could discuss a detailed postoperative pain management plan, patient abruptly wanted to end the call and as she was at her methadone clinic and needed to take her dose.  Patient has this writers phone number to call back if she desires.  Will update note further if patient returns call.      Other pain therapies tried in the past (not an all inclusive list):   Codeine - states anaphylaxis rxn, says she tolerates norco so this was removed form her allergy list  Ibuprofen - states hives  Aspirin 81 mg - taking this currently   **unable to complete all questions regarding prior therapies.     Pain therapies never tried (not an all inclusive list):   Ketorolac - states never had this prior.   **unable to complete all questions regarding meds not tried.     If immediate assistance is needed please contact the pain service at the number below.     Reg Penaloza [facility]  2022  9:41 AM    If questions or concerns, please contact the Inpatient Pain Management Service:  Call [phone] after hours, weekends and holidays.   Page [phone] from 8 AM - 3 PM Mon - Fri.

## 2022-08-30 NOTE — H&P
"  Pre-Operative H & P     CC:  Preoperative exam to assess for increased cardiopulmonary risk while undergoing surgery and anesthesia.    Date of Encounter: 2022  Primary Care Physician:  David Garza     Reason for visit: No diagnosis found.    HAIR Goel is a 32 year old female who presents for pre-operative H & P in preparation for  Procedure Information     Date/Time: ***     Procedure: ***    Anesthesia type: ***    Pre-op diagnosis: ***    Location: {Location:[facility]}    Providers: ***          ***    {History obtained from:2494728::\"History is obtained from the patient\"} and chart review    Hx of abnormal bleeding or anti-platelet use: ***    Menstrual history: Patient's last menstrual period was 2021.: ***     Past Medical History  Past Medical History:   Diagnosis Date     Anxiety      Chickenpox      Depression      Mild intermittent asthma without complication      Obesity      PTSD (post-traumatic stress disorder)      Uncomplicated asthma     Well controlled     Varicella        Past Surgical History  Past Surgical History:   Procedure Laterality Date     AS REMOVE TONSILS/ADENOIDS,<13 Y/O        SECTION        SECTION  2010      SECTION  10/03/2012      SECTION      3 Csections     CHOLECYSTECTOMY       LAPAROSCOPIC CHOLECYSTECTOMY  2014     LAPAROSCOPIC CHOLECYSTECTOMY  2016     TONSILLECTOMY       TONSILLECTOMY & ADENOIDECTOMY Bilateral        Prior to Admission Medications  Current Outpatient Medications   Medication Sig Dispense Refill     acetaminophen (TYLENOL) 500 MG tablet Take 500-1,000 mg by mouth every 8 hours as needed       albuterol (PROAIR HFA;PROVENTIL HFA;VENTOLIN HFA) 90 mcg/actuation inhaler [ALBUTEROL (PROAIR HFA;PROVENTIL HFA;VENTOLIN HFA) 90 MCG/ACTUATION INHALER] Inhale 1-2 puffs every 6 (six) hours as needed for wheezing. 1 Inhaler 0     aspirin (ASA) 81 MG EC tablet Take 81 mg by mouth every evening       " cholecalciferol 25 MCG (1000 UT) TABS Take 25 mcg by mouth every evening       cloNIDine (CATAPRES) 0.1 MG tablet Take 0.1 mg by mouth 2 times daily as needed (anxiety)       FEROSUL 325 (65 Fe) MG tablet Take 325 mg by mouth every evening       hydrOXYzine (VISTARIL) 25 MG capsule Take 25 mg by mouth 3 times daily as needed for anxiety       melatonin 3 MG CAPS Take 3 mg by mouth nightly as needed       methadone (DOLOPHINE-INTENSOL) 10 MG/ML (HIGH CONC) solution Take 130 mg by mouth daily       Prenatal Vit-Fe Fumarate-FA (PRENATAL VITAMIN PLUS LOW IRON) 27-1 MG TABS Take 1 tablet by mouth every evening       SENNA-TIME 8.6 MG tablet Take 1 tablet by mouth daily as needed       SM ANTACID 500 MG chewable tablet Take 1-2 chew tab by mouth daily as needed       vitamin C (ASCORBIC ACID) 500 MG tablet Take 500 mg by mouth every evening       naloxone (NARCAN) 4 MG/0.1ML nasal spray As Needed (Patient not taking: Reported on 8/30/2022)         Allergies  Allergies   Allergen Reactions     Codeine Anaphylaxis     Ibuprofen      hives     Tramadol Other (See Comments)     Tramadol Unknown     seizures       Social History  Social History     Socioeconomic History     Marital status:      Spouse name: Not on file     Number of children: 2     Years of education: Not on file     Highest education level: Not on file   Occupational History     Occupation: unemployed   Tobacco Use     Smoking status: Current Some Day Smoker     Smokeless tobacco: Never Used     Tobacco comment: 1-2 per day with pregnancy   Vaping Use     Vaping Use: Never used   Substance and Sexual Activity     Alcohol use: Not Currently     Drug use: Not Currently     Types: Methamphetamines     Comment: -last use 1/5/2018     Sexual activity: Yes     Partners: Male   Other Topics Concern     Not on file   Social History Narrative    ** Merged History Encounter **         ** Data from: 8/16/22 Enc Dept: [facility]    ** Merged History Encounter **               ** Data from: 7/11/21 Enc Dept: [facility]    Patient is employed as a nursing assistant. 12/19/14: The patient has 2 small children.     Social Determinants of Health     Financial Resource Strain: Not on file   Food Insecurity: Not on file   Transportation Needs: Not on file   Physical Activity: Not on file   Stress: Not on file   Social Connections: Not on file   Intimate Partner Violence: Not on file   Housing Stability: Not on file       Family History  Family History   Problem Relation Age of Onset     Hypertension Mother      Hypertension Father      Ovarian Cancer Other      Breast Cancer No family hx of      Alcoholism Maternal Grandfather      Cirrhosis Maternal Grandfather      Substance Abuse Paternal Grandfather         drugs       Review of Systems  The complete review of systems is negative other than noted in the HPI or here.   Anesthesia Evaluation   Pt has had prior anesthetic.     No history of anesthetic complications       ROS/MED HX  ENT/Pulmonary:     (+) Intermittent, asthma Last exacerbation: 1/2022,  Treatment: Inhaler prn,   (-) recent URI   Neurologic:  - neg neurologic ROS     Cardiovascular:     (+) -----No previous cardiac testing  (-) SINGH and orthopnea/PND   METS/Exercise Tolerance: >4 METS    Hematologic:     (+) anemia,  (-) history of blood clots and history of blood transfusion   Musculoskeletal:  - neg musculoskeletal ROS     GI/Hepatic:  - neg GI/hepatic ROS     Renal/Genitourinary:  - neg Renal ROS     Endo:     (+) Obesity,     Psychiatric/Substance Use:     (+) psychiatric history anxiety and depression (PTSD)     Infectious Disease:    (-) Recent Fever   Malignancy:  - neg malignancy ROS     Other:            Virtual visit -  No vitals were obtained    Physical Exam  Constitutional: Awake, alert, cooperative, no apparent distress, and appears stated age.  Eyes: Pupils equal  HENT: Normocephalic  Respiratory: non labored breathing   Neurologic: Awake, alert,  oriented to name, place and time.   Neuropsychiatric: Calm, cooperative. Normal affect.      Prior Labs/Diagnostic Studies   All labs and imaging personally reviewed     EKG/ stress test - if available please see in ROS above   No results found.  No flowsheet data found.      The patient's records and results personally reviewed by this provider.     Outside records reviewed from: {AMB PAC RECORDS REVIEWED:631917}      Assessment  {Refresh the note after risk documentation is completed Link to Preoperative Risk Scoring :520748}    Ruth Goel is a 32 year old female seen as a PAC referral for risk assessment and optimization for anesthesia.    Plan/Recommendations  Pt will be optimized for the proposed procedure.  See below for details on the assessment, risk, and preoperative recommendations    NEUROLOGY  {History:992138}  {Chronic Pain (Optional):183152}  -Post Op delirium risk factors:  {Post Op Delirium:911619}    ENT  {Airway:562587}  Mallampati: {Mallampati:683614}  TM: {TM results:212510}    CARDIAC  {Cardiac:791113}  - METS (Metabolic Equivalents)  Patient performs 4 or more METS exercise without symptoms            Total Score: -        Criteria with incomplete data:    Functional Capacity: Unable to complete 4 METS      - This score is not calculated because of inadequate data     RCRI-Very low risk: Class 1 0.4% complication rate            Total Score: -        Criteria with incomplete data:    RCRI: High Risk Surgery    RCRI: CAD    RCRI: CHF    RCRI: Cerebrovascular Disease     RCRI: Diabetes    RCRI: Elevated Creatinine      - This score is not calculated because of inadequate data       PULMONARY  {ABIODUN (Optional):067683}  ABIODUN Low Risk            Total Score: -        Criteria with incomplete data:    ABIODUN: Snores loudly    ABIODUN: Often tired    ABIODUN: Observed stopped breathing    ABIODUN: Hypertension    ABIODUN: BMI over 35 kg/m2    ABIODUN: Over 50 ys old    ABIODUN: Neck Circum >16 in    ABIODUN: Male      - This score  "is not calculated because of inadequate data     {Asthma/COPD:361696}  - Tobacco History  {Refresh the note if updates are made Link to Tobacco History :789304}    History   Smoking Status     Current Some Day Smoker   Smokeless Tobacco     Never Used     Comment: 1-2 per day with pregnancy       GI  {GERD/GI (Optional):573742}  PONV Low Risk  Total Score: 1           1 AN PONV: Pt is Female        /RENAL  - Baseline Creatinine  ***    ENDOCRINE  {Add pt reported vitals then use <dot>vitalsptreported to pull into your note Link to Pt Reported Vitals Flowsheet :575894}  - BMI: Estimated body mass index is 50.61 kg/m  as calculated from the following:    Height as of 8/16/22: 1.575 m (5' 2\").    Weight as of 8/23/22: 125.5 kg (276 lb 11.2 oz).  {BMI Classification (Optional):076432}  {Endocrine (Optional):337614}    HEME  VTE Low Risk 0.26%            Total Score: 0      {Coagulopathy:499271}  {Anemia (Optional):204713}    MSK  Patient is NOT Frail            Total Score: -        Criteria with incomplete data:    Frailty: Weight loss 10 lbs or greater    Frailty: Slower walking speed    Frailty: Decrease in strength    Frailty: Increased exhaustion    Frailty: Overall lack of energy      - This score is not calculated because of inadequate data     {Frailty Follow Up (Optional):822813}    PSYCH  - ***    Patient was discussed with { PAC Providers:113944431}    The patient is optimized for their procedure. AVS with information on surgery time/arrival time, meds and NPO status given by nursing staff. No further diagnostic testing indicated.    Please refer to the physical examination documented by the anesthesiologist in the anesthesia record on the day of surgery.    Video-Visit Details    Type of service:  Video Visit    Patient verbally consented to video service today: YES    Video Start Time: {video visit start time for provider to select:176320}  Video End Time (time video stopped): ***    Originating " "Location (pt. Location): {patient location:953649::\"Home\"}    Distant Location (provider location):  [facility] PREOPERATIVE ASSESSMENT [facility]     Mode of Communication:  Video Conference via {platforms:629841::\"Pingpigeon\"}  On the day of service:     Prep time: *** minutes  Visit time: *** minutes  Documentation time: *** minutes  ------------------------------------------  Total time: *** minutes      FAITH Senior CNP  Preoperative Assessment Center  [facility]  Phone: [phone]  Fax: [phone]  "

## 2022-08-30 NOTE — PHARMACY - PREOPERATIVE ASSESSMENT CENTER
Preoperative Assessment Center Medication History Note    Medication history completed on August 30, 2022 by this writer. See Epic admission navigator for prior to admission medications. Operating room staff will still need to confirm medications and last dose information on day of surgery.     Medication history interview sources  Patient interview: Yes  Care Everywhere records: Yes  Surescripts pharmacy refill records: Yes  Other (if applicable):     Changes made to PTA medication list  Added: none  Deleted: amoxil, aspirin duplicate x2, bupropion, clonidine wrong dose x2, flexeril, escitalopram, flovent, Norco, dulera, naltrexone, naproxen x2, zofran, penicillin, duplicate vit C, duplicate vit D.  Changed: acetaminophen sig, ferrous sulfate, senna sig,  hydroxyzine, methadone dose,     Additional medication history information (including reliability of information, actions taken by pharmacist):    **Patient stated dose of methadone was 130 mg but did not give verbal permission for me to call her methadone clinic to verify dose and is virtual so will be unable to get written authorization.   Please verify dose on admission.  [facility] [phone].    -- No recent (within 30 days) course of antibiotics  -- No recent (within 30 days) course of systemic steroids  -- Reports not being on blood thinning medications   Except aspirin 81 mg.   -- Declines being on any other prescription or over-the-counter medications    Prior to Admission medications    Medication Sig Last Dose Taking? Auth Provider Long Term End Date   acetaminophen (TYLENOL) 500 MG tablet Take 500-1,000 mg by mouth every 8 hours as needed Taking Yes Reported, Patient     albuterol (PROAIR HFA;PROVENTIL HFA;VENTOLIN HFA) 90 mcg/actuation inhaler [ALBUTEROL (PROAIR HFA;PROVENTIL HFA;VENTOLIN HFA) 90 MCG/ACTUATION INHALER] Inhale 1-2 puffs every 6 (six) hours as needed for wheezing. Taking Yes Paulette Wright PA-C Yes    aspirin (ASA) 81 MG EC  tablet Take 81 mg by mouth every evening Taking Yes Reported, Patient     cholecalciferol 25 MCG (1000 UT) TABS Take 25 mcg by mouth every evening Taking Yes Reported, Patient     cloNIDine (CATAPRES) 0.1 MG tablet Take 0.1 mg by mouth 2 times daily as needed (anxiety) Taking Yes Reported, Patient Yes    FEROSUL 325 (65 Fe) MG tablet Take 325 mg by mouth every evening Taking Yes Reported, Patient     hydrOXYzine (VISTARIL) 25 MG capsule Take 25 mg by mouth 3 times daily as needed for anxiety Taking Yes Reported, Patient     melatonin 3 MG CAPS Take 3 mg by mouth nightly as needed Taking Yes Reported, Patient     methadone (DOLOPHINE-INTENSOL) 10 MG/ML (HIGH CONC) solution Take 130 mg by mouth daily Taking Yes Reported, Patient     Prenatal Vit-Fe Fumarate-FA (PRENATAL VITAMIN PLUS LOW IRON) 27-1 MG TABS Take 1 tablet by mouth every evening Taking Yes Reported, Patient     SENNA-TIME 8.6 MG tablet Take 1 tablet by mouth daily as needed Taking Yes Reported, Patient     SM ANTACID 500 MG chewable tablet Take 1-2 chew tab by mouth daily as needed Taking Yes Reported, Patient     vitamin C (ASCORBIC ACID) 500 MG tablet Take 500 mg by mouth every evening Taking Yes Reported, Patient     naloxone (NARCAN) 4 MG/0.1ML nasal spray As Needed  Patient not taking: Reported on 8/30/2022 Not Taking  Reported, Patient Yes           Medication history completed by: Reg Penaloza, [facility]

## 2022-08-30 NOTE — ANESTHESIA PREPROCEDURE EVALUATION
Anesthesia Pre-Procedure Evaluation    Patient: Ruth Goel   MRN: [id number] : 1990        Procedure :   PAC EVALUATION       Past Medical History:   Diagnosis Date     Anxiety      Chickenpox      Depression      Mild intermittent asthma without complication      Obesity      PTSD (post-traumatic stress disorder)      Uncomplicated asthma     Well controlled     Varicella       Past Surgical History:   Procedure Laterality Date     AS REMOVE TONSILS/ADENOIDS,<15 Y/O        SECTION        SECTION  2010      SECTION  10/03/2012      SECTION      3 Csections     CHOLECYSTECTOMY       LAPAROSCOPIC CHOLECYSTECTOMY  2014     LAPAROSCOPIC CHOLECYSTECTOMY  2016     TONSILLECTOMY       TONSILLECTOMY & ADENOIDECTOMY Bilateral       Allergies   Allergen Reactions     Codeine Anaphylaxis     Ibuprofen      hives     Tramadol Other (See Comments)     Tramadol Unknown     seizures      Social History     Tobacco Use     Smoking status: Current Some Day Smoker     Smokeless tobacco: Never Used     Tobacco comment: 1-2 per day with pregnancy   Substance Use Topics     Alcohol use: Not Currently      Wt Readings from Last 1 Encounters:   22 125.5 kg (276 lb 11.2 oz)              OUTSIDE LABS:  CBC:   Lab Results   Component Value Date    WBC 7.7 2022    WBC 8.3 2022    HGB 11.2 (L) 2022    HGB 10.6 (L) 2022    HGB 10.5 (L) 2022    HCT 30.7 (L) 2022    HCT 33.9 (L) 2022     2022     2022     BMP:   Lab Results   Component Value Date     2022    POTASSIUM 3.8 2022    CHLORIDE 107 2022    CO2 23 2022    BUN 3 (L) 2022    CR 0.51 (L) 2022    CR 0.54 2018    GLC 86 2022     COAGS: No results found for: PTT, INR, FIBR  POC: No results found for: BGM, HCG, HCGS  HEPATIC:   Lab Results   Component Value Date    ALBUMIN 3.2 (L) 2022    PROTTOTAL 6.5 2022     ALT 9 2022    AST 12 2022    ALKPHOS 53 2022    BILITOTAL 0.2 2022     OTHER:   Lab Results   Component Value Date    AKASH 9.0 2022    LIPASE <9 2022             PAC Discussion and Assessment                                                      PAC Pharmacist Assessment: PREOPERATIVE PAIN CONSULT FOR POSTOPERATIVE PAIN MANAGEMENT  Ruth Goel was interviewed via phone on 2022 prior to PAC Clinic appointment. Patient is preparing for the planned procedure with Dr. Lee on 22 at the [facility] for  SECTION.  These recommendations are intended for patients admitted to the hospital after a procedure and are only valid for 30 days from the date of service. If there are significant changes in opioid dosing between today and day of procedure the below recommendations may have to be adjusted.       RECOMMENDATIONS:   The following pain management recommendations are made based on information from today's visit and should not replace medical decision-making based on patient condition at the time of procedure or postoperatively.       - PREOPERATIVE:  + Long acting opioid - methadone concentrated liquid (10 mg/mL) take 130 mg every morning.  **Patient did not give verbal permission for me to call her methadone clinic to verify dose.   Please verify dose on admission.  [facility] [phone].  + Before procedure recommend acetaminophen 975 mg PO in pre-op (Written in pre-op by PAC GURWINDER)     - INTRAOPERATIVE (Anesthesiologist/CRNA to consider):   + Regional anesthesia - Defer to RAPS team - patient open to regional anesthesia and long acting regional block will be helpful in minimizing need for postoperative opioid.   + Avoid remifentanil - to reduce risk of developing hyperalgesia     - POSTOPERATIVE INPATIENT MANAGEMENT:  + Please consult the inpatient pain management service for postoperative pain management recommendations.       Opioid analgesic:  + Note: if neuraxial opioid or other long acting opioid (eg. Methadone) is given during procedure contact on-call pain provider for adjustment in opioid plan  + Note if regional approach includes an opioid via the epidural then defer opioid management to RAPS team.      + Continue home methadone (concentrated liquid 10 mg/mL) - take 130 mg every morning.  Please verify dose with [facility] [place] before giving and time dose 24 hr after last pre op dose.  + start oxycodone 5-10 mg PO q3hr PRN, if necessary could increase to oxycodone 10-15 mg PO q3hr PRN pain for a short period post op if 10 mg dose not effective and no issues with sedation or respiratory depression.          -- Start hydromorphone IV 0.3-0.5 mg IV every 2 hr PRN breakthrough pain not controlled by PO medication or prior to significant activity (eg. PT/OT sessions).  Would minimize use as much as able to avoid recidivism.       Nonopioid analgesics:   + Defer use of NSAID to surgeon  + Start acetaminophen 975 mg PO every 6 hr scheduled   + heat/ice PRN      Stool softeners/Laxatives:   + Bowel regimen per primary team     Other:  + Recommend close monitoring of respiratory status postoperatively with capnography and continuous SpO2 monitoring. Would recommend continuing capnography beyond the usual 24 hr due to patient's opioid tolerance.      -------------------------------------------------------------------------------------------------------------------  - OUTPATIENT MEDICATIONS (related to pain management):  -- Long-acting opioid: methadone 130 mg once daily in the morning.   -- Short-acting opioid: none  -- Oral adjuvant(s): none  -- Topicals: none  -- Bowel regimen: senna PRN    -- Other relevant medications: hydroyzine 25 mg TID PRN anxiety, clonidine 0.1 mg BID PRN anxiety.      Verbal consent was given by patient to access pharmacy records and [place] Prescription Monitoring Profile: No  Outpatient opioids prescribed  by [facility]      ASSESSMENT:    Ruth Goel has a history of opioid use disorder and is on methadone through [facility] and is preparing for above mentioned surgery.  Patient is interviewed via phone call today and is alert/oriented and answers questions appropriately.  She endorses a history of fentanyl abuse (snorting) and has been on methadone since March.  She states she has remained sober from opioid abuse.  She denies any significant pain today.  Does endorse some mild back pain but this does not inhibit her mobility or ability to do ADLs.  She denies any sedation from the methadone and denies constipation.  She denies any history of sleep apnea and reports she sleeps through the night.  Denies any current alcohol or recreational drug use. Endorses smoking about 2 cigarettes per day currently.  This will be her 4th  and she reports she had a spinal block with her first and is open to regional anesthesia techniques.  Unfortunately, before we could discuss a detailed postoperative pain management plan, patient abruptly wanted to end the call and as she was at her methadone clinic and needed to take her dose.  Patient has this writers phone number to call back if she desires.  Will update note further if patient returns call.       Other pain therapies tried in the past (not an all inclusive list):   Codeine - states anaphylaxis rxn, says she tolerates norco so this was removed form her allergy list  Ibuprofen - states hives  Aspirin 81 mg - taking this currently   **unable to complete all questions regarding prior therapies.      Pain therapies never tried (not an all inclusive list):   Ketorolac - states never had this prior.   **unable to complete all questions regarding meds not tried.      If immediate assistance is needed please contact the pain service at the number below.      Reg Penaloza [facility]  2022  9:41 AM     If questions or concerns, please contact the Inpatient  Pain Management Service:  Call [phone] after hours, weekends and holidays.   Page [phone] from 8 AM - 3 PM Mon - Fri.     Reviewed and Signed by PAC Pharmacist  Pharmacist: WILL  Date: 8/30/22  Time: Dieuodnne Penaloza RP

## 2022-08-30 NOTE — PROGRESS NOTES
Ruth is a 32 year old who is being evaluated via a billable video visit.      How would you like to obtain your AVS? MyChart  If the video visit is dropped, the invitation should be resent by: Text to cell phone: [phone]    HPI       Review of Systems         Objective    Vitals - Patient Reported  Pain Score: No Pain (0)        Physical Exam     .  ..

## 2022-08-31 DIAGNOSIS — Z98.891 HISTORY OF CESAREAN DELIVERY: ICD-10-CM

## 2022-08-31 DIAGNOSIS — Z01.818 PRE-OP EXAM: Primary | ICD-10-CM

## 2022-08-31 ASSESSMENT — LIFESTYLE VARIABLES: TOBACCO_USE: 1

## 2022-08-31 ASSESSMENT — ENCOUNTER SYMPTOMS: ORTHOPNEA: 0

## 2022-09-01 NOTE — CONSULTS
Anesthesia Consult Note      Reason for consult:   High Risk OB consult  No diagnosis found.      Date of Encounter: 2022  Referring Physician: OB/GYN  Primary Care Physician:  David Garza  Ruth Goel is a 32 year old woman who is currently  who is due on 22. She is being seen in our clinic for high risk OB consult due to morbid obesity and history of substance abuse. The patient has an OB history significant for: pre-eclampsia.  She is scheduled for a  on 22.    OB Hx:     /parity:     History of complications of pregnancy  Eclampsia    History of obstetrical surgery  History of 3 prior c-sections with regional anesthesia.      History of bleeding/coagulopathy  none    History of anesthesia issues in patient or 1st degree relative  No previous issues with anesthesia for the patient or a first degree relative    History is obtained from the patient.     Past Medical History  Past Medical History:   Diagnosis Date     Anxiety      Chickenpox      Depression      History of pre-eclampsia in prior pregnancy, currently pregnant      Mild intermittent asthma without complication      morbid obesity      PTSD (post-traumatic stress disorder)      Substance abuse in remission (H)      Tobacco abuse      Uncomplicated asthma     Well controlled     Varicella        Past Surgical History  Past Surgical History:   Procedure Laterality Date     AS REMOVE TONSILS/ADENOIDS,<13 Y/O        SECTION        SECTION  2010      SECTION  10/03/2012      SECTION      3 Csections     CHOLECYSTECTOMY       LAPAROSCOPIC CHOLECYSTECTOMY  2014     LAPAROSCOPIC CHOLECYSTECTOMY  2016     TONSILLECTOMY       TONSILLECTOMY & ADENOIDECTOMY Bilateral        Prior to Admission Medications  Current Outpatient Medications   Medication Sig Dispense Refill     acetaminophen (TYLENOL) 500 MG tablet Take 500-1,000 mg by mouth every 8 hours as needed        albuterol (PROAIR HFA;PROVENTIL HFA;VENTOLIN HFA) 90 mcg/actuation inhaler [ALBUTEROL (PROAIR HFA;PROVENTIL HFA;VENTOLIN HFA) 90 MCG/ACTUATION INHALER] Inhale 1-2 puffs every 6 (six) hours as needed for wheezing. 1 Inhaler 0     aspirin (ASA) 81 MG EC tablet Take 81 mg by mouth every evening       cholecalciferol 25 MCG (1000 UT) TABS Take 25 mcg by mouth every evening       cloNIDine (CATAPRES) 0.1 MG tablet Take 0.1 mg by mouth 2 times daily as needed (anxiety)       FEROSUL 325 (65 Fe) MG tablet Take 325 mg by mouth every evening       hydrOXYzine (VISTARIL) 25 MG capsule Take 25 mg by mouth 3 times daily as needed for anxiety       melatonin 3 MG CAPS Take 3 mg by mouth nightly as needed       methadone (DOLOPHINE-INTENSOL) 10 MG/ML (HIGH CONC) solution Take 130 mg by mouth daily       Prenatal Vit-Fe Fumarate-FA (PRENATAL VITAMIN PLUS LOW IRON) 27-1 MG TABS Take 1 tablet by mouth every evening       SENNA-TIME 8.6 MG tablet Take 1 tablet by mouth daily as needed       SM ANTACID 500 MG chewable tablet Take 1-2 chew tab by mouth daily as needed       vitamin C (ASCORBIC ACID) 500 MG tablet Take 500 mg by mouth every evening       naloxone (NARCAN) 4 MG/0.1ML nasal spray As Needed (Patient not taking: Reported on 8/30/2022)         Menstrual history: Patient's last menstrual period was 12/01/2021.:     Allergies  Allergies   Allergen Reactions     Codeine Anaphylaxis     Ibuprofen      hives     Tramadol Other (See Comments)     Tramadol Unknown     seizures       Social History  Social History     Socioeconomic History     Marital status:      Spouse name: Not on file     Number of children: 2     Years of education: Not on file     Highest education level: Not on file   Occupational History     Occupation: unemployed   Tobacco Use     Smoking status: Current Some Day Smoker     Smokeless tobacco: Never Used     Tobacco comment: 1-2 per day with pregnancy   Vaping Use     Vaping Use: Never used    Substance and Sexual Activity     Alcohol use: Not Currently     Drug use: Not Currently     Types: Methamphetamines     Comment: -last use 1/5/2018     Sexual activity: Yes     Partners: Male   Other Topics Concern     Not on file   Social History Narrative    ** Merged History Encounter **         ** Data from: 8/16/22 Enc Dept: [facility]    ** Merged History Encounter **              ** Data from: 7/11/21 Enc Dept: [facility]    Patient is employed as a nursing assistant. 12/19/14: The patient has 2 small children.     Social Determinants of Health     Financial Resource Strain: Not on file   Food Insecurity: Not on file   Transportation Needs: Not on file   Physical Activity: Not on file   Stress: Not on file   Social Connections: Not on file   Intimate Partner Violence: Not on file   Housing Stability: Not on file       Family History  Family History   Problem Relation Age of Onset     Hypertension Mother      Hypertension Father      Ovarian Cancer Other      Breast Cancer No family hx of      Alcoholism Maternal Grandfather      Cirrhosis Maternal Grandfather      Substance Abuse Paternal Grandfather         drugs       The complete review of systems is negative other than noted in the HPI or here. Anesthesia Evaluation   Pt has had prior anesthetic.     No history of anesthetic complications       ROS/MED HX  ENT/Pulmonary:     (+) ABIODUN risk factors, obese, tobacco use, Current use, Intermittent, asthma Last exacerbation: 1/2022,  Treatment: Inhaler prn,   (-) recent URI   Neurologic:  - neg neurologic ROS     Cardiovascular: Comment: History of pre-eclampsia    (+) -----Previous cardiac testing   Echo: Date: Results:    Stress Test: Date: Results:    ECG Reviewed: Date: 6/2022 Results:  Sinus bradycardia  Cath: Date: Results:   (-) SINGH and orthopnea/PND   METS/Exercise Tolerance: >4 METS Comment: Reports that she walks 1 mile regularly for exercise   Hematologic:     (+) anemia,  (-) history of blood  clots and history of blood transfusion   Musculoskeletal:  - neg musculoskeletal ROS     GI/Hepatic:  - neg GI/hepatic ROS     Renal/Genitourinary:  - neg Renal ROS     Endo:     (+) Obesity,     Psychiatric/Substance Use:     (+) psychiatric history anxiety, other (comment) and depression (PTSD) Recreational drug usage: Other (Comment) (hx of polysubstance abuse.  ).    Infectious Disease:  - neg infectious disease ROS     Malignancy:  - neg malignancy ROS     Other: Comment: Currently pregnant             Virtual visit -  No vitals were obtained    Physical Exam - virtual visit  Constitutional: Awake, alert, cooperative, no apparent distress, and appears stated age.  Neurologic: Awake, alert, oriented to name, place and time.   Neuropsychiatric: Calm, cooperative. Labile, disinterested affect.     Labs / testing: (personally reviewed)    Outside records reviewed from:  Care Everywhere    Assessment      Ruth Goel is a 32 year old female seen as a PAC referral for risk assessment and optimization for anesthesia.    Plan/Recommendations  Pt will be optimized for the proposed procedure.  See below for details on the assessment, risk, and preoperative recommendations    NEUROLOGY  - No history of TIA, CVA or seizure    -Post Op delirium risk factors:  No risk identified    ENT  - No current airway concerns.  Will need to be reassessed day of surgery.  Mallampati: Unable to assess  TM: Unable to assess    CARDIAC  - history of pre-eclampsia.  Currently on aspirin as directed by OB for prophylaxis.  Will continue aspirin daily with no interruption prior to delivery.    - METS (Metabolic Equivalents)  Patient performs 4 or more METS exercise without symptoms            Total Score: 0      RCRI-Very low risk: Class 1 0.4% complication rate            Total Score: 0        PULMONARY    ABIODUN Low Risk            Total Score: 1    ABIODUN: BMI over 35 kg/m2      - Asthma  Well controlled  - Tobacco History      History  "  Smoking Status     Current Some Day Smoker   Smokeless Tobacco     Never Used     Comment: 1-2 per day with pregnancy       GI  - denies GERD  PONV Medium Risk  Total Score: 2           1 AN PONV: Pt is Female    1 AN PONV: Intended Post Op Opioids            ENDOCRINE   - BMI: Estimated body mass index is 50.61 kg/m  as calculated from the following:    Height as of 8/16/22: 1.575 m (5' 2\").    Weight as of 8/23/22: 125.5 kg (276 lb 11.2 oz).  Class 3 Obesity (BMI > 40)  - No history of Diabetes Mellitus    HEME  VTE Low Risk 0.26%            Total Score: 1    VTE: BMI greater than 39      - aspirin as noted above.  - Chronic anemia with a most recent hgb of 10.5.  Recommend perioperative use of blood conservation techniques intraoperatively and close monitoring for postoperative bleeding.        PSYCH  - history of polysubstance abuse.  Remission date isn't quite clear, but she reports she has been in recovery since she found out she was pregnant.  She is currently doing outpatient treatment and is on methadone through a methadone clinic.  She is aware to stay on her methadone as is with no interruption or dose decrease prior to delivery.  Please see pharmD note for full details and post-op pain management recommendations.      Anesthesia  - history of 3 prior c-sections.  She notes that she has had both spinal and an epidural before and tolerated both well with no complications.  - please note BMI      Virtual visit - Please refer to the physical examination documented by the anesthesiologist in the anesthesia record on the day of surgery.      Will notify Dr. Livingston and Dr. Jacobson of visit and will add patient to the high risk OB anesthesia list.      Video-Visit Details    Type of service:  Video Visit    Patient verbally consented to video service today: YES    Video Start Time: 1803  Video End Time (time video stopped): 1818    Originating Location (pt. Location): Home    Distant Location (provider " location):  provider's home    Mode of Communication:  Video Conference via AKT     On the day of service:     Prep time: 17 minutes  Visit time: 15 minutes  Documentation time: 0 minutes  ------------------------------------------  Total time: 32 minutes    FAITH Senior CNP    Preoperative Assessment [facility]  [facility]  Office phone: [phone]  Fax: [phone]

## 2022-09-05 LAB
ABO/RH(D): NORMAL
ANTIBODY SCREEN: NEGATIVE
SPECIMEN EXPIRATION DATE: NORMAL

## 2022-09-06 ENCOUNTER — OFFICE VISIT (OUTPATIENT)
Dept: MATERNAL FETAL MEDICINE | Facility: CLINIC | Age: 32
End: 2022-09-06
Attending: OBSTETRICS & GYNECOLOGY
Payer: COMMERCIAL

## 2022-09-06 ENCOUNTER — HOSPITAL ENCOUNTER (OUTPATIENT)
Dept: ULTRASOUND IMAGING | Facility: CLINIC | Age: 32
Discharge: HOME | End: 2022-09-06
Attending: OBSTETRICS & GYNECOLOGY
Payer: COMMERCIAL

## 2022-09-06 ENCOUNTER — LAB (OUTPATIENT)
Dept: LAB | Facility: CLINIC | Age: 32
End: 2022-09-06

## 2022-09-06 DIAGNOSIS — O99.210 OBESITY IN PREGNANCY, ANTEPARTUM: Primary | ICD-10-CM

## 2022-09-06 DIAGNOSIS — F11.20 METHADONE MAINTENANCE TREATMENT AFFECTING PREGNANCY IN THIRD TRIMESTER (H): ICD-10-CM

## 2022-09-06 DIAGNOSIS — Z98.891 HISTORY OF CESAREAN DELIVERY: ICD-10-CM

## 2022-09-06 DIAGNOSIS — E66.01 OBESITY, CLASS III, BMI 40-49.9 (MORBID OBESITY) (H): ICD-10-CM

## 2022-09-06 DIAGNOSIS — O99.210 OBESITY IN PREGNANCY, ANTEPARTUM: ICD-10-CM

## 2022-09-06 DIAGNOSIS — Z01.818 PRE-OP EXAM: ICD-10-CM

## 2022-09-06 DIAGNOSIS — O99.323 METHADONE MAINTENANCE TREATMENT AFFECTING PREGNANCY IN THIRD TRIMESTER (H): ICD-10-CM

## 2022-09-06 LAB
ERYTHROCYTE [DISTWIDTH] IN BLOOD BY AUTOMATED COUNT: 13.4 % (ref 10–15)
HCT VFR BLD AUTO: 34.6 % (ref 35–47)
HGB BLD-MCNC: 11.6 G/DL (ref 11.7–15.7)
MCH RBC QN AUTO: 29.1 PG (ref 26.5–33)
MCHC RBC AUTO-ENTMCNC: 33.5 G/DL (ref 31.5–36.5)
MCV RBC AUTO: 87 FL (ref 78–100)
PLATELET # BLD AUTO: 383 10E3/UL (ref 150–450)
RBC # BLD AUTO: 3.98 10E6/UL (ref 3.8–5.2)
SARS-COV-2 RNA RESP QL NAA+PROBE: NEGATIVE
WBC # BLD AUTO: 8 10E3/UL (ref 4–11)

## 2022-09-06 PROCEDURE — U0005 INFEC AGEN DETEC AMPLI PROBE: HCPCS

## 2022-09-06 PROCEDURE — 86900 BLOOD TYPING SEROLOGIC ABO: CPT

## 2022-09-06 PROCEDURE — 76819 FETAL BIOPHYS PROFIL W/O NST: CPT

## 2022-09-06 PROCEDURE — 76819 FETAL BIOPHYS PROFIL W/O NST: CPT | Mod: 26 | Performed by: OBSTETRICS & GYNECOLOGY

## 2022-09-06 PROCEDURE — 86850 RBC ANTIBODY SCREEN: CPT

## 2022-09-06 PROCEDURE — U0003 INFECTIOUS AGENT DETECTION BY NUCLEIC ACID (DNA OR RNA); SEVERE ACUTE RESPIRATORY SYNDROME CORONAVIRUS 2 (SARS-COV-2) (CORONAVIRUS DISEASE [COVID-19]), AMPLIFIED PROBE TECHNIQUE, MAKING USE OF HIGH THROUGHPUT TECHNOLOGIES AS DESCRIBED BY CMS-2020-01-R: HCPCS

## 2022-09-06 PROCEDURE — 36415 COLL VENOUS BLD VENIPUNCTURE: CPT

## 2022-09-06 PROCEDURE — 86901 BLOOD TYPING SEROLOGIC RH(D): CPT

## 2022-09-06 PROCEDURE — 85027 COMPLETE CBC AUTOMATED: CPT

## 2022-09-06 PROCEDURE — 86780 TREPONEMA PALLIDUM: CPT

## 2022-09-07 ENCOUNTER — ANESTHESIA (OUTPATIENT)
Dept: OBGYN | Facility: CLINIC | Age: 32
End: 2022-09-07
Payer: COMMERCIAL

## 2022-09-07 ENCOUNTER — HOSPITAL ENCOUNTER (INPATIENT)
Facility: CLINIC | Age: 32
LOS: 4 days | Discharge: HOME | End: 2022-09-11
Attending: OBSTETRICS & GYNECOLOGY | Admitting: OBSTETRICS & GYNECOLOGY
Payer: COMMERCIAL

## 2022-09-07 ENCOUNTER — ANESTHESIA EVENT (OUTPATIENT)
Dept: OBGYN | Facility: CLINIC | Age: 32
End: 2022-09-07
Payer: COMMERCIAL

## 2022-09-07 LAB
ABO/RH(D): NORMAL
AMPHETAMINES UR QL SCN: NORMAL
ANTIBODY SCREEN: NEGATIVE
CANNABINOIDS UR QL SCN: NORMAL
COCAINE UR QL: NORMAL
CREAT SERPL-MCNC: 0.5 MG/DL (ref 0.52–1.04)
ERYTHROCYTE [DISTWIDTH] IN BLOOD BY AUTOMATED COUNT: 13.2 % (ref 10–15)
GFR SERPL CREATININE-BSD FRML MDRD: >90 ML/MIN/1.73M2
HCT VFR BLD AUTO: 33.3 % (ref 35–47)
HGB BLD-MCNC: 11.2 G/DL (ref 11.7–15.7)
HOLD SPECIMEN: NORMAL
MCH RBC QN AUTO: 29.1 PG (ref 26.5–33)
MCHC RBC AUTO-ENTMCNC: 33.6 G/DL (ref 31.5–36.5)
MCV RBC AUTO: 87 FL (ref 78–100)
OPIATES UR QL SCN: NORMAL
PCP UR QL SCN: NORMAL
PLATELET # BLD AUTO: 396 10E3/UL (ref 150–450)
RBC # BLD AUTO: 3.85 10E6/UL (ref 3.8–5.2)
SPECIMEN EXPIRATION DATE: NORMAL
T PALLIDUM AB SER QL: NONREACTIVE
T PALLIDUM AB SER QL: NONREACTIVE
WBC # BLD AUTO: 7 10E3/UL (ref 4–11)

## 2022-09-07 PROCEDURE — 258N000003 HC RX IP 258 OP 636

## 2022-09-07 PROCEDURE — 999N000016 HC STATISTIC ATTENDANCE AT DELIVERY

## 2022-09-07 PROCEDURE — 59515 CESAREAN DELIVERY: CPT | Performed by: OBSTETRICS & GYNECOLOGY

## 2022-09-07 PROCEDURE — 250N000011 HC RX IP 250 OP 636: Performed by: ANESTHESIOLOGY

## 2022-09-07 PROCEDURE — 360N000076 HC SURGERY LEVEL 3, PER MIN: Performed by: OBSTETRICS & GYNECOLOGY

## 2022-09-07 PROCEDURE — 250N000013 HC RX MED GY IP 250 OP 250 PS 637: Performed by: STUDENT IN AN ORGANIZED HEALTH CARE EDUCATION/TRAINING PROGRAM

## 2022-09-07 PROCEDURE — 03HY32Z INSERTION OF MONITORING DEVICE INTO UPPER ARTERY, PERCUTANEOUS APPROACH: ICD-10-PCS | Performed by: ANESTHESIOLOGY

## 2022-09-07 PROCEDURE — 250N000009 HC RX 250: Performed by: ANESTHESIOLOGY

## 2022-09-07 PROCEDURE — 999N000141 HC STATISTIC PRE-PROCEDURE NURSING ASSESSMENT: Performed by: OBSTETRICS & GYNECOLOGY

## 2022-09-07 PROCEDURE — 258N000003 HC RX IP 258 OP 636: Performed by: NURSE ANESTHETIST, CERTIFIED REGISTERED

## 2022-09-07 PROCEDURE — 86901 BLOOD TYPING SEROLOGIC RH(D): CPT | Performed by: STUDENT IN AN ORGANIZED HEALTH CARE EDUCATION/TRAINING PROGRAM

## 2022-09-07 PROCEDURE — 59514 CESAREAN DELIVERY ONLY: CPT | Mod: 80 | Performed by: OBSTETRICS & GYNECOLOGY

## 2022-09-07 PROCEDURE — 250N000011 HC RX IP 250 OP 636

## 2022-09-07 PROCEDURE — 250N000011 HC RX IP 250 OP 636: Performed by: STUDENT IN AN ORGANIZED HEALTH CARE EDUCATION/TRAINING PROGRAM

## 2022-09-07 PROCEDURE — 86850 RBC ANTIBODY SCREEN: CPT | Performed by: STUDENT IN AN ORGANIZED HEALTH CARE EDUCATION/TRAINING PROGRAM

## 2022-09-07 PROCEDURE — 250N000009 HC RX 250: Performed by: OBSTETRICS & GYNECOLOGY

## 2022-09-07 PROCEDURE — 36415 COLL VENOUS BLD VENIPUNCTURE: CPT | Performed by: STUDENT IN AN ORGANIZED HEALTH CARE EDUCATION/TRAINING PROGRAM

## 2022-09-07 PROCEDURE — C1765 ADHESION BARRIER: HCPCS | Performed by: OBSTETRICS & GYNECOLOGY

## 2022-09-07 PROCEDURE — 3E033XZ INTRODUCTION OF VASOPRESSOR INTO PERIPHERAL VEIN, PERCUTANEOUS APPROACH: ICD-10-PCS | Performed by: ANESTHESIOLOGY

## 2022-09-07 PROCEDURE — 86780 TREPONEMA PALLIDUM: CPT | Performed by: STUDENT IN AN ORGANIZED HEALTH CARE EDUCATION/TRAINING PROGRAM

## 2022-09-07 PROCEDURE — 250N000009 HC RX 250: Performed by: NURSE ANESTHETIST, CERTIFIED REGISTERED

## 2022-09-07 PROCEDURE — 120N000002 HC R&B MED SURG/OB UMMC

## 2022-09-07 PROCEDURE — 258N000003 HC RX IP 258 OP 636: Performed by: OBSTETRICS & GYNECOLOGY

## 2022-09-07 PROCEDURE — 271N000001 HC OR GENERAL SUPPLY NON-STERILE: Performed by: OBSTETRICS & GYNECOLOGY

## 2022-09-07 PROCEDURE — 82565 ASSAY OF CREATININE: CPT | Performed by: STUDENT IN AN ORGANIZED HEALTH CARE EDUCATION/TRAINING PROGRAM

## 2022-09-07 PROCEDURE — 710N000011 HC RECOVERY PHASE 1, LEVEL 3, PER MIN: Performed by: OBSTETRICS & GYNECOLOGY

## 2022-09-07 PROCEDURE — 85014 HEMATOCRIT: CPT | Performed by: STUDENT IN AN ORGANIZED HEALTH CARE EDUCATION/TRAINING PROGRAM

## 2022-09-07 PROCEDURE — 370N000017 HC ANESTHESIA TECHNICAL FEE, PER MIN: Performed by: OBSTETRICS & GYNECOLOGY

## 2022-09-07 PROCEDURE — 258N000003 HC RX IP 258 OP 636: Performed by: STUDENT IN AN ORGANIZED HEALTH CARE EDUCATION/TRAINING PROGRAM

## 2022-09-07 PROCEDURE — 272N000001 HC OR GENERAL SUPPLY STERILE: Performed by: OBSTETRICS & GYNECOLOGY

## 2022-09-07 PROCEDURE — C9290 INJ, BUPIVACAINE LIPOSOME: HCPCS | Performed by: ANESTHESIOLOGY

## 2022-09-07 PROCEDURE — 999N000127 HC STATISTIC PERIPHERAL IV START W US GUIDANCE

## 2022-09-07 PROCEDURE — 250N000009 HC RX 250

## 2022-09-07 PROCEDURE — 80307 DRUG TEST PRSMV CHEM ANLYZR: CPT | Performed by: OBSTETRICS & GYNECOLOGY

## 2022-09-07 PROCEDURE — 250N000011 HC RX IP 250 OP 636: Performed by: NURSE ANESTHETIST, CERTIFIED REGISTERED

## 2022-09-07 RX ORDER — NALOXONE HYDROCHLORIDE 0.4 MG/ML
0.4 INJECTION, SOLUTION INTRAMUSCULAR; INTRAVENOUS; SUBCUTANEOUS
Status: DISCONTINUED | OUTPATIENT
Start: 2022-09-07 | End: 2022-09-11 | Stop reason: HOSPADM

## 2022-09-07 RX ORDER — PROPOFOL 10 MG/ML
INJECTION, EMULSION INTRAVENOUS PRN
Status: DISCONTINUED | OUTPATIENT
Start: 2022-09-07 | End: 2022-09-07

## 2022-09-07 RX ORDER — OXYCODONE HYDROCHLORIDE 5 MG/1
5 TABLET ORAL EVERY 4 HOURS PRN
Status: DISCONTINUED | OUTPATIENT
Start: 2022-09-07 | End: 2022-09-07 | Stop reason: HOSPADM

## 2022-09-07 RX ORDER — FENTANYL CITRATE 50 UG/ML
25 INJECTION, SOLUTION INTRAMUSCULAR; INTRAVENOUS EVERY 5 MIN PRN
Status: DISCONTINUED | OUTPATIENT
Start: 2022-09-07 | End: 2022-09-07 | Stop reason: HOSPADM

## 2022-09-07 RX ORDER — CEFAZOLIN SODIUM/WATER 3 G/30 ML
3 SYRINGE (ML) INTRAVENOUS SEE ADMIN INSTRUCTIONS
Status: DISCONTINUED | OUTPATIENT
Start: 2022-09-07 | End: 2022-09-07

## 2022-09-07 RX ORDER — BISACODYL 10 MG
10 SUPPOSITORY, RECTAL RECTAL DAILY PRN
Status: DISCONTINUED | OUTPATIENT
Start: 2022-09-09 | End: 2022-09-11 | Stop reason: HOSPADM

## 2022-09-07 RX ORDER — DIPHENHYDRAMINE HYDROCHLORIDE 50 MG/ML
25 INJECTION INTRAMUSCULAR; INTRAVENOUS EVERY 6 HOURS PRN
Status: DISCONTINUED | OUTPATIENT
Start: 2022-09-07 | End: 2022-09-11 | Stop reason: HOSPADM

## 2022-09-07 RX ORDER — SODIUM CHLORIDE, SODIUM LACTATE, POTASSIUM CHLORIDE, CALCIUM CHLORIDE 600; 310; 30; 20 MG/100ML; MG/100ML; MG/100ML; MG/100ML
INJECTION, SOLUTION INTRAVENOUS CONTINUOUS
Status: DISCONTINUED | OUTPATIENT
Start: 2022-09-07 | End: 2022-09-07 | Stop reason: HOSPADM

## 2022-09-07 RX ORDER — NALOXONE HYDROCHLORIDE 0.4 MG/ML
0.2 INJECTION, SOLUTION INTRAMUSCULAR; INTRAVENOUS; SUBCUTANEOUS
Status: DISCONTINUED | OUTPATIENT
Start: 2022-09-07 | End: 2022-09-11 | Stop reason: HOSPADM

## 2022-09-07 RX ORDER — GABAPENTIN 100 MG/1
100 CAPSULE ORAL 3 TIMES DAILY
Status: DISCONTINUED | OUTPATIENT
Start: 2022-09-08 | End: 2022-09-11 | Stop reason: HOSPADM

## 2022-09-07 RX ORDER — DEXTROSE, SODIUM CHLORIDE, SODIUM LACTATE, POTASSIUM CHLORIDE, AND CALCIUM CHLORIDE 5; .6; .31; .03; .02 G/100ML; G/100ML; G/100ML; G/100ML; G/100ML
INJECTION, SOLUTION INTRAVENOUS CONTINUOUS
Status: DISCONTINUED | OUTPATIENT
Start: 2022-09-07 | End: 2022-09-11 | Stop reason: HOSPADM

## 2022-09-07 RX ORDER — ACETAMINOPHEN 325 MG/1
975 TABLET ORAL EVERY 6 HOURS
Status: DISCONTINUED | OUTPATIENT
Start: 2022-09-07 | End: 2022-09-11 | Stop reason: HOSPADM

## 2022-09-07 RX ORDER — DEXMEDETOMIDINE HYDROCHLORIDE 4 UG/ML
INJECTION, SOLUTION INTRAVENOUS PRN
Status: DISCONTINUED | OUTPATIENT
Start: 2022-09-07 | End: 2022-09-07

## 2022-09-07 RX ORDER — SIMETHICONE 80 MG
80 TABLET,CHEWABLE ORAL 4 TIMES DAILY PRN
Status: DISCONTINUED | OUTPATIENT
Start: 2022-09-07 | End: 2022-09-11 | Stop reason: HOSPADM

## 2022-09-07 RX ORDER — HALOPERIDOL 5 MG/ML
1 INJECTION INTRAMUSCULAR
Status: DISCONTINUED | OUTPATIENT
Start: 2022-09-07 | End: 2022-09-07 | Stop reason: HOSPADM

## 2022-09-07 RX ORDER — MEPERIDINE HYDROCHLORIDE 25 MG/ML
12.5 INJECTION INTRAMUSCULAR; INTRAVENOUS; SUBCUTANEOUS EVERY 5 MIN PRN
Status: DISCONTINUED | OUTPATIENT
Start: 2022-09-07 | End: 2022-09-07 | Stop reason: HOSPADM

## 2022-09-07 RX ORDER — EPINEPHRINE 1 MG/ML
INJECTION, SOLUTION, CONCENTRATE INTRAVENOUS
Status: COMPLETED | OUTPATIENT
Start: 2022-09-07 | End: 2022-09-07

## 2022-09-07 RX ORDER — DIPHENHYDRAMINE HCL 25 MG
25 CAPSULE ORAL EVERY 6 HOURS PRN
Status: DISCONTINUED | OUTPATIENT
Start: 2022-09-07 | End: 2022-09-11 | Stop reason: HOSPADM

## 2022-09-07 RX ORDER — METHYLERGONOVINE MALEATE 0.2 MG/ML
200 INJECTION INTRAVENOUS
Status: DISCONTINUED | OUTPATIENT
Start: 2022-09-07 | End: 2022-09-07

## 2022-09-07 RX ORDER — OXYTOCIN 10 [USP'U]/ML
10 INJECTION, SOLUTION INTRAMUSCULAR; INTRAVENOUS
Status: DISCONTINUED | OUTPATIENT
Start: 2022-09-07 | End: 2022-09-11 | Stop reason: HOSPADM

## 2022-09-07 RX ORDER — DIPHENHYDRAMINE HYDROCHLORIDE 50 MG/ML
25 INJECTION INTRAMUSCULAR; INTRAVENOUS EVERY 6 HOURS PRN
Status: DISCONTINUED | OUTPATIENT
Start: 2022-09-07 | End: 2022-09-07 | Stop reason: HOSPADM

## 2022-09-07 RX ORDER — LIDOCAINE 40 MG/G
CREAM TOPICAL
Status: DISCONTINUED | OUTPATIENT
Start: 2022-09-07 | End: 2022-09-11 | Stop reason: HOSPADM

## 2022-09-07 RX ORDER — ALBUTEROL SULFATE 0.83 MG/ML
2.5 SOLUTION RESPIRATORY (INHALATION) EVERY 4 HOURS PRN
Status: DISCONTINUED | OUTPATIENT
Start: 2022-09-07 | End: 2022-09-07 | Stop reason: HOSPADM

## 2022-09-07 RX ORDER — HYDROCORTISONE 25 MG/G
CREAM TOPICAL 3 TIMES DAILY PRN
Status: DISCONTINUED | OUTPATIENT
Start: 2022-09-07 | End: 2022-09-11 | Stop reason: HOSPADM

## 2022-09-07 RX ORDER — FENTANYL CITRATE 50 UG/ML
50 INJECTION, SOLUTION INTRAMUSCULAR; INTRAVENOUS EVERY 5 MIN PRN
Status: DISCONTINUED | OUTPATIENT
Start: 2022-09-07 | End: 2022-09-07 | Stop reason: HOSPADM

## 2022-09-07 RX ORDER — OXYTOCIN 10 [USP'U]/ML
10 INJECTION, SOLUTION INTRAMUSCULAR; INTRAVENOUS
Status: DISCONTINUED | OUTPATIENT
Start: 2022-09-07 | End: 2022-09-07

## 2022-09-07 RX ORDER — ENOXAPARIN SODIUM 100 MG/ML
60 INJECTION SUBCUTANEOUS EVERY 12 HOURS
Status: DISCONTINUED | OUTPATIENT
Start: 2022-09-08 | End: 2022-09-11 | Stop reason: HOSPADM

## 2022-09-07 RX ORDER — MISOPROSTOL 200 UG/1
800 TABLET ORAL
Status: DISCONTINUED | OUTPATIENT
Start: 2022-09-07 | End: 2022-09-11 | Stop reason: HOSPADM

## 2022-09-07 RX ORDER — CITRIC ACID/SODIUM CITRATE 334-500MG
30 SOLUTION, ORAL ORAL
Status: COMPLETED | OUTPATIENT
Start: 2022-09-07 | End: 2022-09-07

## 2022-09-07 RX ORDER — MISOPROSTOL 200 UG/1
800 TABLET ORAL
Status: DISCONTINUED | OUTPATIENT
Start: 2022-09-07 | End: 2022-09-07

## 2022-09-07 RX ORDER — ONDANSETRON 2 MG/ML
INJECTION INTRAMUSCULAR; INTRAVENOUS PRN
Status: DISCONTINUED | OUTPATIENT
Start: 2022-09-07 | End: 2022-09-07

## 2022-09-07 RX ORDER — ALBUTEROL SULFATE 90 UG/1
1-2 AEROSOL, METERED RESPIRATORY (INHALATION) EVERY 6 HOURS PRN
Status: DISCONTINUED | OUTPATIENT
Start: 2022-09-07 | End: 2022-09-11 | Stop reason: HOSPADM

## 2022-09-07 RX ORDER — ONDANSETRON 4 MG/1
4 TABLET, ORALLY DISINTEGRATING ORAL EVERY 30 MIN PRN
Status: DISCONTINUED | OUTPATIENT
Start: 2022-09-07 | End: 2022-09-07 | Stop reason: HOSPADM

## 2022-09-07 RX ORDER — DIPHENHYDRAMINE HCL 25 MG
25 CAPSULE ORAL EVERY 6 HOURS PRN
Status: DISCONTINUED | OUTPATIENT
Start: 2022-09-07 | End: 2022-09-07 | Stop reason: HOSPADM

## 2022-09-07 RX ORDER — HYDROMORPHONE HCL IN WATER/PF 6 MG/30 ML
0.2 PATIENT CONTROLLED ANALGESIA SYRINGE INTRAVENOUS EVERY 5 MIN PRN
Status: DISCONTINUED | OUTPATIENT
Start: 2022-09-07 | End: 2022-09-07 | Stop reason: HOSPADM

## 2022-09-07 RX ORDER — SODIUM CHLORIDE, SODIUM LACTATE, POTASSIUM CHLORIDE, CALCIUM CHLORIDE 600; 310; 30; 20 MG/100ML; MG/100ML; MG/100ML; MG/100ML
INJECTION, SOLUTION INTRAVENOUS CONTINUOUS PRN
Status: DISCONTINUED | OUTPATIENT
Start: 2022-09-07 | End: 2022-09-07

## 2022-09-07 RX ORDER — MODIFIED LANOLIN
OINTMENT (GRAM) TOPICAL
Status: DISCONTINUED | OUTPATIENT
Start: 2022-09-07 | End: 2022-09-11 | Stop reason: HOSPADM

## 2022-09-07 RX ORDER — ACETAMINOPHEN 325 MG/1
975 TABLET ORAL ONCE
Status: DISCONTINUED | OUTPATIENT
Start: 2022-09-07 | End: 2022-09-07 | Stop reason: HOSPADM

## 2022-09-07 RX ORDER — HYDROXYZINE HYDROCHLORIDE 25 MG/1
25 TABLET, FILM COATED ORAL 3 TIMES DAILY PRN
Status: DISCONTINUED | OUTPATIENT
Start: 2022-09-07 | End: 2022-09-11 | Stop reason: HOSPADM

## 2022-09-07 RX ORDER — LABETALOL HYDROCHLORIDE 5 MG/ML
10 INJECTION, SOLUTION INTRAVENOUS
Status: DISCONTINUED | OUTPATIENT
Start: 2022-09-07 | End: 2022-09-07 | Stop reason: HOSPADM

## 2022-09-07 RX ORDER — OXYCODONE HYDROCHLORIDE 5 MG/1
10-15 TABLET ORAL EVERY 4 HOURS PRN
Status: DISCONTINUED | OUTPATIENT
Start: 2022-09-07 | End: 2022-09-09

## 2022-09-07 RX ORDER — METOCLOPRAMIDE 10 MG/1
10 TABLET ORAL EVERY 6 HOURS PRN
Status: DISCONTINUED | OUTPATIENT
Start: 2022-09-07 | End: 2022-09-11 | Stop reason: HOSPADM

## 2022-09-07 RX ORDER — METOCLOPRAMIDE HYDROCHLORIDE 5 MG/ML
10 INJECTION INTRAMUSCULAR; INTRAVENOUS EVERY 6 HOURS PRN
Status: DISCONTINUED | OUTPATIENT
Start: 2022-09-07 | End: 2022-09-11 | Stop reason: HOSPADM

## 2022-09-07 RX ORDER — KETOROLAC TROMETHAMINE 30 MG/ML
INJECTION, SOLUTION INTRAMUSCULAR; INTRAVENOUS PRN
Status: DISCONTINUED | OUTPATIENT
Start: 2022-09-07 | End: 2022-09-07

## 2022-09-07 RX ORDER — CARBOPROST TROMETHAMINE 250 UG/ML
250 INJECTION, SOLUTION INTRAMUSCULAR
Status: DISCONTINUED | OUTPATIENT
Start: 2022-09-07 | End: 2022-09-07

## 2022-09-07 RX ORDER — ONDANSETRON 2 MG/ML
4 INJECTION INTRAMUSCULAR; INTRAVENOUS EVERY 30 MIN PRN
Status: DISCONTINUED | OUTPATIENT
Start: 2022-09-07 | End: 2022-09-07 | Stop reason: HOSPADM

## 2022-09-07 RX ORDER — PROCHLORPERAZINE 25 MG
25 SUPPOSITORY, RECTAL RECTAL EVERY 12 HOURS PRN
Status: DISCONTINUED | OUTPATIENT
Start: 2022-09-07 | End: 2022-09-11 | Stop reason: HOSPADM

## 2022-09-07 RX ORDER — OXYTOCIN/0.9 % SODIUM CHLORIDE 30/500 ML
340 PLASTIC BAG, INJECTION (ML) INTRAVENOUS CONTINUOUS PRN
Status: DISCONTINUED | OUTPATIENT
Start: 2022-09-07 | End: 2022-09-11 | Stop reason: HOSPADM

## 2022-09-07 RX ORDER — CYCLOBENZAPRINE HCL 5 MG
10 TABLET ORAL 3 TIMES DAILY
Status: DISCONTINUED | OUTPATIENT
Start: 2022-09-08 | End: 2022-09-11 | Stop reason: HOSPADM

## 2022-09-07 RX ORDER — PROCHLORPERAZINE MALEATE 10 MG
10 TABLET ORAL EVERY 6 HOURS PRN
Status: DISCONTINUED | OUTPATIENT
Start: 2022-09-07 | End: 2022-09-11 | Stop reason: HOSPADM

## 2022-09-07 RX ORDER — SODIUM CHLORIDE, SODIUM LACTATE, POTASSIUM CHLORIDE, CALCIUM CHLORIDE 600; 310; 30; 20 MG/100ML; MG/100ML; MG/100ML; MG/100ML
INJECTION, SOLUTION INTRAVENOUS
Status: COMPLETED
Start: 2022-09-07 | End: 2022-09-07

## 2022-09-07 RX ORDER — OXYTOCIN/0.9 % SODIUM CHLORIDE 30/500 ML
PLASTIC BAG, INJECTION (ML) INTRAVENOUS CONTINUOUS PRN
Status: DISCONTINUED | OUTPATIENT
Start: 2022-09-07 | End: 2022-09-07

## 2022-09-07 RX ORDER — BUPIVACAINE HYDROCHLORIDE 7.5 MG/ML
INJECTION, SOLUTION INTRASPINAL
Status: COMPLETED | OUTPATIENT
Start: 2022-09-07 | End: 2022-09-07

## 2022-09-07 RX ORDER — METHADONE HYDROCHLORIDE 10 MG/ML
130 CONCENTRATE ORAL DAILY
Status: DISCONTINUED | OUTPATIENT
Start: 2022-09-08 | End: 2022-09-11 | Stop reason: HOSPADM

## 2022-09-07 RX ORDER — HYDROMORPHONE HCL IN WATER/PF 6 MG/30 ML
0.4 PATIENT CONTROLLED ANALGESIA SYRINGE INTRAVENOUS EVERY 5 MIN PRN
Status: DISCONTINUED | OUTPATIENT
Start: 2022-09-07 | End: 2022-09-07 | Stop reason: HOSPADM

## 2022-09-07 RX ORDER — METHYLERGONOVINE MALEATE 0.2 MG/ML
200 INJECTION INTRAVENOUS
Status: DISCONTINUED | OUTPATIENT
Start: 2022-09-07 | End: 2022-09-11 | Stop reason: HOSPADM

## 2022-09-07 RX ORDER — ACETAMINOPHEN 325 MG/1
975 TABLET ORAL ONCE
Status: COMPLETED | OUTPATIENT
Start: 2022-09-07 | End: 2022-09-07

## 2022-09-07 RX ORDER — LIDOCAINE 40 MG/G
CREAM TOPICAL
Status: DISCONTINUED | OUTPATIENT
Start: 2022-09-07 | End: 2022-09-07

## 2022-09-07 RX ORDER — CEFAZOLIN SODIUM/WATER 3 G/30 ML
3 SYRINGE (ML) INTRAVENOUS
Status: COMPLETED | OUTPATIENT
Start: 2022-09-07 | End: 2022-09-07

## 2022-09-07 RX ORDER — OXYTOCIN/0.9 % SODIUM CHLORIDE 30/500 ML
340 PLASTIC BAG, INJECTION (ML) INTRAVENOUS CONTINUOUS PRN
Status: DISCONTINUED | OUTPATIENT
Start: 2022-09-07 | End: 2022-09-07

## 2022-09-07 RX ORDER — LIDOCAINE HYDROCHLORIDE 10 MG/ML
INJECTION, SOLUTION INFILTRATION; PERINEURAL
Status: COMPLETED | OUTPATIENT
Start: 2022-09-07 | End: 2022-09-07

## 2022-09-07 RX ORDER — TRANEXAMIC ACID 10 MG/ML
1 INJECTION, SOLUTION INTRAVENOUS EVERY 30 MIN PRN
Status: DISCONTINUED | OUTPATIENT
Start: 2022-09-07 | End: 2022-09-07

## 2022-09-07 RX ORDER — MISOPROSTOL 200 UG/1
400 TABLET ORAL
Status: DISCONTINUED | OUTPATIENT
Start: 2022-09-07 | End: 2022-09-11 | Stop reason: HOSPADM

## 2022-09-07 RX ORDER — HYDROMORPHONE HCL IN WATER/PF 6 MG/30 ML
.2-.3 PATIENT CONTROLLED ANALGESIA SYRINGE INTRAVENOUS
Status: DISCONTINUED | OUTPATIENT
Start: 2022-09-07 | End: 2022-09-08

## 2022-09-07 RX ORDER — BUPIVACAINE HYDROCHLORIDE 2.5 MG/ML
INJECTION, SOLUTION EPIDURAL; INFILTRATION; INTRACAUDAL
Status: DISCONTINUED | OUTPATIENT
Start: 2022-09-07 | End: 2022-09-07

## 2022-09-07 RX ORDER — SODIUM CHLORIDE, SODIUM LACTATE, POTASSIUM CHLORIDE, CALCIUM CHLORIDE 600; 310; 30; 20 MG/100ML; MG/100ML; MG/100ML; MG/100ML
INJECTION, SOLUTION INTRAVENOUS CONTINUOUS
Status: DISCONTINUED | OUTPATIENT
Start: 2022-09-07 | End: 2022-09-07

## 2022-09-07 RX ORDER — TRANEXAMIC ACID 10 MG/ML
1 INJECTION, SOLUTION INTRAVENOUS EVERY 30 MIN PRN
Status: DISCONTINUED | OUTPATIENT
Start: 2022-09-07 | End: 2022-09-11 | Stop reason: HOSPADM

## 2022-09-07 RX ORDER — AMOXICILLIN 250 MG
1 CAPSULE ORAL 2 TIMES DAILY
Status: DISCONTINUED | OUTPATIENT
Start: 2022-09-07 | End: 2022-09-11 | Stop reason: HOSPADM

## 2022-09-07 RX ORDER — MISOPROSTOL 200 UG/1
400 TABLET ORAL
Status: DISCONTINUED | OUTPATIENT
Start: 2022-09-07 | End: 2022-09-07

## 2022-09-07 RX ORDER — ONDANSETRON 4 MG/1
4 TABLET, ORALLY DISINTEGRATING ORAL EVERY 6 HOURS PRN
Status: DISCONTINUED | OUTPATIENT
Start: 2022-09-07 | End: 2022-09-11 | Stop reason: HOSPADM

## 2022-09-07 RX ORDER — OXYTOCIN/0.9 % SODIUM CHLORIDE 30/500 ML
100-340 PLASTIC BAG, INJECTION (ML) INTRAVENOUS CONTINUOUS PRN
Status: DISCONTINUED | OUTPATIENT
Start: 2022-09-07 | End: 2022-09-07

## 2022-09-07 RX ORDER — ONDANSETRON 2 MG/ML
4 INJECTION INTRAMUSCULAR; INTRAVENOUS EVERY 6 HOURS PRN
Status: DISCONTINUED | OUTPATIENT
Start: 2022-09-07 | End: 2022-09-11 | Stop reason: HOSPADM

## 2022-09-07 RX ORDER — FENTANYL CITRATE 50 UG/ML
INJECTION, SOLUTION INTRAMUSCULAR; INTRAVENOUS PRN
Status: DISCONTINUED | OUTPATIENT
Start: 2022-09-07 | End: 2022-09-07

## 2022-09-07 RX ORDER — CARBOPROST TROMETHAMINE 250 UG/ML
250 INJECTION, SOLUTION INTRAMUSCULAR
Status: DISCONTINUED | OUTPATIENT
Start: 2022-09-07 | End: 2022-09-11 | Stop reason: HOSPADM

## 2022-09-07 RX ORDER — AMOXICILLIN 250 MG
2 CAPSULE ORAL 2 TIMES DAILY
Status: DISCONTINUED | OUTPATIENT
Start: 2022-09-07 | End: 2022-09-11 | Stop reason: HOSPADM

## 2022-09-07 RX ADMIN — OXYTOCIN-SODIUM CHLORIDE 0.9% IV SOLN 30 UNIT/500ML 600 ML/HR: 30-0.9/5 SOLUTION at 16:37

## 2022-09-07 RX ADMIN — EPINEPHRINE 0.2 MG: 1 INJECTION, SOLUTION, CONCENTRATE INTRAVENOUS at 15:46

## 2022-09-07 RX ADMIN — DEXMEDETOMIDINE 0.5 MCG/KG/HR: 100 INJECTION, SOLUTION, CONCENTRATE INTRAVENOUS at 17:35

## 2022-09-07 RX ADMIN — Medication 3 G: at 15:29

## 2022-09-07 RX ADMIN — SODIUM CHLORIDE, POTASSIUM CHLORIDE, SODIUM LACTATE AND CALCIUM CHLORIDE: 600; 310; 30; 20 INJECTION, SOLUTION INTRAVENOUS at 16:52

## 2022-09-07 RX ADMIN — FENTANYL CITRATE 50 MCG: 50 INJECTION, SOLUTION INTRAMUSCULAR; INTRAVENOUS at 16:39

## 2022-09-07 RX ADMIN — PROPOFOL 20 MG: 10 INJECTION, EMULSION INTRAVENOUS at 16:42

## 2022-09-07 RX ADMIN — BUPIVACAINE HYDROCHLORIDE 20 ML: 2.5 INJECTION, SOLUTION EPIDURAL; INFILTRATION; INTRACAUDAL at 19:30

## 2022-09-07 RX ADMIN — BUPIVACAINE HYDROCHLORIDE IN DEXTROSE 1.6 ML: 7.5 INJECTION, SOLUTION SUBARACHNOID at 15:46

## 2022-09-07 RX ADMIN — ACETAMINOPHEN 975 MG: 325 TABLET, FILM COATED ORAL at 14:30

## 2022-09-07 RX ADMIN — PHENYLEPHRINE HYDROCHLORIDE 100 MCG/MIN: 10 INJECTION INTRAVENOUS at 15:45

## 2022-09-07 RX ADMIN — SODIUM CHLORIDE, SODIUM LACTATE, POTASSIUM CHLORIDE, CALCIUM CHLORIDE AND DEXTROSE MONOHYDRATE: 5; 600; 310; 30; 20 INJECTION, SOLUTION INTRAVENOUS at 21:10

## 2022-09-07 RX ADMIN — KETOROLAC TROMETHAMINE 30 MG: 30 INJECTION, SOLUTION INTRAMUSCULAR at 17:40

## 2022-09-07 RX ADMIN — TRANEXAMIC ACID 1 G: 1 INJECTION, SOLUTION INTRAVENOUS at 16:51

## 2022-09-07 RX ADMIN — Medication 4 MCG: at 17:25

## 2022-09-07 RX ADMIN — SODIUM CHLORIDE, POTASSIUM CHLORIDE, SODIUM LACTATE AND CALCIUM CHLORIDE 1000 ML: 600; 310; 30; 20 INJECTION, SOLUTION INTRAVENOUS at 13:01

## 2022-09-07 RX ADMIN — ONDANSETRON 4 MG: 2 INJECTION INTRAMUSCULAR; INTRAVENOUS at 15:21

## 2022-09-07 RX ADMIN — SENNOSIDES AND DOCUSATE SODIUM 2 TABLET: 8.6; 5 TABLET ORAL at 21:10

## 2022-09-07 RX ADMIN — OXYCODONE HYDROCHLORIDE 10 MG: 5 TABLET ORAL at 22:35

## 2022-09-07 RX ADMIN — PHENYLEPHRINE HYDROCHLORIDE 200 MCG: 10 INJECTION INTRAVENOUS at 16:04

## 2022-09-07 RX ADMIN — PROPOFOL 20 MG: 10 INJECTION, EMULSION INTRAVENOUS at 17:09

## 2022-09-07 RX ADMIN — Medication 8 MCG: at 17:31

## 2022-09-07 RX ADMIN — Medication 8 MCG: at 17:23

## 2022-09-07 RX ADMIN — LIDOCAINE HYDROCHLORIDE 2 ML: 10 INJECTION, SOLUTION INFILTRATION; PERINEURAL at 16:10

## 2022-09-07 RX ADMIN — Medication 8 MCG: at 17:41

## 2022-09-07 RX ADMIN — SODIUM CHLORIDE, POTASSIUM CHLORIDE, SODIUM LACTATE AND CALCIUM CHLORIDE: 600; 310; 30; 20 INJECTION, SOLUTION INTRAVENOUS at 14:33

## 2022-09-07 RX ADMIN — SODIUM CITRATE AND CITRIC ACID MONOHYDRATE 30 ML: 500; 334 SOLUTION ORAL at 14:57

## 2022-09-07 RX ADMIN — PROPOFOL 20 MG: 10 INJECTION, EMULSION INTRAVENOUS at 17:05

## 2022-09-07 RX ADMIN — ACETAMINOPHEN 975 MG: 325 TABLET, FILM COATED ORAL at 21:10

## 2022-09-07 RX ADMIN — Medication 12 MCG: at 17:19

## 2022-09-07 RX ADMIN — BUPIVACAINE 20 ML: 13.3 INJECTION, SUSPENSION, LIPOSOMAL INFILTRATION at 19:30

## 2022-09-07 RX ADMIN — PROPOFOL 20 MG: 10 INJECTION, EMULSION INTRAVENOUS at 17:02

## 2022-09-07 RX ADMIN — SODIUM CHLORIDE, POTASSIUM CHLORIDE, SODIUM LACTATE AND CALCIUM CHLORIDE: 600; 310; 30; 20 INJECTION, SOLUTION INTRAVENOUS at 15:21

## 2022-09-07 ASSESSMENT — ACTIVITIES OF DAILY LIVING (ADL)
ADLS_ACUITY_SCORE: 35
ADLS_ACUITY_SCORE: 18
ADLS_ACUITY_SCORE: 19
ADLS_ACUITY_SCORE: 23
ADLS_ACUITY_SCORE: 19
ADLS_ACUITY_SCORE: 18

## 2022-09-07 ASSESSMENT — LIFESTYLE VARIABLES: TOBACCO_USE: 1

## 2022-09-07 NOTE — ANESTHESIA PREPROCEDURE EVALUATION
Anesthesia Pre-Procedure Evaluation    Patient: Ruth Goel   MRN: [id number] : 1990        Procedure : Procedure(s):   SECTION          Past Medical History:   Diagnosis Date     Anxiety      Chickenpox      Depression      History of pre-eclampsia in prior pregnancy, currently pregnant      Mild intermittent asthma without complication      morbid obesity      PTSD (post-traumatic stress disorder)      Substance abuse in remission (H)      Tobacco abuse      Uncomplicated asthma     Well controlled     Varicella       Past Surgical History:   Procedure Laterality Date     AS REMOVE TONSILS/ADENOIDS,<15 Y/O        SECTION        SECTION  2010      SECTION  10/03/2012      SECTION      3 Csections     CHOLECYSTECTOMY       LAPAROSCOPIC CHOLECYSTECTOMY  2014     LAPAROSCOPIC CHOLECYSTECTOMY  2016     TONSILLECTOMY       TONSILLECTOMY & ADENOIDECTOMY Bilateral       Allergies   Allergen Reactions     Codeine Anaphylaxis     Ibuprofen      hives     Tramadol Other (See Comments)     Tramadol Unknown     seizures      Social History     Tobacco Use     Smoking status: Current Some Day Smoker     Smokeless tobacco: Never Used     Tobacco comment: 1-2 per day with pregnancy   Substance Use Topics     Alcohol use: Not Currently      Wt Readings from Last 1 Encounters:   22 125.5 kg (276 lb 11.2 oz)        Anesthesia Evaluation   Pt has had prior anesthetic. Type: Regional.    No history of anesthetic complications       ROS/MED HX  ENT/Pulmonary:     (+) tobacco use, Current use, Intermittent, asthma     Neurologic:  - neg neurologic ROS     Cardiovascular: Comment: Hx preeclampsia      METS/Exercise Tolerance:     Hematologic:  - neg hematologic  ROS     Musculoskeletal:  - neg musculoskeletal ROS     GI/Hepatic:     (+) GERD,     Renal/Genitourinary:  - neg Renal ROS     Endo:     (+) Obesity (BMI 50),     Psychiatric/Substance Use: Comment: Hx REINA on methadone     (+) psychiatric history anxiety, depression and other (comment) (PTSD)     Infectious Disease:  - neg infectious disease ROS     Malignancy:  - neg malignancy ROS     Other:      (+) , previous ,         Physical Exam    Airway        Mallampati: II   TM distance: > 3 FB   Neck ROM: full   Mouth opening: > 3 cm    Respiratory Devices and Support         Dental  no notable dental history         Cardiovascular   cardiovascular exam normal          Pulmonary   pulmonary exam normal                OUTSIDE LABS:  CBC:   Lab Results   Component Value Date    WBC 8.0 2022    WBC 7.7 2022    HGB 11.6 (L) 2022    HGB 11.2 (L) 2022    HCT 34.6 (L) 2022    HCT 30.7 (L) 2022     2022     2022     BMP:   Lab Results   Component Value Date     2022    POTASSIUM 3.8 2022    CHLORIDE 107 2022    CO2 23 2022    BUN 3 (L) 2022    CR 0.51 (L) 2022    CR 0.54 2018    GLC 86 2022     COAGS: No results found for: PTT, INR, FIBR  POC: No results found for: BGM, HCG, HCGS  HEPATIC:   Lab Results   Component Value Date    ALBUMIN 3.2 (L) 2022    PROTTOTAL 6.5 2022    ALT 9 2022    AST 12 2022    ALKPHOS 53 2022    BILITOTAL 0.2 2022     OTHER:   Lab Results   Component Value Date    AKASH 9.0 2022    LIPASE <9 2022       Anesthesia Plan    ASA Status:  3   NPO Status:  ELEVATED Aspiration Risk/Unknown    Anesthesia Type: Spinal.   Induction: N/a.   Maintenance: N/A.   Techniques and Equipment:       - Blood: T&S     - Drips/Meds: Phenylephrine     Consents    Anesthesia Plan(s) and associated risks, benefits, and realistic alternatives discussed. Questions answered and patient/representative(s) expressed understanding.     - Discussed: Risks, Benefits and Alternatives for BOTH SEDATION and the PROCEDURE were discussed     - Discussed with:  Patient      - Extended  Intubation/Ventilatory Support Discussed: No.      - Patient is DNR/DNI Status: No    Use of blood products discussed: Yes.     - Discussed with: Patient.     - Consented: consented to blood products            Reason for refusal: other.     Postoperative Care    Pain management: IV analgesics, intrathecal morphine, Peripheral nerve block (Single Shot), Multi-modal analgesia.   PONV prophylaxis: Ondansetron (or other 5HT-3)     Comments:           neg OB ROS.       Jas Aldana MD

## 2022-09-07 NOTE — PROVIDER NOTIFICATION
09/07/22 7625   Provider Notification   Provider Name/Title Dr. Lee   Method of Notification In Department   Request Evaluate in Person   Notification Reason Status Update;Patient Arrived;Decels   Dr. Lee in Dept and reviewed Category II fetal and uterine tracing with intermittent LD's. Patient denies feeling uc's, denies pain. LR bolus given and frequent repositioned to right side. Per MD, will continue to monitor closely and plan to go to OR as soon as Anesthesia team is available.

## 2022-09-07 NOTE — PHARMACY
Methadone Clinic Information Note    Clinic Name: [facility]   Clinic Location: [place]  Phone Number: [phone]    Dose: 130 mg daily  Last Dose: 9/7/22 10:20am  Take Home Doses: 1 dose - due back to clinic 9/9/22    Stephanie Juan, SydniD, BCPPS

## 2022-09-07 NOTE — H&P
History and Physical     Ruth Goel MRN# [id number]   YOB: 1990 Age: 32 year old      Date of Admission: (Not on file)    Primary care provider: David Garza      Assessment and Plan:       32 year old  at 39w1d by LMP c/w 17w0d US, here for scheduled repeat  section. Pregnancy is notable for REINA on methadone maintenance, obesity with BMI > 50, history of CS x3, SULTANA from [place] in [facility] at 28 weeks.     # Scheduled Repeat  Section  Patient has a history of CS x3. Unable to see most recent operative note. Patient reports it was uncomplicated, did not need blood.   - Labs: CBC, T&S, RPR   - Hgb on  is 11.6, platelets 383.   - Placenta: posterior  - Anesthesia: Spinal   - 3g Ancef   - Diet: NPO  - PPx: SCDs, will plan for Lovenox in postpartum period given BMI and surgery. Dosing will be lovenox 40 mg BID.   - Consent: Discussed risks and benefits of procedure, including but not limited to bleeding, infection, injury to surrounding organs, injury to infant, and the potential need for another surgery should some injury go unrecognized or patient were to have continued bleeding. Patient had time to ask questions and expressed understanding of procedure and associated risks. Agreed to blood transfusion if necessary. Consent signed.    #Substance use disorder   Previously issues with methamphetamines. In 2022, she was treated in an intensive outpatient treatment program.   - Continue  mg methadone daily. Will need to verify this dosing with clinic. Release of information signed by patient, will fax to [facility] [phone].   - Will plan to discharge home with Narcan, though will discuss with patient given Methadone maintenance   - Will plan for SW in pp period   - Will consult inpatient pain management team for postoperative pain management plan  - Pain management plan per pre-operative visit:   + Continue home methadone (concentrated liquid 10 mg/mL) -  take 130 mg every morning.  Please verify dose with [facility] [place] before giving and time dose 24 hr after last pre op dose.  + start oxycodone 5-10 mg PO q3hr PRN, if necessary could increase to oxycodone 10-15 mg PO q3hr PRN pain for a short period post op if 10 mg dose not effective and no issues with sedation or respiratory depression.    -- Start hydromorphone IV 0.3-0.5 mg IV every 2 hr PRN breakthrough pain not controlled by PO medication or prior to significant activity (eg. PT/OT sessions).  Would minimize use as much as able to avoid recidivism.      #Tobacco use disorder   Currently smokes less than half a pack a day.  - Nicotine replacement prn while in the hospital     # PNC  - Rh positive, Rubella immune, Glucose tolerance test: 95 mg/dL, GBS neg  - HbA1c 5.1%,  Hepatitis C antibody: Negative   - Pap smear 2022: NILM, HPV positive  - Normal anatomy scan   - Other prenatal labs wnl  - Anemia in pregnancy, improved on PO iron   - Tdap vaccines received. Unclear if received flu, could offer postpartum  - Contraception: to discuss, discussed BTL and declined at that time  - Feeding: to discuss      # FWB:   Cat 2 tracing, minimal variablity, one possible late. Early in monitoring ,may be secondary to methadone vs sleep cycle; cephalic by BSUS; EFW 95%ile from  US  - Continuous Fetal Monitoring  - NICU for delivery   - Intrauterine resuscitative measures prn      Patient discussed with                HPI:     32 year old  at 39w1d by LMP c/w 17w0d US, here for scheduled repeat  section.     Pregnancy notable for:   - BMI > 50   - Opioid use disorder on methadone maintenance   - H/o CS x3  - Anemia, improved with PO iron     Denies history of postpartum hemorrhage, shoulder dystocia, pre-eclampsia. Has mild asthma with rare albuterol use, has a history of pre-eclampsia.     She reports good fetal movement. Denies LOF, vaginal bleeding, or contractions .  She denies fever,  chills, SOB, chest pain, palpitations, N/V, LE swelling/tenderness.  No concerns for headache, vision changes, RUQ or epigastric pain        OB History:    OB History    Para Term  AB Living   4 3 3 0 0 3   SAB IAB Ectopic Multiple Live Births   0 0 0 0 3      # Outcome Date GA Lbr Jos/2nd Weight Sex Delivery Anes PTL Lv   4 Current            3 Term 18 39w0d  3.629 kg (8 lb) M -SEC  N MEME      Complications: Preeclampsia/Hypertension   2 Term 10/03/12   3.771 kg (8 lb 5 oz) M CS-LTranv Spinal N MEME      Name: Boni   1 Term 12/16/10   3.884 kg (8 lb 9 oz) F CS-LTranv Spinal  MEME      Name: Kiara        Prenatal Lab Results:  Lab Results   Component Value Date    AS Negative 2022    HEPBANG Nonreactive 2022    CHPCRT Negative 2022    GCPCRT Negative 2022    HGB 11.6 (L) 2022    HIV Non Reactive 2018       GBS Status:   No results found for: GBS             Past Medical History:     Past Medical History:   Diagnosis Date     Anxiety      Chickenpox      Depression      History of pre-eclampsia in prior pregnancy, currently pregnant      Mild intermittent asthma without complication      morbid obesity      PTSD (post-traumatic stress disorder)      Substance abuse in remission (H)      Tobacco abuse      Uncomplicated asthma     Well controlled     Varicella              Past Surgical History:     Past Surgical History:   Procedure Laterality Date     AS REMOVE TONSILS/ADENOIDS,<11 Y/O        SECTION        SECTION  2010      SECTION  10/03/2012      SECTION      3 Csections     CHOLECYSTECTOMY       LAPAROSCOPIC CHOLECYSTECTOMY  2014     LAPAROSCOPIC CHOLECYSTECTOMY  2016     TONSILLECTOMY       TONSILLECTOMY & ADENOIDECTOMY Bilateral              Social History:     Social History     Tobacco Use     Smoking status: Current Some Day Smoker     Smokeless tobacco: Never Used     Tobacco comment: 1-2 per day  with pregnancy   Substance Use Topics     Alcohol use: Not Currently             Family History:     Family History   Problem Relation Age of Onset     Hypertension Mother      Hypertension Father      Ovarian Cancer Other      Breast Cancer No family hx of      Alcoholism Maternal Grandfather      Cirrhosis Maternal Grandfather      Substance Abuse Paternal Grandfather         drugs             Immunizations:     Immunization History   Administered Date(s) Administered     DTAP (<7y) 09/03/1997     Dtap, 5 Pertussis Antigens (DAPTACEL) 1992, 1992, 1992, 1992, 09/03/1997     FLU 6-35 months 09/21/2010     Flu 65+ Years 11/28/2007, 11/05/2008, 09/29/2009, 09/21/2010, 10/01/2011, 02/23/2012, 09/25/2012     Flu, Unspecified 08/01/2012, 09/01/2012     HPV Quadrivalent 05/25/2007, 07/30/2007, 11/28/2007     HPV9 05/25/2007, 07/30/2007, 11/28/2007     Hep B, Peds or Adolescent 09/03/1997, 08/27/2004, 03/21/2005, 11/05/2008     HepB-Adult 09/03/1997, 08/27/2004, 03/21/2005, 11/05/2008     Hib (PRP-T) 1992, 1992     Hib, Unspecified 1992, 1992     Historical DTP/aP 1992, 1992, 1992, 09/03/1997     Influenza (IIV3) PF 11/28/2007, 11/05/2008, 09/29/2009, 09/21/2010, 10/01/2011, 02/23/2012, 09/25/2012     Influenza Vaccine IM > 6 months Valent IIV4 (Alfuria,Fluzone) 11/28/2007, 11/05/2008, 09/29/2009, 09/21/2010, 10/01/2011, 02/23/2012, 08/01/2012, 09/01/2012, 09/25/2012, 09/23/2014, 09/23/2014, 10/14/2019     MMR 07/06/1993, 08/27/2004     Meningococcal (Menactra ) 11/05/2008     Meningococcal (Menomune ) 11/05/2008     Meningococcal,unspecified 11/05/2008     OPV, trivalent, live 09/03/1997     Pedvax-hib 1992, 1992     Pneumococcal 23 valent 09/23/2014, 09/23/2014     Poliovirus, inactivated (IPV) 1992, 1992, 09/03/1997     Td (Adult), Adsorbed 08/27/2004     Tdap (Adacel,Boostrix) 02/03/2010, 07/18/2018, 04/19/2022             "Allergies:     Allergies   Allergen Reactions     Codeine Anaphylaxis     Ibuprofen      hives     Tramadol Other (See Comments)     Tramadol Unknown     seizures             Medications:     No medications prior to admission.             Review of Systems & Physical Exam:     The Review of Systems is negative other than noted in the HPI      /62 (BP Location: Left arm, Patient Position: Semi-Bryant's, Cuff Size: Adult Large)   Pulse 67   Temp 98.4  F (36.9  C) (Oral)   Resp 18   Ht 1.575 m (5' 2\")   Wt 125.2 kg (276 lb)   LMP 2021   BMI 50.48 kg/m    Gen: NAD, sleepy from her methadone dose  CV: well perfused  Lungs: CTAB, non-labored breathing  Abd: Gravid, non-tender, non-distended  Ext: trace peripheral extremity edema    Presentation: cephalic by BSUS.    FHT:  Monitoring External  FHT: Baseline 115 bpm; minimal variability; no accels present; one possible decelerations   TOCO 0 contractions in 10 minutes         Data:     Comp Ultrasound 2022   GENERAL EVALUATION  ---------------------------------------------------------------------------------------------------------  Cardiac activity present.  bpm.  Fetal movements present.  Presentation cephalic.  Placenta Posterior, No Previa, > 2 cm from internal os.  Umbilical cord 3 vessel cord.  Amniotic fluid Amount of AF: normal amount. MVP 9.4 cm. LUCILA 19.9 cm. Q1 9.9 cm, Q2 4.9 cm, Q3 0.4 cm, Q4 4.7 cm.       Mari Lezama MD MPH  OB/Gyn Resident PGY-2  2022  12:41 PM    Physician Attestation   I, Mari Lee MD, personally examined and evaluated this patient.  I discussed the patient with the resident and care team, and agree with the assessment and plan of care as documented in the note above.   I personally reviewed her past ob and medical history, vital signs, medications, labs, fetal heart tones and toco.  Norman findings: Ruth Goel is a 32 year old  at 39w1d who presents for a scheduled repeat  " section. Pregnancy is complicated by issues listed above. She has allergies to ibuprofen, codeine and tramadol.   She is on methadone 130 mg daily with today's dose taken at 10 AM.  Preoperatively she met with anesthesia and a pharmacist on 22 for a consult.   please see that note for details on planned post op pain control.  Fetal heart rate tracing is overall cat II with occ accels just enough to have a short reactive NST.   There are intermittent 2-3 min decelerations with a baseline of minimal variability.  Of note, patient had methadone 130 mg at 10 AM.  Strip started at ~ 12:15 PM.  Tracing showed improvement in variability after IV fluid bolus.    She is not clearly anshul on toco.    I discussed risks of surgery with patient and discussed possible need for classical or T'd uterine incision if the LST  incision is restricted due to scar tissue, fetal size or both.  We also discussed potential risks to fetus with a possible difficult extraction.  Plan to use panniculus retractor and possible Rafael O retractors to assist with visualization during the case. Informed consent was obtained for  section and blood products.  All questions answered. Of note patient was sleepy during the discussion from her methadone but did not offer any concerns.      Mari Lee MD   2022

## 2022-09-07 NOTE — ANESTHESIA PROCEDURE NOTES
Intrathecal injection Procedure Note    Pre-Procedure   Staff -        Anesthesiologist:  Yasmany Ann MD       Performed By: anesthesiologist       Location: OR       Procedure Start/Stop Times: 9/7/2022 3:30 PM       Pre-Anesthestic Checklist: patient identified, IV checked, risks and benefits discussed, informed consent, monitors and equipment checked, pre-op evaluation, at physician/surgeon's request and post-op pain management  Timeout:       Correct Patient: Yes        Correct Procedure: Yes        Correct Site: Yes        Correct Position: Yes   Procedure Documentation  Procedure: intrathecal injection       Diagnosis: C/S       Patient Position: sitting       Skin prep: Chloraprep       Insertion Site: L2-3. (midline approach).       Needle Gauge: 27.        Needle Length (Inches): 3.5        Spinal Needle Type: QuinckeNo introducer used       # of attempts: 1 and  # of redirects:  0    Assessment/Narrative         Paresthesias: No.       CSF fluid: clear.       Opening pressure was cmH2O while  Sitting.      Medication(s) Administered   0.75% Hyperbaric Bupivacaine (Intrathecal) - Intrathecal   1.6 mL - 9/7/2022 3:46:00 PM  Epinephrine 1000 mcg/mL (Intrathecal) - Intrathecal   0.2 mg - 9/7/2022 3:46:00 PM  Medication Administration Time: 9/7/2022 3:30 PM

## 2022-09-07 NOTE — ANESTHESIA CARE TRANSFER NOTE
Patient: Ruth Goel    Procedure: Procedure(s):   SECTION       Diagnosis: History of  delivery [Z98.891]  Diagnosis Additional Information: No value filed.    Anesthesia Type:   Spinal     Note:    Oropharynx: oropharynx clear of all foreign objects and spontaneously breathing  Level of Consciousness: drowsy  Oxygen Supplementation: room air    Independent Airway: airway patency satisfactory and stable  Dentition: dentition unchanged  Vital Signs Stable: post-procedure vital signs reviewed and stable  Report to RN Given: handoff report given  Patient transferred to: PACU    Handoff Report: Identifed the Patient, Identified the Reponsible Provider, Reviewed the pertinent medical history, Discussed the surgical course, Reviewed Intra-OP anesthesia mangement and issues during anesthesia, Set expectations for post-procedure period and Allowed opportunity for questions and acknowledgement of understanding      Vitals:  Vitals Value Taken Time   BP     Temp     Pulse 61 22 1830   Resp 62 22 1830   SpO2 98 % 22 1830       Electronically Signed By: Jas Aldana MD  2022  6:30 PM

## 2022-09-07 NOTE — PROVIDER NOTIFICATION
09/07/22 1403   Provider Notification   Provider Name/Title Dr. Lee   Method of Notification At Bedside   Request Evaluate in Person   Notification Reason Patient Arrived;Status Update   MD at bedside, MD reviewing Category II fetal tracing moderate variability and occ decelerations, patient denies feeling uc's, denies pain. Per MD may may give oral Tylenol to pt with sips of water.

## 2022-09-07 NOTE — BRIEF OP NOTE
[facility]  Brief Op Note      Ruth Goel   1990  [id number]     Surgery Date:  2022    Surgeon:   Mari Lee MD     Assistants:   - Velma Arango MD   - Mari Lezama MD MPH PGY-2    Pre-op Diagnosis:   - Intrauterine pregnancy at 39w1d  - History of  section x3  - Opioid use disorder on methadone maintenance   - Tobacco use disorder   - Morbid obesity     Post-op Diagnosis:   - Same, s/p procedure    Procedure: Repeat  section through pfannenstiel skin incision and a high transverse uterine incision with double layer uterine closure     Anesthesia: Spinal     EBL: 1115 mL  IVF:  2000 mL crystalloid  UOP: 250 mL clear urine at the end of the case  Drains: Newberry Catheter   Specimens: None    Complications: None apparent    Indications:   Ruth Goel is a 32 year old  at 39w1d admitted for scheduled repeat  section. The risks, benefits, and alternatives of  section were discussed with the patient, and she agreed to proceed.     Findings:   1. Clear amniotic fluid  2. Liveborn male infant in NILAY presentation. Apgars 5, 6, and 8 at 1 minute, 5 minutes and 10 minutes. Weight 3670 g (8 lb 1.5 oz).   3. Dense fascial adhesions, bladder and fascia densely adherent to lower uterine segment, obliterating access to the lower uterine segment and bladder flap.   4. Left anterior extension of uterine incision into uterine serosa  5. Normal fallopian tubes, and ovaries.   6. Four minute extraction of infant due to difficulty extracting the head from a high uterine incision and the fetal head  deep in the pelvis.   7. Due to high transverse incision on uterus, recommend any repeat cesaeran sections be performed at 36 weeks. Recommend considering a vertical midline incision on the uterus.     Mari Lezama MD MPH  OB/Gyn Resident PGY-2  2022  5:55 PM    Dictated by Mari Lee    Job ID # [id number]

## 2022-09-07 NOTE — DISCHARGE SUMMARY
[facility] Discharge Summary    Ruth Goel MRN# [id number]   Age: 32 year old YOB: 1990     Date of Admission:  2022  Date of Discharge:  22  Admitting Physician:  Mari Lee MD  Discharge Physician:  Caron Nguyen MD             Admission Diagnoses:   32 year old  at 39w1d by LMP c/w 17w0d US, here for scheduled repeat low transverse  section. SULTANA from [facility] in [facility] at 28 weeks.    Pregnancy is notable for:   - Substance Use disorder  - Obesity with BMI>50  - Hx CS x3          Discharge Diagnosis:   - Same, Delivered  - Acute blood loss anemia.   - Postpartum Hemorrhage           Procedures:     Procedure(s): Repeat high tranverse  section with double layer closure via Pfannenstiel skin incision            Medications Prior to Admission:     Medications Prior to Admission   Medication Sig Dispense Refill Last Dose     albuterol (PROAIR HFA;PROVENTIL HFA;VENTOLIN HFA) 90 mcg/actuation inhaler [ALBUTEROL (PROAIR HFA;PROVENTIL HFA;VENTOLIN HFA) 90 MCG/ACTUATION INHALER] Inhale 1-2 puffs every 6 (six) hours as needed for wheezing. 1 Inhaler 0 More than a month at Unknown time     cholecalciferol 25 MCG (1000 UT) TABS Take 25 mcg by mouth every evening   2022 at Unknown time     cloNIDine (CATAPRES) 0.1 MG tablet Take 0.1 mg by mouth 2 times daily as needed (anxiety)   Past Month at Unknown time     FEROSUL 325 (65 Fe) MG tablet Take 325 mg by mouth every evening   2022 at Unknown time     hydrOXYzine (VISTARIL) 25 MG capsule Take 25 mg by mouth 3 times daily as needed for anxiety   Past Month at Unknown time     melatonin 3 MG CAPS Take 3 mg by mouth nightly as needed   Past Month at Unknown time     methadone (DOLOPHINE-INTENSOL) 10 MG/ML (HIGH CONC) solution Take 130 mg by mouth daily   2022 at 0930     Prenatal Vit-Fe Fumarate-FA (PRENATAL VITAMIN PLUS LOW IRON) 27-1 MG TABS Take 1 tablet by mouth every evening   2022 at Unknown time      SM ANTACID 500 MG chewable tablet Take 1-2 chew tab by mouth daily as needed   Past Month at Unknown time     vitamin C (ASCORBIC ACID) 500 MG tablet Take 500 mg by mouth every evening   9/6/2022 at Unknown time     naloxone (NARCAN) 4 MG/0.1ML nasal spray As Needed (Patient not taking: Reported on 8/30/2022)        SENNA-TIME 8.6 MG tablet Take 1 tablet by mouth daily as needed        [DISCONTINUED] acetaminophen (TYLENOL) 500 MG tablet Take 500-1,000 mg by mouth every 8 hours as needed        [DISCONTINUED] aspirin (ASA) 81 MG EC tablet Take 81 mg by mouth every evening   9/6/2022 at Unknown time             Discharge Medications:        Review of your medicines      START taking      Dose / Directions   cyclobenzaprine 10 MG tablet  Commonly known as: FLEXERIL      Dose: 10 mg  Take 1 tablet (10 mg) by mouth 3 times daily for 15 days  Quantity: 45 tablet  Refills: 0     gabapentin 100 MG capsule  Commonly known as: NEURONTIN      Dose: 100 mg  Take 1 capsule (100 mg) by mouth 3 times daily for 15 days  Quantity: 45 capsule  Refills: 0     Lidocaine 4 % Patch  Commonly known as: LIDOCARE      Dose: 2 patch  Place 2 patches onto the skin every 24 hours To prevent lidocaine toxicity, patient should be patch free for 12 hrs daily.  Quantity: 14 patch  Refills: 1     oxyCODONE 5 MG tablet  Commonly known as: ROXICODONE      Dose: 5-10 mg  Take 1-2 tablets (5-10 mg) by mouth every 6 hours as needed for pain  Quantity: 24 tablet  Refills: 0     senna-docusate 8.6-50 MG tablet  Commonly known as: SENOKOT-S/PERICOLACE      Dose: 1 tablet  Take 1 tablet by mouth daily Start after delivery.  Quantity: 100 tablet  Refills: 0        CONTINUE these medicines which may have CHANGED, or have new prescriptions. If we are uncertain of the size of tablets/capsules you have at home, strength may be listed as something that might have changed.      Dose / Directions   acetaminophen 325 MG tablet  Commonly known as: TYLENOL  This  may have changed:     medication strength    how much to take    when to take this    reasons to take this    additional instructions      Dose: 650 mg  Take 2 tablets (650 mg) by mouth every 6 hours as needed for mild pain Start after Delivery.  Quantity: 100 tablet  Refills: 0        CONTINUE these medicines which have NOT CHANGED      Dose / Directions   albuterol 108 (90 Base) MCG/ACT inhaler  Commonly known as: PROAIR HFA/PROVENTIL HFA/VENTOLIN HFA      Dose: 1-2 puff  [ALBUTEROL (PROAIR HFA;PROVENTIL HFA;VENTOLIN HFA) 90 MCG/ACTUATION INHALER] Inhale 1-2 puffs every 6 (six) hours as needed for wheezing.  Quantity: 1 Inhaler  Refills: 0     cholecalciferol 25 MCG (1000 UT) Tabs      Dose: 25 mcg  Take 25 mcg by mouth every evening  Refills: 0     cloNIDine 0.1 MG tablet  Commonly known as: CATAPRES      Dose: 0.1 mg  Take 0.1 mg by mouth 2 times daily as needed (anxiety)  Refills: 0     FeroSul 325 (65 Fe) MG tablet  Generic drug: ferrous sulfate      Dose: 325 mg  Take 325 mg by mouth every evening  Refills: 0     hydrOXYzine 25 MG capsule  Commonly known as: VISTARIL      Dose: 25 mg  Take 25 mg by mouth 3 times daily as needed for anxiety  Refills: 0     melatonin 3 MG Caps      Dose: 3 mg  Take 3 mg by mouth nightly as needed  Refills: 0     methadone 10 MG/ML (HIGH CONC) solution  Commonly known as: DOLOPHINE-INTENSOL      Dose: 130 mg  Take 130 mg by mouth daily  Refills: 0     naloxone 4 MG/0.1ML nasal spray  Commonly known as: NARCAN      As Needed  Refills: 0     Prenatal Vitamin Plus Low Iron 27-1 MG Tabs      Dose: 1 tablet  Take 1 tablet by mouth every evening  Refills: 0     Senna-Time 8.6 MG tablet  Generic drug: senna      Dose: 1 tablet  Take 1 tablet by mouth daily as needed  Refills: 0     SM Antacid 500 MG chewable tablet  Generic drug: calcium carbonate      Dose: 1-2 chew tab  Take 1-2 chew tab by mouth daily as needed  Refills: 0     vitamin C 500 MG tablet  Commonly known as: ASCORBIC  ACID      Dose: 500 mg  Take 500 mg by mouth every evening  Refills: 0        STOP taking    aspirin 81 MG EC tablet  Commonly known as: ASA              Where to get your medicines      These medications were sent to [place], [place] - [address]  [address] [facility], [facility] [address]    Phone: [phone]     acetaminophen 325 MG tablet    cyclobenzaprine 10 MG tablet    gabapentin 100 MG capsule    Lidocaine 4 % Patch    oxyCODONE 5 MG tablet    senna-docusate 8.6-50 MG tablet            Consultations:   Anesthesia  Social work          Brief Admission History:   Ms. Ruth Goel is a 32 year old now  who initially presented at 39w1d for scheduled low transverse .       Intraoperative course   EBL 1115, given TXA intraoperatively     Findings:   1. Clear amniotic fluid  2. Liveborn male infant in NILAY presentation. Apgars 5, 6, and 8 at 1 minute, 5 minutes and 10 minutes. Weight 3670 g (8 lb 1.5 oz).   3. Dense fascial adhesions, bladder and fascia densely adherent to lower uterine segment, obliterating access to the lower uterine segment and bladder flap.   4. Left anterior extension of uterine incision into uterine serosa  5. Normal fallopian tubes, and ovaries.   6. Four minute extraction of infant   7. Due to high transverse incision on uterus, recommend any repeat cesaeran sections be performed at 36 weeks. Recommend a vertical midline incision on the uterus.          Postpartum Course   The patient's hospital course was unremarkable.  She recovered as anticipated and experienced no post-operative complications. She experienced increased pain and difficulty with pain control though this was managed well with appropriately augmented pain regimen including the addition of flexeril and gabapentin. On POD#3 she stopped taking oxycodone due to recommendations from NICU to limit breast milk use if oxycodone has been used. On POD#4 this was clarified that  breast milk could be stored and labeled with recent oxycodone use for dilution at a future date. The Infant Risk hotline will be contacted by the NICU on Monday for guidance on resuming maternal breast milk use.  On discharge, her pain was controlled. Vaginal bleeding is similar to peak menstrual flow.  Voiding without difficulty.  Ambulating well and tolerating a normal diet.  No fever or significant wound drainage.  Pumping well.  Infant is stable.  No bowel movement yet.  She was discharged on post-partum day #4. Inf[facility] will be transferred [facility] to [facility] on POD#4. Ruth will be discharged with iron for acute blood loss anemia.     Post-partum hemoglobin:   Hemoglobin   Date Value Ref Range Status   09/08/2022 9.3 (L) 11.7 - 15.7 g/dL Final   05/14/2018 11.0 (L) 11.7 - 15.7 g/dL Final             Discharge Instructions and Follow-Up:     Discharge diet: Regular   Discharge activity: No lifting greater than 20 lbs, pushing, pulling, or other strenuous activity for 6 weeks. Pelvic rest for 6 weeks including no sexual intercourse, tampons, or douching. No driving until you can slam on the brakes without pain or while on narcotic pain medications.    Discharge follow-up: Follow up with primary OB for routine postpartum visit in 6 weeks  Contact clinic for virtual appointment if refills of oxycodone will be needed. Do E-visit  or telephone visit with at least 24 hours advance notice.  Follow up in 2 weeks for a postpartum incision check   Wound care: Keep incision clean and dry           Discharge Disposition:     Discharged to home in stable condition      Hawa Mcgill MD  PGY-1 OBGYN Resident  9/11/2022 12:37 PM        Physician Attestation   I saw and evaluated this patient prior to discharge.  I discussed the patient with the resident/fellow and agree with plan of care as documented in the note.      I personally reviewed vital signs, medications, labs and exam.    I personally spent 30 minutes  on discharge activities.    Caron Nguyen MD  Date of Service (when I saw the patient): 09/11/22

## 2022-09-07 NOTE — PROVIDER NOTIFICATION
22 1211   Provider Notification   Provider Name/Title Dr. Lezama   Method of Notification At Bedside   Request Evaluate in Person   Notification Reason Patient Arrived;Status Update;Lab/Diagnostic Study   Data: Patient admitted to room Triage-1 at 1145. Patient is a . Prenatal record reviewed.   OB History    Para Term  AB Living   4 3 3 0 0 3   SAB IAB Ectopic Multiple Live Births   0 0 0 0 3      # Outcome Date GA Lbr Jos/2nd Weight Sex Delivery Anes PTL Lv   4 Current            3 Term 18 39w0d  3.629 kg (8 lb) M -SEC  N MEME      Complications: Preeclampsia/Hypertension   2 Term 10/03/12   3.771 kg (8 lb 5 oz) M CS-LTranv Spinal N MEME      Name: Boni   1 Term 12/16/10   3.884 kg (8 lb 9 oz) F CS-LTranv Spinal  MEME      Name: Kiara   .  Medical History:   Past Medical History:   Diagnosis Date    Anxiety     Chickenpox     Depression     History of pre-eclampsia in prior pregnancy, currently pregnant     Mild intermittent asthma without complication     morbid obesity     PTSD (post-traumatic stress disorder)     Substance abuse in remission (H)     Tobacco abuse     Uncomplicated asthma     Well controlled    Varicella    .  Gestational age 39w1d. Vital signs per doc flowsheet. Fetal movement present. Patient reports Scheduled  Section   as reason for admission. Support person Boni present.  Action: Verbal consent for EFM, external fetal monitors applied. Admission assessment completed. Patient and support persons educated on preoperative and intraoperative  process. Patient instructed to report change in fetal movement, contractions, vaginal leaking of fluid or bleeding, abdominal pain, or any concerns related to the pregnancy to her nurse/physician. Patient oriented to room, call light in reach.   Response: Dr. Lezama at bedside and informed of above. MD performing Bedside US for fetal presentation: Cephalic. MD reviewing fetal and uterine tracing,  minimal variability, changing to moderate, LD X1, patient repositioned and Peds Vascular Access Team paged to start an IV. Plan per provider is to continue to monitor closely before scheduled C/S. Pt consented onsent for surgery. Patient verbalized understanding of education and verbalized agreement with plan. Patient denies pain currently.

## 2022-09-08 LAB — HGB BLD-MCNC: 9.3 G/DL (ref 11.7–15.7)

## 2022-09-08 PROCEDURE — 36415 COLL VENOUS BLD VENIPUNCTURE: CPT | Performed by: STUDENT IN AN ORGANIZED HEALTH CARE EDUCATION/TRAINING PROGRAM

## 2022-09-08 PROCEDURE — 250N000011 HC RX IP 250 OP 636: Performed by: STUDENT IN AN ORGANIZED HEALTH CARE EDUCATION/TRAINING PROGRAM

## 2022-09-08 PROCEDURE — 85018 HEMOGLOBIN: CPT | Performed by: STUDENT IN AN ORGANIZED HEALTH CARE EDUCATION/TRAINING PROGRAM

## 2022-09-08 PROCEDURE — 250N000013 HC RX MED GY IP 250 OP 250 PS 637: Performed by: STUDENT IN AN ORGANIZED HEALTH CARE EDUCATION/TRAINING PROGRAM

## 2022-09-08 PROCEDURE — 120N000002 HC R&B MED SURG/OB UMMC

## 2022-09-08 RX ORDER — NAPROXEN 250 MG/1
250-500 TABLET ORAL 2 TIMES DAILY WITH MEALS
Status: DISCONTINUED | OUTPATIENT
Start: 2022-09-08 | End: 2022-09-11 | Stop reason: HOSPADM

## 2022-09-08 RX ORDER — KETOROLAC TROMETHAMINE 15 MG/ML
15 INJECTION, SOLUTION INTRAMUSCULAR; INTRAVENOUS EVERY 6 HOURS PRN
Status: DISCONTINUED | OUTPATIENT
Start: 2022-09-08 | End: 2022-09-09

## 2022-09-08 RX ADMIN — ENOXAPARIN SODIUM 60 MG: 60 INJECTION SUBCUTANEOUS at 05:44

## 2022-09-08 RX ADMIN — OXYCODONE HYDROCHLORIDE 5 MG: 5 TABLET ORAL at 18:37

## 2022-09-08 RX ADMIN — CYCLOBENZAPRINE HYDROCHLORIDE 10 MG: 5 TABLET, FILM COATED ORAL at 08:41

## 2022-09-08 RX ADMIN — ACETAMINOPHEN 975 MG: 325 TABLET, FILM COATED ORAL at 22:57

## 2022-09-08 RX ADMIN — CYCLOBENZAPRINE HYDROCHLORIDE 10 MG: 5 TABLET, FILM COATED ORAL at 13:42

## 2022-09-08 RX ADMIN — SENNOSIDES AND DOCUSATE SODIUM 1 TABLET: 8.6; 5 TABLET ORAL at 21:17

## 2022-09-08 RX ADMIN — ACETAMINOPHEN 975 MG: 325 TABLET, FILM COATED ORAL at 08:41

## 2022-09-08 RX ADMIN — OXYCODONE HYDROCHLORIDE 15 MG: 5 TABLET ORAL at 13:42

## 2022-09-08 RX ADMIN — METHADONE HYDROCHLORIDE 130 MG: 10 CONCENTRATE ORAL at 09:54

## 2022-09-08 RX ADMIN — GABAPENTIN 100 MG: 100 CAPSULE ORAL at 21:17

## 2022-09-08 RX ADMIN — ACETAMINOPHEN 975 MG: 325 TABLET, FILM COATED ORAL at 03:04

## 2022-09-08 RX ADMIN — ENOXAPARIN SODIUM 60 MG: 60 INJECTION SUBCUTANEOUS at 18:28

## 2022-09-08 RX ADMIN — GABAPENTIN 100 MG: 100 CAPSULE ORAL at 07:05

## 2022-09-08 RX ADMIN — OXYCODONE HYDROCHLORIDE 10 MG: 5 TABLET ORAL at 03:04

## 2022-09-08 RX ADMIN — SENNOSIDES AND DOCUSATE SODIUM 2 TABLET: 8.6; 5 TABLET ORAL at 08:41

## 2022-09-08 RX ADMIN — ACETAMINOPHEN 975 MG: 325 TABLET, FILM COATED ORAL at 17:11

## 2022-09-08 RX ADMIN — GABAPENTIN 100 MG: 100 CAPSULE ORAL at 13:42

## 2022-09-08 RX ADMIN — OXYCODONE HYDROCHLORIDE 15 MG: 5 TABLET ORAL at 22:57

## 2022-09-08 RX ADMIN — OXYCODONE HYDROCHLORIDE 10 MG: 5 TABLET ORAL at 18:28

## 2022-09-08 RX ADMIN — OXYCODONE HYDROCHLORIDE 10 MG: 5 TABLET ORAL at 08:45

## 2022-09-08 RX ADMIN — CYCLOBENZAPRINE HYDROCHLORIDE 10 MG: 5 TABLET, FILM COATED ORAL at 21:17

## 2022-09-08 ASSESSMENT — ACTIVITIES OF DAILY LIVING (ADL)
ADLS_ACUITY_SCORE: 23

## 2022-09-08 NOTE — OP NOTE
[facility] Operative Report   Section    Ruth LESLIE Love  1990  [id number]     Procedure Date: 2022    PREOPERATIVE DIAGNOSES:  1.  Intrauterine pregnancy at 39 weeks and 1 day.  2.  History of 3 prior  sections.  3.  Opioid use disorder, on methadone maintenance.  4.  Morbid obesity.    POSTOPERATIVE DIAGNOSES:  1.  Intrauterine pregnancy at 39 weeks and 1 day.  2.  History of 3 prior  sections.  3.  Opioid use disorder, on methadone maintenance.  4.  Morbid obesity.    5.  Delivered.    PROCEDURE PERFORMED:  Repeat  section through a Pfannenstiel skin incision and a high transverse uterine incision with a double-layer uterine closure.    SURGEONS:  Mari Lee MD     ASSISTANTS:    MD Mari Hays MD MPH    ANESTHESIA:  Spinal.    INDICATIONS FOR PROCEDURE:  Ruth is a 32-year-old G4, P3-0-0-3 at 39 weeks and 1 day, who presented to Labor and Delivery for a scheduled repeat  section.  Pregnancy is complicated by 3 prior  sections, morbid obesity and opioid use disorder.  The patient has declined a postpartum tubal ligation.  The risks of the procedure were discussed with the patient in detail and informed consent obtained.    OPERATIVE FINDINGS:  At the time of delivery, dense fascial adhesions were noted.  The bladder and fascia were densely adherent to the lower uterine segment, obliterating access to the lower uterine segment and the bladder flap.  At the time of delivery, there was a left anterior extension of the uterine incision into the uterine serosa.  There was clear amniotic fluid.  Live born male infant delivered from right occiput anterior rotated to transverse with Apgars of 5, 6, and 8 at 1, 5 and 10 minutes respectively.  Weight was 8 pounds 1.5 ounces.  The fallopian tubes and ovaries appeared normal.  There was a 4-minute extraction of the infant due to difficulty extracting the head from a high uterine incision  with the fetal head deep in the pelvis.  Due to a high transverse incision on the uterus, recommended any repeat  section to be performed at 36 weeks.  For any future deliveries, recommend considering a vertical midline incision on the uterus.    OPERATIVE PROCEDURE IN DETAIL:  After informed consent was obtained from the patient, she was taken to the operating room where a spinal anesthetic was performed.  A brief timeout was held to verify correct patient and procedure.  She was placed in the left lateral tilt position and prepped and draped in the usual sterile fashion.  After a test of adequate anesthesia, a Pfannenstiel skin incision was made a centimeter above the previous incisions and carried down sharply through the subcutaneous tissue.  The fascia was incised horizontally and the rectus abdominis muscles bluntly and sharply dissected away from the rectus muscle superiorly.  Inferiorly the fascia was densely adherent to the bladder and to the lower uterine segment with minimal ability for dissection.  An attempt to create a bladder flap was unsuccessful due to the adhesions obliterating any visualization of a potential bladder flap.  At that point the decision was made to proceed with a high transverse incision on the uterus.  This was made without difficulty and extended laterally bluntly and with the bandage scissors.  An attempt to extract the fetal head was difficult due to the high incision on the uterus and the fetal head which was deep down in the pelvis.  The left arm delivered through the incision first.  After approximately 4 minutes the head was extracted and the shoulders rotated to transverse to deliver the baby through the incision.  The baby was handed off to the NICU team in attendance with findings as noted above.  The placenta was then delivered and the uterine cavity wiped free of remaining clot and membrane.  The uterus was exteriorized.  The uterine incision inspected and an  extension on the left anterior aspect of the uterine serosa was noted.  The uterine incision was closed with a running locked 0 Monocryl suture.  The extension was sewn over as well with 0 Monocryl running locked suture.  A second layer of running locked 0 Monocryl suture was placed over the first layer to reinforce it.  Imbrication was not possible due to the nature of the uterine tissue.  Hemostasis was noted at the conclusion of placing the second layer.  The abdomen and pelvis were inspected and hemostasis achieved using electrocautery.  Interceed was placed over the anterior surface of the uterus.  The fascia was then closed with a running 0 PDS suture.  Subcutaneous tissue was copiously irrigated and hemostasis achieved using electrocautery.  The subcutaneous tissue was closed with 2 layers of 2-0 plain suture.  The skin edges were reapproximated using 4-0 Monocryl suture.  The patient tolerated the procedure well.  She had intermittent episodes of pain, which the Anesthesia Team controlled with additional IV medication.  She did require an art line placement, immediately after the spinal due to hypotension.  She required fluid resuscitation and pressors for a temporary drop in blood pressure.  This was temporary and resolved quickly with fluid and pressors.  The baby and mother were stable at the conclusion of the case and taken to the recovery room in satisfactory condition.  The estimated blood loss was 1115 mL.  Due to the extreme difficulty of this  section because of obesity, and prior  sections, it was necessary for Dr Velma Arango to scrub in as a skilled assistant for retraction and assistance with extraction of the fetal head.     INTRAVENOUS FLUIDS:  2000 mL of crystalloid.    URINE OUTPUT:  250 mL of clear urine at the end of the case.  Newberry catheter was left in place.      Mari Lee MD        D: 2022   T: 2022   MT: [id number]    Name:     JOSIAH MCCARTHY  MRN:       [id number]        Account:        [id number]   :      1990           Procedure Date: 2022     Document: [id number]

## 2022-09-08 NOTE — CARE PLAN
Data: Ruth Goel transferred to 7125 via wheelchair at 2005. Baby transferred via crib.  Action: Receiving unit notified of transfer: Yes. Patient and family notified of room change. Report given to Brendan at 2007. Belongings sent to receiving unit. Accompanied by Registered Nurse. Oriented patient to surroundings. Call light within reach. ID bands double-checked with receiving RN.  Response: Patient tolerated transfer and is stable.

## 2022-09-08 NOTE — ANESTHESIA POSTPROCEDURE EVALUATION
Patient: Ruth Goel    Procedure: Procedure(s):   SECTION       Anesthesia Type:  Spinal    Note:  Disposition: Inpatient   Postop Pain Control: Uneventful            Sign Out: Well controlled pain   PONV: No   Neuro/Psych: Uneventful            Sign Out: Acceptable/Baseline neuro status   Airway/Respiratory: Uneventful            Sign Out: Acceptable/Baseline resp. status   CV/Hemodynamics: Uneventful            Sign Out: Acceptable CV status; No obvious hypovolemia; No obvious fluid overload   Other NRE: NONE   DID A NON-ROUTINE EVENT OCCUR? No           Last vitals:  Vitals Value Taken Time   BP 98/87 22   Temp 36.8  C (98.2  F) 22   Pulse 52 22   Resp 0 22   SpO2 100 % 22   Vitals shown include unvalidated device data.    Electronically Signed By: Avel Hamm MD  2022  8:09 PM

## 2022-09-08 NOTE — PLAN OF CARE
Patient arrived to [facility] unit via zoom with belongings, accompanied by [facility] Got report from CASH Shafer and checked bands. Unit and room oriented complete. No concerns at this time. Continue with plan of care.

## 2022-09-08 NOTE — PROGRESS NOTES
" Update    I spoke with patient and her partner again this afternoon about findings at time of  section yesterday, and discussed my recommendation regarding future pregnancies.  I reviewed the findings at the time of her  section including the dense scar tissue which obliterated the lower uterine segment.  I discussed that the incision we made was a high transverse incision into the anterior uterine wall.  This means that any future pregnancy should be delivered at 36 weeks to prevent uterine rupture at the previous scar.  I used a diagram to discuss where the normal incision is and where we made her incision.  I also discussed that with every subsequent  section there are more risks involved to the mother and the baby.  Discussed that her  yesterday required a 4-minute fetal extraction due to the high incision.  For any subsequent pregnancy the uterine incision will likely need to be high transverse or classical incision.  During her  we were not able to take down the adhesions of the lower uterine segment due to the dense nature.  I discussed that the safest thing would be to avoid a future pregnancy.  Ruth was offered a tubal ligation during her prenatal care and she declined.  The patient has declined a post partum tubal ligation on several occasions including yesterday morning.  Her partner said he did not understand why when she was \"doped up\" they asked her if she wanted a tubal ligation.  The patient answered that she had stated on occasions in the clinic prior to yesterday that she had told the doctors she did not want a tubal ligation.  Mari Lee MD   2022   "

## 2022-09-08 NOTE — INTERVAL H&P NOTE
"I have reviewed the surgical (or preoperative) H&P that is linked to this encounter, and examined the patient. There are no significant changes    Clinical Conditions Present on Arrival:  Clinically Significant Risk Factors Present on Admission                   # Severe Obesity: Estimated body mass index is 50.48 kg/m  as calculated from the following:    Height as of this encounter: 1.575 m (5' 2\").    Weight as of this encounter: 125.2 kg (276 lb).       "

## 2022-09-08 NOTE — ANESTHESIA PROCEDURE NOTES
Other (Quadratus Lumborum) Procedure Note    Pre-Procedure   Staff -        Anesthesiologist:  Yasmany Ann MD       Resident/Fellow: Jas Aldana MD       Performed By: resident and with residents       Procedure performed by resident/fellow/CRNA in presence of a teaching physician.         Location: pre-op       Pre-Anesthestic Checklist: patient identified, IV checked, site marked, risks and benefits discussed, informed consent, monitors and equipment checked, pre-op evaluation, at physician/surgeon's request and post-op pain management  Timeout:       Correct Patient: Yes        Correct Procedure: Yes        Correct Site: Yes        Correct Position: Yes        Correct Laterality: Yes        Site Marked: Yes  Procedure Documentation  Procedure: Other (Quadratus Lumborum)       Diagnosis: POSTOP PAIN       Laterality: bilateral       Patient Position: supine       Patient Prep/Sterile Barriers: sterile gloves, mask       Skin prep: Chloraprep       Needle Gauge: 21.        Needle Length (millimeters): 110        Ultrasound guided       1. Ultrasound was used to identify targeted nerve, plexus, vascular marker, or fascial plane and place a needle adjacent to it in real-time.       2. Ultrasound was used to visualize the spread of anesthetic in close proximity to the above referenced structure.       3. A permanent image is entered into the patient's record.    Assessment/Narrative         The placement was negative for: blood aspirated, painful injection and site bleeding       Paresthesias: No.       Bolus given via needle. no blood aspirated via catheter.        Secured via.        Insertion/Infusion Method: Single Shot       Complications: none    Medication(s) Administered   Bupivacaine 0.25% PF (Infiltration) - Infiltration   20 mL - 9/7/2022 7:30:00 PM  Bupivacaine liposome (Exparel) 1.3% LA inj susp (Infiltration) - Infiltration   20 mL - 9/7/2022 7:30:00 PM   Comments:  Routine QL block  without complications. Patient tolerated well.

## 2022-09-08 NOTE — PLAN OF CARE
Goal Outcome Evaluation:  Data: Vital signs within normal limits. Postpartum checks within normal limits - see flow record. Patient eating and drinking normally. Newberry in until 0800 this morning. No apparent signs of infection.  Patient performing self cares and is able to care for infant.  Action: Patient medicated during the shift for pain and cramping. See MAR. Patient reassessed within 1 hour after each medication and pain was improved - Patient education done about plan of care. See flow record.  Response: Positive attachment behaviors observed with infant. Support person, , Boni, present.   Plan: Anticipate discharge on .  Plan of Care Reviewed With: patient     Overall Patient Progress: improving

## 2022-09-08 NOTE — PLAN OF CARE
Goal Outcome Evaluation:      Data: VSS and postpartum checks WNL. Patient eating and drinking normally. Patient able to empty bladder independently and up ambulating. Patient performing self care and able to care for infant.Fundus at U and firm  without massage.  lochia scant and  no blood clots. Dressing clean,dry and intact.   Action: Patient taking Oxycodone, Tylenol and Flexeril for pain and pain tolerable. Encouraged patient to breast  feed every 2 - 3 hours and to monitor for cues to feed infant. Patient education done( education record). Newberry removed as per MD order.   Response: Patient participating in infant's care. Positive attachment with infant observed.. Support/ spouse present at bedside and attentive to infant and patient.   Plan: Continue with the plan of cares.

## 2022-09-09 PROCEDURE — 120N000002 HC R&B MED SURG/OB UMMC

## 2022-09-09 PROCEDURE — 250N000011 HC RX IP 250 OP 636: Performed by: STUDENT IN AN ORGANIZED HEALTH CARE EDUCATION/TRAINING PROGRAM

## 2022-09-09 PROCEDURE — 250N000013 HC RX MED GY IP 250 OP 250 PS 637: Performed by: STUDENT IN AN ORGANIZED HEALTH CARE EDUCATION/TRAINING PROGRAM

## 2022-09-09 RX ORDER — KETOROLAC TROMETHAMINE 15 MG/ML
15 INJECTION, SOLUTION INTRAMUSCULAR; INTRAVENOUS EVERY 6 HOURS
Status: COMPLETED | OUTPATIENT
Start: 2022-09-09 | End: 2022-09-10

## 2022-09-09 RX ORDER — OXYCODONE HYDROCHLORIDE 5 MG/1
10-15 TABLET ORAL
Status: DISCONTINUED | OUTPATIENT
Start: 2022-09-09 | End: 2022-09-11 | Stop reason: HOSPADM

## 2022-09-09 RX ADMIN — GABAPENTIN 100 MG: 100 CAPSULE ORAL at 14:22

## 2022-09-09 RX ADMIN — ACETAMINOPHEN 975 MG: 325 TABLET, FILM COATED ORAL at 20:11

## 2022-09-09 RX ADMIN — KETOROLAC TROMETHAMINE 15 MG: 15 INJECTION, SOLUTION INTRAMUSCULAR; INTRAVENOUS at 14:22

## 2022-09-09 RX ADMIN — SENNOSIDES AND DOCUSATE SODIUM 2 TABLET: 8.6; 5 TABLET ORAL at 08:05

## 2022-09-09 RX ADMIN — OXYCODONE HYDROCHLORIDE 15 MG: 5 TABLET ORAL at 20:20

## 2022-09-09 RX ADMIN — OXYCODONE HYDROCHLORIDE 15 MG: 5 TABLET ORAL at 03:20

## 2022-09-09 RX ADMIN — ENOXAPARIN SODIUM 60 MG: 60 INJECTION SUBCUTANEOUS at 18:42

## 2022-09-09 RX ADMIN — ENOXAPARIN SODIUM 60 MG: 60 INJECTION SUBCUTANEOUS at 05:52

## 2022-09-09 RX ADMIN — METHADONE HYDROCHLORIDE 130 MG: 10 CONCENTRATE ORAL at 08:40

## 2022-09-09 RX ADMIN — KETOROLAC TROMETHAMINE 15 MG: 15 INJECTION, SOLUTION INTRAMUSCULAR; INTRAVENOUS at 08:05

## 2022-09-09 RX ADMIN — ACETAMINOPHEN 975 MG: 325 TABLET, FILM COATED ORAL at 05:52

## 2022-09-09 RX ADMIN — GABAPENTIN 100 MG: 100 CAPSULE ORAL at 08:05

## 2022-09-09 RX ADMIN — GABAPENTIN 100 MG: 100 CAPSULE ORAL at 20:12

## 2022-09-09 RX ADMIN — SENNOSIDES AND DOCUSATE SODIUM 2 TABLET: 8.6; 5 TABLET ORAL at 20:12

## 2022-09-09 RX ADMIN — ACETAMINOPHEN 975 MG: 325 TABLET, FILM COATED ORAL at 14:22

## 2022-09-09 RX ADMIN — CYCLOBENZAPRINE HYDROCHLORIDE 10 MG: 5 TABLET, FILM COATED ORAL at 14:22

## 2022-09-09 RX ADMIN — KETOROLAC TROMETHAMINE 15 MG: 15 INJECTION, SOLUTION INTRAMUSCULAR; INTRAVENOUS at 20:12

## 2022-09-09 RX ADMIN — CYCLOBENZAPRINE HYDROCHLORIDE 10 MG: 5 TABLET, FILM COATED ORAL at 08:40

## 2022-09-09 RX ADMIN — CYCLOBENZAPRINE HYDROCHLORIDE 10 MG: 5 TABLET, FILM COATED ORAL at 20:12

## 2022-09-09 RX ADMIN — OXYCODONE HYDROCHLORIDE 15 MG: 5 TABLET ORAL at 10:33

## 2022-09-09 ASSESSMENT — ACTIVITIES OF DAILY LIVING (ADL)
ADLS_ACUITY_SCORE: 23

## 2022-09-09 NOTE — LACTATION NOTE
"This note was copied from a baby's chart.  Consult for: Fourth baby, on Eat, Sleep, Console program having significant difficulty with suck overnight (disorganized, thrusting) and sighing consistently but more notably when awake. Now taking donor milk feeds by bottle due to higher opioid doses needed for pain and multiple sedating meds for mom.     History:  Repeat  delivery with category II tracing @ 39w1d, needed CPAP for 15 minutes after birth and \"several passes of deep suction were performed resulting in copious amounts of bloody secretions.\" Katya delivered  AGA @ 8# 1.5 ounce birthweight, had 3% loss at 24 hours. Diaper output as expected for age, high intermediate risk serum bilirubin initially, recheck just under HI line into low intermediate risk zone.     Maternal history of mild & intermittent asthma, anemia, morbid obesity, PTSD, depression and anxiety managed with Clonidine (she says 0.5 mg though chart says 0.1 mg) BID PRN (Hale L3 for 0.1 to 0.3 mg BID, monitor for drowsiness, lethargy, pallor, dry mouth, poor feeding, constipation and weight gain; may reduce milk production by reducing prolactin secretion) Ruth says she takes Clonidine about twice a week, and Vistaril 25 mg TID PRN (L2 for  mg QID, monitor for sedation, dry mouth, constipation, urinary retention) which she has not been taking while inpatient, tobacco dependence (quit 2022 just before admission), and REINA managed with Methadone 130 mg daily (L2, monitor for EUGENIA after in utero exposure and sedation, slowed breathing rate/apnea, pallor, constipation and not waking to feed/poor feeding with breastmilk exposure). Ruth also has regular PRN prescriptions for melatonin 3 mg PRN nightly (L3, monitor for drowsiness and fatigue), antacid, Tylenol, narcan and Senna. In hospital she has been taking Flexeril 10 mg TID (L3 for 5-10 mg TID, monitor for drowsiness/sedation, dry mouth, poor feeding and vomiting) and low dose " Gabapentin 100 mg TID (L2 for 300-600 mg TID, monitor for sedation or irritability, not waking to feed/poor feeding, vomiting, tremor), and oxycodone 10-15 mg q 4 hours PRN-pain with 65 mg total yesterday in 24 hours (L3 for 5-10 mg q 6 hours PRN, monitor for sedation, slowed breathing, pallor, difficulty feeding, constipation and weight gain; multiple case reports and studies report problems for infants especially with doses higher than 30 mg/day).     Ruth shares she tried breastfeeding with her first but had very low milk volume. With second, she did some breastfeeding but had to supplement with formula for duration due to low supply. With her third child, she  for 6 months without difficulty and had better milk supply.     Feeding assessment:  No breast exam done, mom in bathroom most of visit but denies pain with pumping and no problems reported. Worked with Katya at 6 am feeding, he initially had hiccups and sighing with every breath (some of these grunty sounding) without flaring or retracting, he had no interest in latching at first so waited till hiccups resolved, patting his back where he burped about 3 times then attempt again. He then accepted empty nipple in mouth readily, though moved it around with his tongue and no latch first 20 seconds. With gentle jaw support he latched and ended up taking full 15 mL in about 10 minutes with no oral loss, though still quite disorganized suck first half of feeding swallowing air frequently and needed pacing for breathe breaks. Second half of feeding went quite well, he was still sucking actively with good seal when bottle emptied.    Education provided:  OB provider, Dr. Chavez in for exam during visit and discussing pain medications with Ruth who reports she's still struggling with pain. Together we all discussed first priority to have mom take what she needs for pain control so she can get up and down and care for herself and Katya. Also  "discussed upper limits of oxycodone/opioid pain medications that are safe for breastfeeding infants. Encouraged mom to take what she needs for pain and along with provider, encouraged pumping to establish and maintain supply as long as she needs to while taking higher opioid doses. Once she's no longer needing additional opioids (in addition to regular methadone), then make plan with his provider and see outpatient lactation as they return to direct breastfeeding and to begin using the milk she pumps. Discussed importance of stimulating milk supply especially in early days, encouraged pumping at least every time he eats with goal of 8 to 10 times per 24 hours, 10-15 minutes each time. Endorsed we don't expect much of anything out in early days but reassure the hormone stimulation of pumping effective and \"putting in order\" for milk supply and that the frequency of stimulation is most important right now. Encouraged skin to skin often when they are awake (for comfort to infant and hormone stimulation) and pumping 8-10 times/day. Discussed potential for SLP consult PRN if he has difficulty taking his bottle again, and availability of \"Bridge Bags\" (donor milk given free to families with  and financial need). Ruth very interested in this, left her application and handout on her bedside table while she was with baby in NICU.     Feeding Plan: Frequent skin to skin when awake, pacifier for comfort and NNS. Encouraged hands on pumping at least 8-10 times daily, pump and dump while taking oxycodone for sure until under 30 mg daily, after that per discussion with infant's provider until she's off of it completely (due to multiple sedating medications recommend collaborate with [facility] before resuming full breastmilk feeds. Their phone number is [phone], choose option 2 for health care personnel). Will request SLP consult (left sticky note for hospitalist) for support with his suck, positions and " bottle feeds.  Please recommend follow up with outpatient lactation consultant within a week of discharge due to history of low milk supply, follow for multiple medications in breastmilk and pump/dumping during hospital stay, and support with transitioning from bottle to breastfeeding when medications safe to do so.     Addendum: Provider asking about potentially using some of mom's milk mixed in with donor milk to help lessen withdrawal symptoms for baby. Call was placed to [facility] @ 1205 to inquire when it would be safe to start partial breast milk feeds given Methadone @ 130 mg daily (with in utero exposure) and addition of flexeril and oxycodone for pain management postpartum. Dr. Woods recommends: pump and dump until oxycodone dose is under 30 mg per day then could integrate 50% breastmilk with 50% donor milk or formula feeds. Once flexeril, gabapentin and hydroxyzine are no longer needed, then consider increasing amount of breastmilk intake as long as pediatrician is aware of medication doses for mom and can continue to monitor for side effects. Shared this returned voicemail with Dr. Norris at [facility], learned then of transfer to NICU.

## 2022-09-09 NOTE — CONSULTS
[facility]  MATERNAL CHILD HEALTH   INITIAL PSYCHOSOCIAL ASSESSMENT     DATA:     Presenting Information: Mom is a  who delivered an infant boy on 2022 at 39w1d gestation in the setting of c section. SW was consulted for chemical health history and resources. SW met with Ruth and her  at bedside.     Living Situation: Parents are Ruth and Boni, who live together in [place]) along with their 3 other children (12, 10, and 5).     Social Support: Ruth shares that her primary supporters are herself and  and partner. Ruth denies any other resources of social support.    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 Her  is working full time and he works in [employer].     Insurance: Ruth confirms that she is using [employer] for insurance.    Source of Financial Support: Parental employment, Ruth is accessing WIC and SNAP benefits. Ruth identified that housing (financial stress of rent) is a source of  stress for her and her family. She inquired about housing resources.     Mental Health History: Ruth denied any mental health concerns or history. Although chart review indicates depression and anxiety history.      History of Postpartum Mood Disorders: Ruth denied any postpartum mood disorders in previous deliveries.     Chemical Health History: Ruth did not want to discuss any chemical health history or use with social work. Chart review indicates a presumptive positive UDS for THC and methamphetamines on  2022, SW found in 2022 ED encounter. Chart review indicates that Ruth has OUD and managing with methadone. Chart review indicates  commitment to chemical health treatment in 2022. Most recent UDS is negative for all substances.      Legal/Child Protection Involvement: Ruth denies any child protection  involvement or legal issues.     INTERVENTION:       Chart review    Collaboration with team    Conducted Psychosocial Assessment    Introduction to Maternal Child Health SW role and scope of practice    Validated emotions and provided supportive listening    Provided psychoeducation on  mood and anxiety disorders    Provided resources and referrals    Zachery Herndon    ASSESSMENT:     Coping: At the time of the assessment, it was difficult for social work to fully assess coping. Ruth shared limited details and was appropriately sleepy during the assessment. SW offered to return at a later time but Ruth wanted to complete the assessment.     Assessment of parental risk for PMAD: Higher risk    Risk Factors: substance use hx, current child protection involvement, mental health history    Resiliency Factors & Strengths: committed mother and willing to ask for help    PLAN:   [facility] confirmed Child Protection involvement and confirmed worker assigned and following case. Ruth did not endorse any social work needs (outside of housing resources) and from a safety perspective child protection is already involved. Ruth retains the right to deny social work support while admitted. Unless a new safety issue arises, Ruth is the legal decision maker for herself and her baby. Ruth's baby remains in her physical custody unless there is a safety-related concern and child protection provides a court order that states otherwise.     SW will continue to follow for supportive intervention. SW will continue to follow baby's toxicology results.           FLEX Ca, DARBY, [facility]  Pediatric Float    Office: [phone]  Email: [email or web address]  After hours and weekend pager: [phone]  *NO LETTER*

## 2022-09-09 NOTE — PROGRESS NOTES
[facility]   Post-partum Progress Note    Name:  Ruth Goel  MRN: [id number]    S: Patient complains of significant pain this morning. Having trouble getting out of bed and ambulating. Reports pain meds help a little but quickly wear off. Voiding, not passing gas yet. Tolerating PO without N/V. Lochia is light. Baby doing ok, having some difficulties with sucking. She is pumping, baby also getting formula/donor milk.    O:   Patient Vitals for the past 24 hrs:   BP Temp Temp src Pulse Resp SpO2   22 0125 117/62 99  F (37.2  C) Oral 89 16 --   22 1706 108/65 99.3  F (37.4  C) Oral 81 18 --   22 1226 109/55 98.8  F (37.1  C) Oral 82 18 --   22 0836 125/77 98.9  F (37.2  C) Oral 66 18 99 %     Gen:  Resting comfortably, NAD  CV:  Warm and well perfused  Pulm:  Non-labored breathing on room air  Abd:  Soft, appropriately tender to palpation, non-distended. Fundus difficult to assess due to habitus, firm and non-tender.   Incision: Clean, dry, intact. No erythema, drainage or induration   Ext:  Non-tender, trace edema to bilateral lower extremities, SCDs in place    I/O last 3 completed shifts:  In: 1286.25 [P.O.:1030; I.V.:256.25]  Out: 1425 [Urine:1425]     Labs:  Hgb 11.6 > QBL 1115 (TXA) >  9.3    Assessment/Plan:  Ruth Goel 32 year old  on POD #2 s/p RHTCS. Procedure was notable for dense adhesions of anterior uterus to fascia requiring high transverse incision. Pregnancy otherwise notable for REINA on methadone and obesity. Doing well in early postoperative period.    # Postpartum management  Pain: Not adequately controlled with tylenol, flexeril, gabapentin, oxycodone 10-15 q4h. Toradol added yesterday as pt tolerates, however was PRN and she only received 1 dose. Made toradol scheduled for today, then can switch to PO naproxen tomorrow. Increased oxycodone to 10-15 q3h per pre-operative pain consult note.   GI:  PRN bowel regimen,  anti-emetics  : S/p hernandez, voiding  Hgb: Average blood loss during surgery, POD#1 hgb appropriate drop. Asymptomatic from ABLA. Will discharge with oral iron for hgb <10.   Rh: Positive  Rubella: Immune  Feed: Pumping. No issues   BC: Declines. S/p multiple discussions about recommended pregnancy spacing of 18 months and implication of high transverse incision.   PPx:  Encourage ambulation, IS, SCDs while confined to bed. Prophylactic lovenox 60mg BID     # RHTCS  - Due to high transverse incision on uterus, recommendation for future pregnancies include: repeat cesaeran sections be performed at 36 weeks, recommend VML skin incision    # REINA  - Continue PTA Methadone 130mg, receives in AM at 0800; verified by pharmacy who contacted patient's methadone clinic ([facility] [phone])  - SW consult placed  - Patient seen preoperatively by pain management team with recommendations for pain control as follows:   - Continue home methadone (concentrated liquid 10 mg/mL) - take 130 mg every morning.    Start oxycodone 5-10 mg PO q3hr PRN, if necessary could increase to oxycodone 10-15 mg PO q3hr PRN pain for a short period post op if 10 mg dose not effective and no issues with sedation or respiratory depression.  - Start hydromorphone IV 0.3-0.5 mg IV every 2 hr PRN breakthrough pain not controlled by PO medication or prior to significant activity (eg. PT/OT sessions). Would minimize use as much as able to avoid recidivism.     # Mild Asthma  - Asymptomatic, albuterol inhaler PRN     # Tobacco use  - Nicotine replacement available for patient PRN    # Hx Pre-eclampsia  - Monitoring BP closely, normotensive since delivery     Dispo: Anticipate discharge home on POD#3 pending pain control      Desirae Chavez MD  OB/GYN Resident PGY-3  09/09/22 6:45 AM     Physician Attestation   I personally examined and evaluated this patient.  I discussed the patient with the resident/fellow and care team, and agree with the  assessment and plan of care as documented in the note of 9/07/22.      I personally reviewed vital signs, medications, labs and exam.    Key findings:   Pain control improving today.   Has not showered yet - will try to get up to shower and remove bandage.     Hemoglobin   Date Value Ref Range Status   09/08/2022 9.3 (L) 11.7 - 15.7 g/dL Final   09/07/2022 11.2 (L) 11.7 - 15.7 g/dL Final   05/14/2018 11.0 (L) 11.7 - 15.7 g/dL Final     Acute blood loss anemia - plan iron on discharge.   Baby may need to go to NICU for withdrawal symptoms - NICU to consult. Question pending regarding whether maternal breast milk can be used in the setting of infant withdrawal symptoms.   Caron Nguyen MD  Date of Service (when I saw the patient): 09/09/22

## 2022-09-09 NOTE — PLAN OF CARE
Goal Outcome Evaluation:    Plan of Care Reviewed With: spouse, patient     Overall Patient Progress: improving     Vss. PP checks WNL. Pt reports pain control to be marginally satisfactory, increased pain with ambulation, able to rest overnight. No attempts to put baby to breast or pump. Hands on with consoling and caring for baby, eager and engaged in infant cares. Pt verbalizing worry about newborns condition, is her biggest concern overnight. Needs enc to ambulate. Voiding spontaneously without difficulty. Plan to continue cares.

## 2022-09-09 NOTE — PLAN OF CARE
Goal Outcome Evaluation:    Plan of Care Reviewed With: spouse, patient     Overall Patient Progress: improving       Data: VSS, postpartum assessments WNL. She is voiding without difficulty, up ad ileana, passing gas, eating and drinking normally. Incision is covered and unable to assess, lochia WNL, no s/s infection.  Pumping independently. Taking gabapentin, flexeril, tylenol, toradol and oxycodone for pain and using abdominal binder . Continuously reports higher pain levels.   Action: Education provided on plan of care  Response: Pt is agreeable with her plan of care. Positive attachment behaviors observed with infant. [place] went to the NICU around 12:30 today - she has visited his since transfer. Support person, , present. Anticipate discharge per care plan.

## 2022-09-10 LAB — PLATELET # BLD AUTO: 381 10E3/UL (ref 150–450)

## 2022-09-10 PROCEDURE — 36415 COLL VENOUS BLD VENIPUNCTURE: CPT | Performed by: STUDENT IN AN ORGANIZED HEALTH CARE EDUCATION/TRAINING PROGRAM

## 2022-09-10 PROCEDURE — 250N000013 HC RX MED GY IP 250 OP 250 PS 637: Performed by: OBSTETRICS & GYNECOLOGY

## 2022-09-10 PROCEDURE — 250N000011 HC RX IP 250 OP 636: Performed by: STUDENT IN AN ORGANIZED HEALTH CARE EDUCATION/TRAINING PROGRAM

## 2022-09-10 PROCEDURE — 250N000013 HC RX MED GY IP 250 OP 250 PS 637: Performed by: STUDENT IN AN ORGANIZED HEALTH CARE EDUCATION/TRAINING PROGRAM

## 2022-09-10 PROCEDURE — 85049 AUTOMATED PLATELET COUNT: CPT | Performed by: STUDENT IN AN ORGANIZED HEALTH CARE EDUCATION/TRAINING PROGRAM

## 2022-09-10 PROCEDURE — 120N000002 HC R&B MED SURG/OB UMMC

## 2022-09-10 RX ORDER — LIDOCAINE 4 G/G
1 PATCH TOPICAL
Status: DISCONTINUED | OUTPATIENT
Start: 2022-09-10 | End: 2022-09-11 | Stop reason: HOSPADM

## 2022-09-10 RX ADMIN — GABAPENTIN 100 MG: 100 CAPSULE ORAL at 09:22

## 2022-09-10 RX ADMIN — ACETAMINOPHEN 975 MG: 325 TABLET, FILM COATED ORAL at 02:46

## 2022-09-10 RX ADMIN — ACETAMINOPHEN 975 MG: 325 TABLET, FILM COATED ORAL at 22:38

## 2022-09-10 RX ADMIN — METHADONE HYDROCHLORIDE 130 MG: 10 CONCENTRATE ORAL at 09:21

## 2022-09-10 RX ADMIN — SENNOSIDES AND DOCUSATE SODIUM 2 TABLET: 8.6; 5 TABLET ORAL at 22:38

## 2022-09-10 RX ADMIN — LIDOCAINE PATCH 4% 1 PATCH: 40 PATCH TOPICAL at 15:44

## 2022-09-10 RX ADMIN — KETOROLAC TROMETHAMINE 15 MG: 15 INJECTION, SOLUTION INTRAMUSCULAR; INTRAVENOUS at 02:46

## 2022-09-10 RX ADMIN — ENOXAPARIN SODIUM 60 MG: 60 INJECTION SUBCUTANEOUS at 09:21

## 2022-09-10 RX ADMIN — SENNOSIDES AND DOCUSATE SODIUM 2 TABLET: 8.6; 5 TABLET ORAL at 09:21

## 2022-09-10 RX ADMIN — ACETAMINOPHEN 975 MG: 325 TABLET, FILM COATED ORAL at 09:21

## 2022-09-10 RX ADMIN — GABAPENTIN 100 MG: 100 CAPSULE ORAL at 15:44

## 2022-09-10 RX ADMIN — CYCLOBENZAPRINE HYDROCHLORIDE 10 MG: 5 TABLET, FILM COATED ORAL at 22:38

## 2022-09-10 RX ADMIN — GABAPENTIN 100 MG: 100 CAPSULE ORAL at 22:38

## 2022-09-10 RX ADMIN — ACETAMINOPHEN 975 MG: 325 TABLET, FILM COATED ORAL at 15:44

## 2022-09-10 RX ADMIN — ENOXAPARIN SODIUM 60 MG: 60 INJECTION SUBCUTANEOUS at 22:38

## 2022-09-10 RX ADMIN — CYCLOBENZAPRINE HYDROCHLORIDE 10 MG: 5 TABLET, FILM COATED ORAL at 10:08

## 2022-09-10 RX ADMIN — CYCLOBENZAPRINE HYDROCHLORIDE 10 MG: 5 TABLET, FILM COATED ORAL at 15:45

## 2022-09-10 ASSESSMENT — ACTIVITIES OF DAILY LIVING (ADL)
ADLS_ACUITY_SCORE: 23

## 2022-09-10 NOTE — PROGRESS NOTES
[facility]  MATERNAL CHILD HEALTH   SOCIAL WORK PROGRESS NOTE      DATA:     SW met with Ruth at bedside after nurse paged regarding Ruth having questions about where she can stay at hospital when she is discharged.     SW met with NICU attending and NICU charge nurse regarding mother's concerns and to explore options that may be available at [facility] when Ruth transfers.     INTERVENTION:     SW met with Ruth and validated her feelings of not wanting to be  from her baby for any period of time.     SW talked with NICU charge nurse and attending physician about Ruth's concerns. Charge nurse talked to [facility] and confirmed that they have recliners and that parents are able to sleep in them.     SW talked with Ruth's nurse to confirm anticipated discharge date. Attending reported she would talk with Ruth shortly about what to expect during the transfer.     ASSESSMENT:     Ruth and her  expressed frustration that they have received mixed messages about being able to board a the hospital. Ruth clearly stated she has no intention of leaving the hospital until her baby is discharged.       PLAN:     SW followed up with attending and charge nurse to determine what options are more Ruth to board with baby at [facility].     Page on-call SW if in need of additional supports.       Signature     FLEX Smith, LICSW  Weekend/On-call   Please contact the on-call pager for additional needs  [phone]

## 2022-09-10 NOTE — PLAN OF CARE
VSS on RA. Up ad ileana. Fundus firm at umbilicus. Lochia scant. Voids spontaneously. Passing flatus, denies nausea. Incisional pain managed with Tylenol and Toradol, no Oxycodone given this shift. Incision dressing c/d/i. Infant is in NICU. Pt did not breast pump. Continue plan of care.     Vitals:    09/09/22 0125 09/09/22 1100 09/09/22 1840 09/10/22 0243   BP: 117/62 117/56 103/52 105/42   BP Location:  Left arm  Left arm   Patient Position:  Semi-Bryant's  Semi-Bryant's   Cuff Size: Adult Regular Adult Large  Adult Large   Pulse: 89 83 85 84   Resp: 16 16 16 17   Temp: 99  F (37.2  C) 97.7  F (36.5  C) 98.5  F (36.9  C) 98.1  F (36.7  C)   TempSrc: Oral Oral Oral Oral   SpO2:       Weight:       Height:

## 2022-09-10 NOTE — PLAN OF CARE
Goal Outcome Evaluation:    Plan of Care Reviewed With: spouse, patient     Overall Patient Progress: improving     Data: VSS, postpartum assessments WNL. She is voiding without difficulty, up ad ileana, passing gas, eating and drinking normally. Incision appears to be healing well, lochia WNL, no s/s infection. Pumping independently, instructed to dump any milk until she is 24 hours w/out oxycodone per NICU lactation.  Taking tylenol, flexeril and gabapentin for pain and using abdominal binder.   Action: Education provided on plan of care  Response: Pt is agreeable with her plan of care. Goes to visit infant in the NICU regularly. Support person present. Anticipate discharge per care plan.

## 2022-09-10 NOTE — PLAN OF CARE
Vital signs within normal limits. Postpartum checks within normal limits. Patient eating and drinking normally. Patient able to empty bladder.  Adequate pain control.  Patient performing self cares and currently visiting infant at the NICU after sleeping most of the shift. Encouraged pt to ambulate. Will continue to assess/assist as needed.

## 2022-09-10 NOTE — LACTATION NOTE
"This note was copied from a baby's chart.  D:  I met with Ruth; Yadira is her 4th baby.  See note from lactation colleague dated 9-9 for lactation H&P.  She has not taken oxycodone since 8pm yesterday but stated she is in significant pain.  Current meds include flexeril (10mg three times daily), gabapentin (100mg three times daily) and methadone (130mg daily).  She has already started to pump.   I:  We discussed and I gave her a copy of the lactation plan (see below and prior note) written up by our attending and nurse manager and myself to improve clarity and  communication; she read and acknowledged the plan and had no questions.  We talked about importance of maternal self care and taking what she needed for comfort.  Mom stated she was experienced with pumping and didn't have questions/ concerns, but was amendable to reviewing folder briefly.  I gave her a folder of introductory materials and went over pumping guidelines.  I reviewed physiology of colostrum and milk production, pumping guidelines, and I gave her a log and encouraged her to use it.   I explained how to access the videos \"Hand Expression\" and \"Maximizing Milk Production\"; as well as other helpful books and websites.   We discussed hands-on pumping techniques and usefulness of a hands-free pumping bra; I gave her a belly band to make one (declined making one with me).    A:  Mom has information she needs to initiate her supply.   P:  Will continue to provide lactation support.    Rakel Santo, RNC, IBCLC        LACTATION PLAN, 9-10-22     1.  No mom's milk over the weekend.  Please toss pumped milk until you are off oxycodone for 24 hours.     2.  On Monday, we will call [facility] and determine a safe plan for your milk.     3.  Continue to use donor milk.                  "

## 2022-09-10 NOTE — PROGRESS NOTES
[facility]   Post-partum Progress Note    Name:  Ruth Goel  MRN: [id number]    S: Patient doing well, sleeping soundly on arrival and not having too much pain. Has been able to be up and moving around the room more yesterday. No lightheadedness or dizziness. Voiding, passing gas, and had a BM yesterday. Tolerating PO without N/V. Lochia is light. Baby doing well. She is pumping, baby also getting formula/donor milk.    O:   Patient Vitals for the past 24 hrs:   BP Temp Temp src Pulse Resp   09/10/22 0243 105/42 98.1  F (36.7  C) Oral 84 17   22 1840 103/52 98.5  F (36.9  C) Oral 85 16   22 1100 117/56 97.7  F (36.5  C) Oral 83 16     Gen:  Resting comfortably, NAD  CV:  Warm and well perfused  Pulm:  Non-labored breathing on room air  Abd:  Soft, appropriately tender to palpation, non-distended. Fundus difficult to assess due to habitus, firm and non-tender.   Incision: Clean, dry, intact. No erythema, drainage or induration   Ext:  Non-tender, trace edema to bilateral lower extremities, SCDs in place     Labs:  Hgb 11.6 > QBL 1115 (TXA) >  9.3    Assessment/Plan:  Ruth Goel 32 year old  on POD#3 s/p RHTCS. Procedure was notable for dense adhesions of anterior uterus to fascia requiring high transverse incision. Pregnancy otherwise notable for REINA on methadone and obesity. Doing well in early postoperative period.    # Postpartum management  Pain: Well controlled today with tylenol, naproxen, flexeril, gabapentin, oxycodone 10-15 q3h.   GI:  PRN bowel regimen, anti-emetics  : S/p hernandez, voiding  Hgb: Average blood loss during surgery, POD#1 hgb appropriate drop. Asymptomatic from acute blood loss anemia. Will discharge with oral iron for hgb <10.   Rh: Positive  Rubella: Immune  Feed: Pumping. No issues   BC: Declines. S/p multiple discussions about recommended pregnancy spacing of 18 months and implication of high transverse incision.   PPx:  Encourage  ambulation, IS, SCDs while confined to bed. Prophylactic lovenox 60mg BID     # RHTCS  - Due to high transverse incision on uterus, recommendation for future pregnancies include: repeat cesaeran sections be performed at 36 weeks, recommend VML skin incision    # REINA  - Continue PTA Methadone 130mg, receives in AM at 0800; verified by pharmacy who contacted patient's methadone clinic ([facility] [phone])  - SW consult placed  - Patient seen preoperatively by pain management team with recommendations for pain control as follows:   - Continue home methadone (concentrated liquid 10 mg/mL) - take 130 mg every morning.    Start oxycodone 5-10 mg PO q3hr PRN, if necessary could increase to oxycodone 10-15 mg PO q3hr PRN pain for a short period post op if 10 mg dose not effective and no issues with sedation or respiratory depression.  - Start hydromorphone IV 0.3-0.5 mg IV every 2 hr PRN breakthrough pain not controlled by PO medication or prior to significant activity (eg. PT/OT sessions). Would minimize use as much as able to avoid recidivism.     # Mild Asthma  - Asymptomatic, albuterol inhaler PRN     # Tobacco use  - Nicotine replacement available for patient PRN    # Hx Pre-eclampsia  - Monitoring BP closely, normotensive since delivery     Dispo: Anticipate discharge home today, POD#3, meeting goals.       Caron Pelayo MD  OB/GYN Resident PGY-3  8:34 AM September 10, 2022      Patient seen and examined by me, agree with above resident note. Overall doing well and meeting goals. Her methadone clinic is not open sundays, will keep and give dose tomorrow prior to discharge.  In addition, we discussed attempt to taper down oxy dose she is taking.  Will get lidocaine patch as she often mostly feels burning of the incision.  Ambulation encouraged.  Cont routine cares     NAYLA DIAL MD

## 2022-09-10 NOTE — PROVIDER NOTIFICATION
09/10/22 1000   Provider Notification   Provider Name/Title Social work   Method of Notification Electronic Page   Request Evaluate-Remote   Notification Reason Status Update   SW met w/[id number] on 9/9. after the meeting NB transferred to NICU, so SW should see them again

## 2022-09-11 VITALS
SYSTOLIC BLOOD PRESSURE: 113 MMHG | TEMPERATURE: 98.7 F | BODY MASS INDEX: 50.79 KG/M2 | WEIGHT: 276 LBS | HEART RATE: 82 BPM | DIASTOLIC BLOOD PRESSURE: 62 MMHG | RESPIRATION RATE: 18 BRPM | OXYGEN SATURATION: 97 % | HEIGHT: 62 IN

## 2022-09-11 PROCEDURE — 250N000013 HC RX MED GY IP 250 OP 250 PS 637: Performed by: STUDENT IN AN ORGANIZED HEALTH CARE EDUCATION/TRAINING PROGRAM

## 2022-09-11 PROCEDURE — 250N000011 HC RX IP 250 OP 636: Performed by: STUDENT IN AN ORGANIZED HEALTH CARE EDUCATION/TRAINING PROGRAM

## 2022-09-11 RX ORDER — AMOXICILLIN 250 MG
1 CAPSULE ORAL DAILY
Qty: 100 TABLET | Refills: 0 | Status: SHIPPED | OUTPATIENT
Start: 2022-09-11

## 2022-09-11 RX ORDER — OXYCODONE HYDROCHLORIDE 5 MG/1
5-10 TABLET ORAL EVERY 6 HOURS PRN
Qty: 24 TABLET | Refills: 0 | Status: SHIPPED | OUTPATIENT
Start: 2022-09-11 | End: 2022-09-14

## 2022-09-11 RX ORDER — GABAPENTIN 100 MG/1
100 CAPSULE ORAL 3 TIMES DAILY
Qty: 45 CAPSULE | Refills: 0 | Status: SHIPPED | OUTPATIENT
Start: 2022-09-11 | End: 2022-09-26

## 2022-09-11 RX ORDER — LIDOCAINE 4 G/G
2 PATCH TOPICAL EVERY 24 HOURS
Qty: 14 PATCH | Refills: 1 | Status: SHIPPED | OUTPATIENT
Start: 2022-09-11

## 2022-09-11 RX ORDER — ACETAMINOPHEN 325 MG/1
650 TABLET ORAL EVERY 6 HOURS PRN
Qty: 100 TABLET | Refills: 0 | Status: SHIPPED | OUTPATIENT
Start: 2022-09-11

## 2022-09-11 RX ORDER — CYCLOBENZAPRINE HCL 10 MG
10 TABLET ORAL 3 TIMES DAILY
Qty: 45 TABLET | Refills: 0 | Status: SHIPPED | OUTPATIENT
Start: 2022-09-11 | End: 2022-09-26

## 2022-09-11 RX ADMIN — ENOXAPARIN SODIUM 60 MG: 60 INJECTION SUBCUTANEOUS at 13:53

## 2022-09-11 RX ADMIN — GABAPENTIN 100 MG: 100 CAPSULE ORAL at 09:29

## 2022-09-11 RX ADMIN — ACETAMINOPHEN 975 MG: 325 TABLET, FILM COATED ORAL at 13:52

## 2022-09-11 RX ADMIN — OXYCODONE HYDROCHLORIDE 10 MG: 5 TABLET ORAL at 14:10

## 2022-09-11 RX ADMIN — GABAPENTIN 100 MG: 100 CAPSULE ORAL at 13:53

## 2022-09-11 RX ADMIN — ACETAMINOPHEN 975 MG: 325 TABLET, FILM COATED ORAL at 05:02

## 2022-09-11 RX ADMIN — CYCLOBENZAPRINE HYDROCHLORIDE 10 MG: 5 TABLET, FILM COATED ORAL at 09:29

## 2022-09-11 RX ADMIN — CYCLOBENZAPRINE HYDROCHLORIDE 10 MG: 5 TABLET, FILM COATED ORAL at 13:53

## 2022-09-11 RX ADMIN — SENNOSIDES AND DOCUSATE SODIUM 2 TABLET: 8.6; 5 TABLET ORAL at 09:29

## 2022-09-11 RX ADMIN — METHADONE HYDROCHLORIDE 130 MG: 10 CONCENTRATE ORAL at 09:30

## 2022-09-11 ASSESSMENT — ACTIVITIES OF DAILY LIVING (ADL)
ADLS_ACUITY_SCORE: 23
ADLS_ACUITY_SCORE: 22
ADLS_ACUITY_SCORE: 23

## 2022-09-11 NOTE — PROGRESS NOTES
[facility]   Post-partum Progress Note    Name:  Ruth Goel  MRN: [id number]    S: Patient doing ok, main complaint is pain control. Stopped oxyocodone abruptly as she was told she could not use breastmilk if taking any oxycodone at all.  Ambulating without difficulty. No lightheadedness or dizziness. Voiding, passing gas, and had a BM yesterday. Tolerating PO without N/V. Lochia is light. Baby doing well. She is pumping, baby also getting formula/donor milk.    O:   Patient Vitals for the past 24 hrs:   BP Temp Temp src Pulse Resp   09/10/22 2300 122/64 98.6  F (37  C) Oral -- 17   09/10/22 1800 128/56 98.8  F (37.1  C) Oral 82 16   09/10/22 1012 124/80 99.2  F (37.3  C) Oral 87 16   09/10/22 0243 105/42 98.1  F (36.7  C) Oral 84 17     Gen:  Resting comfortably, NAD  CV:  Warm and well perfused  Pulm:  Non-labored breathing on room air  Abd:  Soft, appropriately tender to palpation, non-distended. Fundus difficult to assess due to habitus, firm and non-tender.   Incision: Clean, dry, intact. No erythema, drainage or induration   Ext:  Non-tender, trace edema to bilateral lower extremities, SCDs in place     Labs:  Hgb 11.6 > QBL 1115 (TXA) >  9.3    Assessment/Plan:  Ruth Goel 32 year old  on POD#4 s/p RHTCS. Procedure was notable for dense adhesions of anterior uterus to fascia requiring high transverse incision. Pregnancy otherwise notable for REINA on methadone and obesity. Doing well in early postoperative period.    # Postpartum management  Pain: Well controlled today with tylenol, naproxen, flexeril, gabapentin, oxycodone 10-15 q3h.   GI:  PRN bowel regimen, anti-emetics  : S/p hernandez, voiding spontaneously  Hgb: Hgb 11.6> QBL 1115 (TXA)> 9.3. Asymptomatic from acute blood loss anemia. Will discharge with oral iron for hgb <10.   Rh: Positive  Rubella: Immune  Feed: Pumping. No issues   BC: Declines. S/p multiple discussions about recommended pregnancy spacing of 18  months and implication of high transverse incision.   PPx:  Encourage ambulation, IS, SCDs while confined to bed. Prophylactic lovenox 60mg BID     # RHTCS  - Due to high transverse incision on uterus, recommendation for future pregnancies include: repeat cesaeran sections be performed at 36 weeks, recommend VML skin incision    # REINA  - Continue PTA Methadone 130mg, receives in AM at 0800; verified by pharmacy who contacted patient's methadone clinic ([facility] [phone])  - SW consult placed  - Patient seen preoperatively by pain management team with recommendations for pain control as follows:   - Continue home methadone (concentrated liquid 10 mg/mL) - take 130 mg every morning.    Start oxycodone 5-10 mg PO q3hr PRN, if necessary could increase to oxycodone 10-15 mg PO q3hr PRN pain for a short period post op if 10 mg dose not effective and no issues with sedation or respiratory depression.  - Start hydromorphone IV 0.3-0.5 mg IV every 2 hr PRN breakthrough pain not controlled by PO medication or prior to significant activity (eg. PT/OT sessions). Would minimize use as much as able to avoid recidivism.     Currently has been off oxycodone for 36 hours with poor pain control. Spoke with NICU attending who clarified that she could pump and label breast milk with when she took last meds and this may be able to be used in the future in dilution with other breast milk. Will restart oxycodone at lower dose with goal of tapering off over next few days. Will discharge with 3 day supply of 5-10 mg every 6 hours. May need virtual appointments to refill. Baby is being transferred to [facility] today and parents will attend him.     # Mild Asthma  - Asymptomatic, albuterol inhaler PRN     # Tobacco use  - Nicotine replacement available for patient PRN    # Hx Pre-eclampsia  - Monitoring BP closely, normotensive since delivery     Dispo: Anticipate discharge home today, POD#4, meeting goals.       Caron King  MD Wendy

## 2022-09-11 NOTE — PLAN OF CARE
Goal Outcome Evaluation:    Plan of Care Reviewed With: patient     Overall Patient Progress: improving    Outcome Evaluation: VSS, postpartum assessment WDL, voiding and stooling without difficulty, tolerating PO drink and meals.  Pain better managed when taking oxycodone in conjunction with scheduled tylenol.  , Boni, supportive at bedside.  Patient denies questions or concerns, discharge to home today.

## 2022-09-26 ENCOUNTER — LACTATION ENCOUNTER (OUTPATIENT)
Age: 32
End: 2022-09-26

## 2022-09-26 NOTE — LACTATION NOTE
This note was copied from a baby's chart.  Lactation called to see family. Mother states she is now allowed to give baby her milk and was wanting information to increase milk supply. Discussed need for breast stimulation every 2-3 hours or 8 to 12 times a day. Offered assistance with getting infant latched when ready, then pumping and hand expressing. Explained to mother supply and demand. Explained power pumping to mother and reasons why it is beneficial. Patient expressed that she really wants to give her milk to baby. Encouraged mother to call for help with latch.

## 2022-10-05 NOTE — PROGRESS NOTES
"[facility]   Post-partum Progress Note    Name:  Ruth Goel  MRN: [id number]    S: Patient doing well this morning. Pain is tolerable with current pain regimen. Newberry in place, not yet ambulating. Tolerating regular diet without nausea or vomiting. No dizziness at rest. Lochia is within expected limits. Not yet passing flatus or having BM. Reports that she has tolerated Aleve in the past without issue, would be okay with trying this morning.     O:   Patient Vitals for the past 24 hrs:   BP Temp Temp src Pulse Resp SpO2 Height Weight   09/08/22 0415 113/72 98.4  F (36.9  C) Oral 74 20 99 % -- --   09/08/22 0006 114/70 98.8  F (37.1  C) Oral 63 20 100 % -- --   09/07/22 2308 113/70 98.4  F (36.9  C) Oral 68 16 99 % -- --   09/07/22 2211 107/59 98.6  F (37  C) Oral 70 16 100 % -- --   09/07/22 2059 133/71 97.6  F (36.4  C) Oral 74 16 100 % -- --   09/07/22 2024 119/69 97.9  F (36.6  C) Oral 52 -- 100 % -- --   09/07/22 1950 98/87 -- -- 58 13 100 % -- --   09/07/22 1945 104/88 -- -- 64 20 98 % -- --   09/07/22 1940 (!) 104/94 98.2  F (36.8  C) Axillary 63 16 100 % -- --   09/07/22 1935 (!) 96/90 -- -- 53 14 100 % -- --   09/07/22 1930 119/81 -- -- 58 14 100 % -- --   09/07/22 1925 100/69 -- -- (!) 49 20 100 % -- --   09/07/22 1920 102/53 -- -- (!) 46 12 100 % -- --   09/07/22 1915 107/62 -- -- (!) 47 17 100 % -- --   09/07/22 1910 103/60 -- -- (!) 48 (!) 51 95 % -- --   09/07/22 1900 104/61 -- -- 50 17 100 % -- --   09/07/22 1845 112/89 -- -- 60 17 98 % -- --   09/07/22 1830 -- -- -- 61 18 98 % -- --   09/07/22 1815 107/70 98.1  F (36.7  C) Temporal 54 18 98 % -- --   09/07/22 1403 -- -- -- -- -- -- 1.575 m (5' 2\") 125.2 kg (276 lb)   09/07/22 1225 122/62 98.4  F (36.9  C) Oral 67 18 -- -- --     Gen:  Resting comfortably, NAD  CV:  Warm and well perfused  Pulm:  Non-labored breathing on room air  Abd:  Soft, appropriately tender to palpation, non-distended. Fundus below umbilicus though " difficult to assess due to habitus, firm and non-tender.   Incision: Clean, dry, intact. No erythema, drainage or induration   Ext:  Non-tender, trace edema to bilateral lower extremities, SCDs in place    I/O last 3 completed shifts:  In: 3875.93 [I.V.:3875.93]  Out: 1454 [Urine:350; Blood:1104]    Assessment/Plan:  Ruth Goel 32 year old  on POD #1 s/p RHTCS. Procedure was notable for dense adhesions of anterior uterus to fascia requiring high transverse incision. Pregnancy otherwise notable for REINA on methadone and obesity. Doing well in early postoperative period, hernandez still in place and not yet ambulating.     # Postpartum management  Pain: Adequately controlled with tylenol, flexeril, gabapentin, oxycodone 10-15 q4h. Will add Toradol and naproxen as patient reports she has taken this without a reaction in the past   GI:  PRN bowel regimen, anti-emetics  : Hernandez in place. Plan to remove this morning  Hgb: 11.6 > EBL 1115 (TXA) > AM Hemoglobin pending. Asymptomatic from blood loss anemia; will discharge with oral iron if <10.   Rh: Positive  Rubella: Immune  Feed: Pumping. No issues   BC: Not reviewed today, will discussed recommended pregnancy spacing of 18 months and implication of high transverse incision.   PPx:  Encourage ambulation, IS, SCDs while confined to bed. Prophylactic lovenox 60mg BID     # RHTCS  - Due to high transverse incision on uterus, recommendation for future pregnancies include: repeat cesaeran sections be performed at 36 weeks via possible VML.   - Will need to discuss with patient implication in future pregnancy    # REINA  - Continue PTA Methadone 130mg, uses in the AM at 0800. Ordered for this morning and spoke with pharmacy last night who contact patients methadone clinic ([facility] [phone]) and verified dosing.   - SW consult placed  - Patient seen preoperatively by pain management team with recommendations for pain control as follows:   - Continue home  methadone (concentrated liquid 10 mg/mL) - take 130 mg every morning.    Start oxycodone 5-10 mg PO q3hr PRN, if necessary could increase to oxycodone 10-15 mg PO q3hr PRN pain for a short period post op if 10 mg dose not effective and no issues with sedation or respiratory depression.  - Start hydromorphone IV 0.3-0.5 mg IV every 2 hr PRN breakthrough pain not controlled by PO medication or prior to significant activity (eg. PT/OT sessions). Would minimize use as much as able to avoid recidivism.     # Mild Asthma  - Asymptomatic, albuterol inhaler PRN     # Tobacco use  - Nicotine replacement available for patient PRN    # Hx Pre-eclampsia  - Monitoring BP closely, normotensive since delivery     Dispo: Anticipate discharge home on POD#2-3      Caron Pelayo MD  OB/GYN Resident PGY-3  6:59 AM September 8, 2022          Physician Attestation   I, Mari Lee, personally saw and examined Ruth.  I have reviewed her vitals, labs, and medications. I have discussed the plan of care with the resident.   She is doing well.   Hemoglobin   Date Value Ref Range Status   09/08/2022 9.3 (L) 11.7 - 15.7 g/dL Final   09/07/2022 11.2 (L) 11.7 - 15.7 g/dL Final   05/14/2018 11.0 (L) 11.7 - 15.7 g/dL Final      discussed routine post op/ pp cares and normal expectations for recovery.  Plan discharge home POD #2 or 3       Mari Lee MD  Date of Service (when I saw the patient): September 8, 2022      No

## 2022-11-21 ENCOUNTER — HEALTH MAINTENANCE LETTER (OUTPATIENT)
Age: 32
End: 2022-11-21

## 2023-04-16 ENCOUNTER — HEALTH MAINTENANCE LETTER (OUTPATIENT)
Age: 33
End: 2023-04-16

## 2024-06-22 ENCOUNTER — HEALTH MAINTENANCE LETTER (OUTPATIENT)
Age: 34
End: 2024-06-22

## 2024-11-11 ENCOUNTER — HOSPITAL ENCOUNTER (EMERGENCY)
Facility: CLINIC | Age: 34
Discharge: HOME | End: 2024-11-11
Attending: PHYSICIAN ASSISTANT | Admitting: PHYSICIAN ASSISTANT
Payer: COMMERCIAL

## 2024-11-11 VITALS
SYSTOLIC BLOOD PRESSURE: 154 MMHG | WEIGHT: 293 LBS | HEIGHT: 62 IN | BODY MASS INDEX: 53.92 KG/M2 | HEART RATE: 92 BPM | OXYGEN SATURATION: 100 % | RESPIRATION RATE: 18 BRPM | TEMPERATURE: 97 F | DIASTOLIC BLOOD PRESSURE: 93 MMHG

## 2024-11-11 DIAGNOSIS — S02.5XXA CLOSED FRACTURE OF TOOTH, INITIAL ENCOUNTER: ICD-10-CM

## 2024-11-11 DIAGNOSIS — R68.84 JAW PAIN: ICD-10-CM

## 2024-11-11 PROCEDURE — 250N000011 HC RX IP 250 OP 636: Performed by: PHYSICIAN ASSISTANT

## 2024-11-11 PROCEDURE — 96372 THER/PROPH/DIAG INJ SC/IM: CPT | Performed by: PHYSICIAN ASSISTANT

## 2024-11-11 PROCEDURE — 99284 EMERGENCY DEPT VISIT MOD MDM: CPT

## 2024-11-11 RX ORDER — KETOROLAC TROMETHAMINE 15 MG/ML
15 INJECTION, SOLUTION INTRAMUSCULAR; INTRAVENOUS ONCE
Status: COMPLETED | OUTPATIENT
Start: 2024-11-11 | End: 2024-11-11

## 2024-11-11 RX ADMIN — KETOROLAC TROMETHAMINE 15 MG: 15 INJECTION, SOLUTION INTRAMUSCULAR; INTRAVENOUS at 12:15

## 2024-11-11 ASSESSMENT — COLUMBIA-SUICIDE SEVERITY RATING SCALE - C-SSRS
1. IN THE PAST MONTH, HAVE YOU WISHED YOU WERE DEAD OR WISHED YOU COULD GO TO SLEEP AND NOT WAKE UP?: NO
2. HAVE YOU ACTUALLY HAD ANY THOUGHTS OF KILLING YOURSELF IN THE PAST MONTH?: NO
6. HAVE YOU EVER DONE ANYTHING, STARTED TO DO ANYTHING, OR PREPARED TO DO ANYTHING TO END YOUR LIFE?: NO

## 2024-11-11 ASSESSMENT — ACTIVITIES OF DAILY LIVING (ADL): ADLS_ACUITY_SCORE: 0

## 2024-11-11 NOTE — ED PROVIDER NOTES
Emergency Department Note      History of Present Illness     Chief Complaint   Dental Pain      HPI   Ruth Goel is a 34 year old female with history of opioid dependence, Bell's palsy who presents with jaw pain. She reports she saw the dentist approximately three days ago on 11/8/2024 and was prescribed antibiotics for a dental infection of her left bottom molar. She has been taking the antibiotics however pain has not improved. Patient instructed to go to ED for any worsening pain and was seen at outside ED. She reports she is taking tylenol #3 for pain but this is not helping. Denies fevers, chills but notes increase in facial swelling and feels like the infection is spreading inward. She also chipped a tooth on her left upper jaw and has a scheduled dentist appointment today.     Independent Historian   None    Review of External Notes   11/9/2024 ED [facility] seen for tooth ache and was started on Clindamycin. Patient reports she is still taking augmentin.    Past Medical History     Medical History and Problem List   Past Medical History:   Diagnosis Date    Anxiety     Chickenpox     Depression     History of pre-eclampsia in prior pregnancy, currently pregnant     Mild intermittent asthma without complication     morbid obesity     PTSD (post-traumatic stress disorder)     Substance abuse in remission (H)     Tobacco abuse     Uncomplicated asthma     Varicella        Medications   acetaminophen (TYLENOL) 325 MG tablet  albuterol (PROAIR HFA/PROVENTIL HFA/VENTOLIN HFA) 108 (90 Base) MCG/ACT inhaler  albuterol (PROAIR HFA;PROVENTIL HFA;VENTOLIN HFA) 90 mcg/actuation inhaler  calcium carbonate (TUMS) 500 MG chewable tablet  calcium carbonate 500 mg, elemental, 1250 (500 Ca) MG tablet chewable  cholecalciferol 25 MCG (1000 UT) TABS  cloNIDine (CATAPRES) 0.1 MG tablet  FEROSUL 325 (65 Fe) MG tablet  ferrous sulfate (FEROSUL) 325 (65 Fe) MG tablet  gabapentin (NEURONTIN) 100 MG  "capsule  hydrOXYzine (VISTARIL) 25 MG capsule  hydrOXYzine (VISTARIL) 25 MG capsule  Lidocaine (LIDOCARE) 4 % Patch  melatonin 3 MG CAPS  methadone (DOLOPHINE-INTENSOL) 10 MG/ML (HIGH CONC) solution  naloxone (NARCAN) 4 MG/0.1ML nasal spray  Prenatal Vit-Fe Fumarate-FA (PRENATAL VITAMIN PLUS LOW IRON) 27-1 MG TABS  Prenatal Vit-Fe Fumarate-FA (PRENATAL VITAMINS) 28-0.8 MG TABS  Prenatal Vit-Fe Fumarate-FA (TRINATAL RX 1) 60-1 MG TABS  senna (SENOKOT) 8.6 MG tablet  senna-docusate (SENOKOT-S/PERICOLACE) 8.6-50 MG tablet  SENNA-TIME 8.6 MG tablet  SM ANTACID 500 MG chewable tablet  vitamin C (ASCORBIC ACID) 500 MG tablet        Surgical History   Past Surgical History:   Procedure Laterality Date    AS REMOVE TONSILS/ADENOIDS,<13 Y/O       SECTION       SECTION  2010     SECTION  10/03/2012     SECTION      3 Csections     SECTION N/A 2022    Procedure:  SECTION;  Surgeon: Mari Lee MD;  Location: [facility] L+D    CHOLECYSTECTOMY      LAPAROSCOPIC CHOLECYSTECTOMY      LAPAROSCOPIC CHOLECYSTECTOMY  2016    TONSILLECTOMY      TONSILLECTOMY & ADENOIDECTOMY Bilateral        Physical Exam     Patient Vitals for the past 24 hrs:   BP Temp Temp src Pulse Resp SpO2 Height Weight   24 1031 (!) 154/93 97  F (36.1  C) Temporal 92 18 100 % 1.575 m (5' 2\") 133.6 kg (294 lb 8.6 oz)     Physical Exam  Constitutional: Alert, attentive, GCS 15  HENT:    Nose: Nose normal.    Mouth/Throat: Mucous membranes are moist. Erythema at gingival base of tooth 20 without fluctuance or purulence. Multiple missing tooth noted in left lower jaw. Chipped tooth #11 of left upper jaw without erythema or fluctuance.   Eyes: EOM are normal.   CV: regular rate and rhythm; no murmurs, rubs or gallups  Chest: Effort normal and breath sounds normal.   GI:  There is no tenderness. No distension. Normal bowel sounds  MSK: Normal range of motion.   Neurological: Alert, attentive  Skin: Skin " is warm and dry.      Diagnostics     Lab Results   Labs Ordered and Resulted from Time of ED Arrival to Time of ED Departure - No data to display    Imaging   No orders to display       EKG   None    Independent Interpretation   None    ED Course      Medications Administered   Medications   ketorolac (TORADOL) injection 15 mg (15 mg Intramuscular $Given 11/11/24 4076)       Procedures   Procedures     Discussion of Management   None    ED Course        Additional Documentation  None    Medical Decision Making / Diagnosis     CMS Diagnoses: None    MIPS       None    MDM   Ruth Goel is a 34 year old female who presents with jaw pain and dental concerns.  She is afebrile, hypertensive at 154/93, not hypoxic.  Physical examination reveals gingival erythema at the base of a left bottom tooth (#20) but no dental abscess. No Rebel's angina or parotitis on exam.  She is already on Augmentin though I reviewed her most recent ED visit on 11/9/24 that showed she was started on clindamycin.  Patient reports that she is still taking the Augmentin and actually has a dental appointment today for a chipped tooth on her upper left jaw which is identified on exam. Offered dental block however this was declined by patient.  She instead requests Toradol which she has tolerated this in the past.  Recommend she continue with her oral antibiotics and then follow-up with the scheduled dental appointment today..  She may continue with her home medications including Tylenol 3 as needed for pain.  Patient is comfortable with plan.  Return precautions to ED discussed including fevers, vomiting, or spreading worsening pain or swelling.    Disposition   The patient was discharged.     Diagnosis     ICD-10-CM    1. Jaw pain  R68.84       2. Closed fracture of tooth, initial encounter  S02.5XXA            Discharge Medications   Discharge Medication List as of 11/11/2024 12:17 PM            11:57 AM  November 11, 2024  CONNIE MARTINS,  Desirae Gonzalez PA-C  11/11/24 1734

## 2024-11-11 NOTE — ED TRIAGE NOTES
Pt presents with L lower dental pain and tooth abscess. Pt was seen by a dentist and given abx and pain medication. Reports taking abx as prescribed. Reports pain is worsening and feels that area is more swollen. Took ibuprofen this morning. Reports being seen at [facility] 1 yx for worsening sx and was unhappy with care received. A & Ox4.      Triage Assessment (Adult)       Row Name 11/11/24 1028          Triage Assessment    Airway WDL WDL        Respiratory WDL    Respiratory WDL WDL        Cognitive/Neuro/Behavioral WDL    Cognitive/Neuro/Behavioral WDL WDL

## 2025-07-12 ENCOUNTER — HEALTH MAINTENANCE LETTER (OUTPATIENT)
Age: 35
End: 2025-07-12

## 2025-08-30 ENCOUNTER — TELEPHONE (OUTPATIENT)
Dept: NURSING | Facility: CLINIC | Age: 35
End: 2025-08-30
Payer: COMMERCIAL

## (undated) DEVICE — SU VICRYL 3-0 SH 27" J316H

## (undated) DEVICE — PREP CHLORAPREP 26ML TINTED ORANGE  260815

## (undated) DEVICE — SU MONOCRYL 4-0 PS-2 18" UND Y496G

## (undated) DEVICE — SOL WATER IRRIG 1000ML BOTTLE 07139-09

## (undated) DEVICE — BNDG ABDOMINAL BINDER 10X26-50" 08140145

## (undated) DEVICE — STOCKING SLEEVE COMPRESSION CALF LG

## (undated) DEVICE — PACK C-SECTION LF PL15OTA83B

## (undated) DEVICE — GLOVE ESTEEM POWDER FREE SMT 7.0  2D72PT70

## (undated) DEVICE — BARRIER INTERCEED 5X6" 4350XL

## (undated) DEVICE — ESU GROUND PAD UNIVERSAL W/O CORD

## (undated) DEVICE — SU VICRYL 0 CT-1 36" J346H

## (undated) DEVICE — CATH TRAY FOLEY 16FR BARDEX W/DRAIN BAG STATLOCK 300316A

## (undated) DEVICE — SOL NACL 0.9% IRRIG 1000ML BOTTLE 07138-09

## (undated) DEVICE — STRAP KNEE/BODY 31143004

## (undated) DEVICE — RETR PANNICULUS 16X24 7/8" 25-G5 PH -25

## (undated) DEVICE — DRSG ADAPTIC 3X8" 6113

## (undated) DEVICE — GLOVE SENSICARE PI POWDER FREE 7.5 LATEX FREE MSG9075

## (undated) DEVICE — SU MONOCRYL 0 CT-1 36" Y346H

## (undated) DEVICE — SU PDS II 0 CTX 60" Z990G

## (undated) RX ORDER — PROPOFOL 10 MG/ML
INJECTION, EMULSION INTRAVENOUS
Status: DISPENSED
Start: 2022-09-07

## (undated) RX ORDER — KETOROLAC TROMETHAMINE 30 MG/ML
INJECTION, SOLUTION INTRAMUSCULAR; INTRAVENOUS
Status: DISPENSED
Start: 2022-09-07

## (undated) RX ORDER — FENTANYL CITRATE 50 UG/ML
INJECTION, SOLUTION INTRAMUSCULAR; INTRAVENOUS
Status: DISPENSED
Start: 2022-09-07